# Patient Record
Sex: MALE | Race: WHITE | URBAN - METROPOLITAN AREA
[De-identification: names, ages, dates, MRNs, and addresses within clinical notes are randomized per-mention and may not be internally consistent; named-entity substitution may affect disease eponyms.]

---

## 2017-01-30 ENCOUNTER — OFFICE VISIT (OUTPATIENT)
Dept: INTERNAL MEDICINE | Facility: CLINIC | Age: 27
End: 2017-01-30

## 2017-01-30 VITALS
OXYGEN SATURATION: 96 % | DIASTOLIC BLOOD PRESSURE: 76 MMHG | WEIGHT: 170.8 LBS | HEIGHT: 74 IN | HEART RATE: 71 BPM | TEMPERATURE: 97.9 F | SYSTOLIC BLOOD PRESSURE: 116 MMHG | BODY MASS INDEX: 21.92 KG/M2

## 2017-01-30 DIAGNOSIS — Z23 NEED FOR PROPHYLACTIC VACCINATION WITH TETANUS-DIPHTHERIA (TD): ICD-10-CM

## 2017-01-30 DIAGNOSIS — Z00.00 ROUTINE GENERAL MEDICAL EXAMINATION AT A HEALTH CARE FACILITY: Primary | ICD-10-CM

## 2017-01-30 DIAGNOSIS — M41.34 THORACOGENIC SCOLIOSIS OF THORACIC REGION: ICD-10-CM

## 2017-01-30 ASSESSMENT — PAIN SCALES - GENERAL: PAINLEVEL: EXTREME PAIN (8)

## 2017-01-30 NOTE — NURSING NOTE
Chief Complaint   Patient presents with     Establish Care     Patient here to establish care, referrals.     Cat Marquez LPN at 3:25 PM on 1/30/2017.

## 2017-01-30 NOTE — PATIENT INSTRUCTIONS
Primary Care Center Medication Refill Request Information:  * Please contact your pharmacy regarding ANY request for medication refills.  ** [facility] Prescription Fax = [phone]  * Please allow 3 business days for routine medication refills.  * Please allow 5 business days for controlled substance medication refills.     Primary Care Center Test Result notification information:  *You will be notified with in 7-10 days of your appointment day regarding the results of your test.  If you are on MyChart you will be notified as soon as the provider has reviewed the results and signed off on them.

## 2017-01-30 NOTE — PROGRESS NOTES
"S: Jonathon is here for a physical. He has a history of scoliosis and is wanting to have a referral to see a surgeon. He has pain 15 min after he wakes up until he goes to bed. He also has been having times of numbness in fingertips, legs, toes. He also sometimes feels he will have protrusion of his L rib cage. IT is also affecting his every day work sitting at a desk. Has shortness of breath. He has a friend working in Dr. Barrientos's lab for neurosurgery and recommended he see her.     Past Medical History   Diagnosis Date     Scoliosis      Past Surgical History   Procedure Laterality Date     Clavicle surgery Right Age 12     Family History   Problem Relation Age of Onset     MENTAL ILLNESS Maternal Grandmother      CANCER No family hx of      DIABETES No family hx of      HEART DISEASE No family hx of        Soc Hx:   Works in sales  Smokes 1 pack every 3-4 days  6-7 beers/week  Marijuana helps with his chronic pain     ROS: 10 point ROS neg other than the symptoms noted above in the HPI.    PE: /76 mmHg  Pulse 71  Temp(Src) 97.9  F (36.6  C) (Oral)  Ht 1.88 m (6' 2.02\")  Wt 77.474 kg (170 lb 12.8 oz)  BMI 21.92 kg/m2  SpO2 96%  General:Pleasant male, in NAD  ENT: TMs normal, oropharynx clear  Neck: no LAD, no thyromegaly  Resp: lungs CAb  CV: Heart RRR, no MRG  Abd: soft, NT, ND, nl bowel sounds  MSK: scoliatic curvature of the upper thoracic spine, convex to left, L scapula protrudes further out and is higher than R scapula  Skin: warm, dry, no rash  Neuro: AOX3, no focal deficits    A/P:   Jonathon was seen today for establish care.    Diagnoses and all orders for this visit:    Routine general medical examination at a health care facility  Need for prophylactic vaccination with tetanus-diphtheria (TD)  -     TDAP ( BOOSTRIX AGES 10-64)     Health Maintenance   Topic Date Due     TETANUS IMMUNIZATION (SYSTEM ASSIGNED)  12/08/2007     INFLUENZA VACCINE (SYSTEM ASSIGNED)  09/01/2017   STD screening offered, " recently completed and normal.     Thoracogenic scoliosis of thoracic region  Comments:  Wt Dr. Barrientos  Orders:  -     XR Thoracic Spine 2 Views; Future  -     NEUROSURGERY REFERRAL  -     General PFT Lab (Please always keep checked); Future    Kyara Vallejo MD  01/30/2017

## 2017-01-30 NOTE — MR AVS SNAPSHOT
After Visit Summary   1/30/2017    Jonathon Conde    MRN: [id number]           Patient Information     Date Of Birth          1990        Visit Information        Provider Department      1/30/2017 3:30 PM Kyara Quiñones MD [facility]        Today's Diagnoses     Routine general medical examination at a health care facility    -  1     Need for prophylactic vaccination with tetanus-diphtheria (TD)         Thoracogenic scoliosis of thoracic region           Care Instructions    Primary Care Center Medication Refill Request Information:  * Please contact your pharmacy regarding ANY request for medication refills.  ** [facility] Prescription Fax = [phone]  * Please allow 3 business days for routine medication refills.  * Please allow 5 business days for controlled substance medication refills.     Primary Care Center Test Result notification information:  *You will be notified with in 7-10 days of your appointment day regarding the results of your test.  If you are on MyChart you will be notified as soon as the provider has reviewed the results and signed off on them.          Follow-ups after your visit        Additional Services     NEUROSURGERY REFERRAL       Your provider has referred you to: [facility]: Neurosurgery Clinic [facility] [phone]   [email or web address]    Please be aware that coverage of these services is subject to the terms and limitations of your health insurance plan.  Call member services at your health plan with any benefit or coverage questions.      Please bring the following with you to your appointment:    (1) Any X-Rays, CTs or MRIs which have been performed.  Contact the facility where they were done to arrange for  prior to your scheduled appointment.   (2) List of current medications  (3) This referral request   (4) Any documents/labs given to you for this referral                  Future tests  "that were ordered for you today     Open Future Orders        Priority Expected Expires Ordered    XR Thoracic Spine 2 Views Routine 2017    General PFT Lab (Please always keep checked) Routine  2018            Who to contact     Please call your clinic at [phone] to:    Ask questions about your health    Make or cancel appointments    Discuss your medicines    Learn about your test results    Speak to your doctor   If you have compliments or concerns about an experience at your clinic, or if you wish to file a complaint, please contact [facility] Patient Relations at [phone] or email us at [email or web address]         Additional Information About Your Visit        [employer] Information     [employer] is an electronic gateway that provides easy, online access to your medical records. With [employer], you can request a clinic appointment, read your test results, renew a prescription or communicate with your care team.     To sign up for [employer] visit the website at [email or web address]   You will be asked to enter the access code listed below, as well as some personal information. Please follow the directions to create your username and password.     Your access code is: [email or web address]  Expires: 2017  6:30 AM     Your access code will  in 90 days. If you need help or a new code, please contact your [facility] or call [phone] for assistance.        Care EveryWhere ID     This is your Care EveryWhere ID. This could be used by other organizations to access your [facility] medical records  [id number]        Your Vitals Were     Pulse Temperature Height BMI (Body Mass Index) Pulse Oximetry       71 97.9  F (36.6  C) (Oral) 1.88 m (6' 2.02\") 21.92 kg/m2 96%        Blood Pressure from Last 3 Encounters:   17 116/76    Weight from Last 3 Encounters:   17 77.474 kg (170 lb 12.8 oz)    "           We Performed the Following     NEUROSURGERY REFERRAL     TDAP ( BOOSTRIX AGES 10-64)        Primary Care Provider Office Phone # Fax #    Kyara Vallejo [phone]       [address]  [facility] [address]        Thank you!     Thank you for choosing [facility]  for your care. Our goal is always to provide you with excellent care. Hearing back from our patients is one way we can continue to improve our services. Please take a few minutes to complete the written survey that you may receive in the mail after your visit with us. Thank you!             Your Updated Medication List - Protect others around you: Learn how to safely use, store and throw away your medicines at www.disposemymeds.org.      Notice  As of 1/30/2017  4:35 PM    You have not been prescribed any medications.

## 2017-02-14 ENCOUNTER — OFFICE VISIT (OUTPATIENT)
Dept: INTERNAL MEDICINE | Facility: CLINIC | Age: 27
End: 2017-02-14

## 2017-02-14 VITALS
DIASTOLIC BLOOD PRESSURE: 62 MMHG | SYSTOLIC BLOOD PRESSURE: 135 MMHG | WEIGHT: 168.8 LBS | HEART RATE: 75 BPM | BODY MASS INDEX: 21.66 KG/M2

## 2017-02-14 DIAGNOSIS — M41.34 THORACOGENIC SCOLIOSIS OF THORACIC REGION: Primary | ICD-10-CM

## 2017-02-14 ASSESSMENT — PAIN SCALES - GENERAL: PAINLEVEL: MODERATE PAIN (4)

## 2017-02-14 NOTE — MR AVS SNAPSHOT
After Visit Summary   2/14/2017    Jonathon Conde    MRN: [id number]           Patient Information     Date Of Birth          1990        Visit Information        Provider Department      2/14/2017 6:00 PM Kyara Quiñones MD [facility]        Today's Diagnoses     Thoracogenic scoliosis of thoracic region    -  1       Follow-ups after your visit        Additional Services     NEUROSURGERY REFERRAL       Your provider has referred you to: Dr. Barrientos    Please be aware that coverage of these services is subject to the terms and limitations of your health insurance plan.  Call member services at your health plan with any benefit or coverage questions.      Please bring the following with you to your appointment:    (1) Any X-Rays, CTs or MRIs which have been performed.  Contact the facility where they were done to arrange for  prior to your scheduled appointment.   (2) List of current medications  (3) This referral request   (4) Any documents/labs given to you for this referral                  Your next 10 appointments already scheduled     Feb 15, 2017  7:45 PM CST   (Arrive by 7:30 PM)   MR THORACIC SPINE W/O CONTRAST with [address] [facility] ([facility])    [address] [place] [id number] [phone]           Take your medicines as usual, unless your doctor tells you not to. Bring a list of your current medicines to your exam (including vitamins, minerals and over-the-counter drugs). Also bring the results of similar scans you may have had.  Please remove any body piercings and hair extensions before you arrive.  Follow your doctor s orders. If you do not, we may have to postpone your exam.  You will not have contrast for this exam. You do not need to do anything special to prepare.  The MRI machine uses a strong magnet. Please wear clothes without metal (snaps, zippers). A sweatsuit works  well, or we may give you a [facility] gown.   **IMPORTANT** THE INSTRUCTIONS BELOW ARE ONLY FOR THOSE PATIENTS WHO HAVE BEEN TOLD THEY WILL RECEIVE SEDATION OR GENERAL ANESTHESIA DURING THEIR MRI PROCEDURE:  IF YOU WILL RECEIVE SEDATION (take medicine to help you relax during your exam):   You must get the medicine from your doctor before you arrive. Bring the medicine to the exam. Do not take it at home.   Arrive one hour early. Bring someone who can take you home after the test. Your medicine will make you sleepy. After the exam, you may not drive, take a bus or take a taxi by yourself.   No eating 8 hours before your exam. You may have clear liquids up until 4 hours before your exam. (Clear liquids include water, clear tea, black coffee and fruit juice without pulp.)  IF YOU WILL RECEIVE ANESTHESIA (be asleep for your exam):   Arrive 1 1/2 hours early. Bring someone who can take you home after the test. You may not drive, take a bus or take a taxi by yourself.   No eating 8 hours before your exam. You may have clear liquids up until 4 hours before your exam. (Clear liquids include water, clear tea, black coffee and fruit juice without pulp.)   You will spend four to five hours in the recovery room.  Please call the Imaging Department at your exam site with any questions.              Future tests that were ordered for you today     Open Future Orders        Priority Expected Expires Ordered    MRI Thoracic Spine w/o contrast Routine  2/14/2018 2/14/2017            Who to contact     Please call your clinic at [phone] to:    Ask questions about your health    Make or cancel appointments    Discuss your medicines    Learn about your test results    Speak to your doctor   If you have compliments or concerns about an experience at your clinic, or if you wish to file a complaint, please contact [facility] Patient Relations at [phone] or email us at [email or web address]          Additional Information About Your Visit        [employer] Information     [employer] is an electronic gateway that provides easy, online access to your medical records. With [employer], you can request a clinic appointment, read your test results, renew a prescription or communicate with your care team.     To sign up for [employer] visit the website at [email or web address]   You will be asked to enter the access code listed below, as well as some personal information. Please follow the directions to create your username and password.     Your access code is: [email or web address]  Expires: 2017  6:30 AM     Your access code will  in 90 days. If you need help or a new code, please contact your [facility] or call [phone] for assistance.        Care EveryWhere ID     This is your Care EveryWhere ID. This could be used by other organizations to access your [facility] medical records  [id number]        Your Vitals Were     Pulse BMI (Body Mass Index)                75 21.66 kg/m2           Blood Pressure from Last 3 Encounters:   17 135/62   17 116/76    Weight from Last 3 Encounters:   17 76.6 kg (168 lb 12.8 oz)   17 77.5 kg (170 lb 12.8 oz)              We Performed the Following     NEUROSURGERY REFERRAL        Primary Care Provider Office Phone # Fax #    Kyara Denise Vallejo [phone]       [address] [address]        Thank you!     Thank you for choosing [facility]IMARY [facility]ARE CLINIC  for your care. Our goal is always to provide you with excellent care. Hearing back from our patients is one way we can continue to improve our services. Please take a few minutes to complete the written survey that you may receive in the mail after your visit with us. Thank you!             Your Updated Medication List - Protect others around you: Learn how to safely use, store and throw away your  medicines at www.disposemymeds.org.      Notice  As of 2/14/2017  6:18 PM    You have not been prescribed any medications.

## 2017-02-14 NOTE — NURSING NOTE
Chief Complaint   Patient presents with     Referral     Patient here for referrals and MRI order.      Mass     Patient here for swollen glands in right arm pit x1 week.      Guillermo Douglas CMA at 5:53 PM on 2/14/2017.

## 2017-02-15 NOTE — PROGRESS NOTES
S: Jonathon is here for f/u on scoliosis. He needs an MRI prior to a neurosurgery appointment. Also, he specifically wants the referral to state Dr. Barrientos's name as this is who he'd like to see. Finally, he has a lump underneath his R armpit for the past week that has been tender.It is improving however and not quite as large.     O:   /62 (BP Location: Right arm, Patient Position: Chair, Cuff Size: Adult Regular)  Pulse 75  Wt 76.6 kg (168 lb 12.8 oz)  BMI 21.66 kg/m2  General: Pleasant male, in NAD  Lymph: R axilla with a 1cm mobile lymph node, non-tender.     A/P:   Jonathon was seen today for referral and mass.    Diagnoses and all orders for this visit:    Thoracogenic scoliosis of thoracic region  -     NEUROSURGERY REFERRAL  -     MRI Thoracic Spine w/o contrast; Future      Axillary lymph node. Continue to monitor, given that already improving, likely local reaction to infection. If not improving in the next 3-4 weeks, RTC and can get US at that time.     Kyara Vallejo MD  02/14/17

## 2017-03-03 ENCOUNTER — OFFICE VISIT (OUTPATIENT)
Dept: NEUROSURGERY | Facility: CLINIC | Age: 27
End: 2017-03-03

## 2017-03-03 VITALS
SYSTOLIC BLOOD PRESSURE: 123 MMHG | TEMPERATURE: 97.6 F | OXYGEN SATURATION: 98 % | HEIGHT: 75 IN | HEART RATE: 81 BPM | BODY MASS INDEX: 20.39 KG/M2 | DIASTOLIC BLOOD PRESSURE: 83 MMHG | RESPIRATION RATE: 20 BRPM | WEIGHT: 164 LBS

## 2017-03-03 DIAGNOSIS — M41.34 THORACOGENIC SCOLIOSIS OF THORACIC REGION: Primary | ICD-10-CM

## 2017-03-03 ASSESSMENT — ENCOUNTER SYMPTOMS
STIFFNESS: 0
NECK PAIN: 1
JOINT SWELLING: 0
MUSCLE CRAMPS: 0
ARTHRALGIAS: 0
MYALGIAS: 1
BACK PAIN: 1
MUSCLE WEAKNESS: 0

## 2017-03-03 ASSESSMENT — PAIN SCALES - GENERAL: PAINLEVEL: SEVERE PAIN (6)

## 2017-03-03 NOTE — LETTER
3/3/2017       RE: Jonathon Conde  [address]   [place] [address]     Dear Colleague,    Thank you for referring your patient, Jonathon Conde, to the [facility] NEUROSURGERY at [facility]. Please see a copy of my visit note below.    DRAFT   dictated    Neurosurgery Clinic Consult  Date of Visit: 3/3/2017    Referring Provider:  Referred for:     Dear      We were happy to see Jonathon Conde , a pleasant 26 year old year old male for    HPI: His/Her history begins .       Symptom description:  Reviewed in Epic;  Patient Supplied Answers To the [facility] Pain Questionnaire  [facility] Pain -  Patient Entered Questionnaire/Answers 3/3/2017   What number best describes your pain right now:  0 = No pain  to  10 = Worst pain imaginable 7         Current Outpatient Prescriptions:      Probiotic Product (PROBIOTIC & ACIDOPHILUS EX ST PO), Take 1 teaspoonful by mouth daily, Disp: , Rfl:     Allergies   Allergen Reactions     Sulfa Drugs      PN: LW Reaction: rash       Past Medical History   Diagnosis Date     Scoliosis        Past Surgical History   Procedure Laterality Date     Clavicle surgery Right Age 12       Family History   Problem Relation Age of Onset     MENTAL ILLNESS Maternal Grandmother      CANCER No family hx of      DIABETES No family hx of      HEART DISEASE No family hx of        Social History     Social History     Marital status: Single     Spouse name: N/A     Number of children: N/A     Years of education: N/A     Occupational History     Not on file.     Social History Main Topics     Smoking status: Current Every Day Smoker     Packs/day: 0.25     Types: Cigarettes     Smokeless tobacco: Current User     Alcohol use 0.6 - 1.2 oz/week     1 - 2 Cans of beer per week      Comment: DAILY     Drug use: Yes     Special: Marijuana     Sexual activity: Yes     Partners: Female     Other Topics Concern     Not on file     Social History Narrative     Problem list and  "13 point review of systems: is reviewed in Epic and is negative with the exception of those symptoms associated with HPI and PMH.      OBJECTIVE:   /83 (BP Location: Right arm, Patient Position: Chair, Cuff Size: Adult Large)  Pulse 81  Temp 97.6  F (36.4  C) (Oral)  Resp 20  Ht 1.905 m (6' 3\")  Wt 74.4 kg (164 lb)  SpO2 98%  BMI 20.5 kg/m2    Imaging:  These are the pertinent radiologist's findings from:  MRI CT Xray's W/WO @ [facility] .   See the full report in EPIC/Media tab.  I personally reviewed the images with the patient.      Exam:  *   Well developed, well nourished male found seated comfortably in exam room chair.  SHe is able to sit and rise independently.  SHe is accompanied by female .    *  Mental Status  A&O X3.  Bright, alert, affable, interactive. Language fluid, fund of knowledge intact. Good historian.  Mood and affect congruent and WNL.   [place]atte flat, not arched. Did not evaluate joint hypermobility  *  Cranial Nerves  II: Able to read printed forms, VF full to gross confrontation.  III: IV, VI:  PERRLA, EOMI, No nystagmus, no ptosis.  V: Sensation intact in bilateral V1, V2, and V3. Jaw clench symmetric.    VII:  Intact to voice bilaterally.  IX:  Pushes tongue against bilateral cheeks.  X:  Palate elevates, uvula midline, phonation intact.  XI: Elevates shoulders, head turn intact.  XII: Tongue midline. No fasciculations.  *  Cerebellar:  Finger nose accurate. Heel shin intact.   Rapid alternating movements smooth.  *  Kal-cranial:    No drift.  *  Cervical Spine:  DTR's at Biceps, Triceps, and Brachioradialis 2/4 and symmetric.  Sensation intact.    No Mcgowan's. No Phalen's.  No Tinel's. No fasciculations.   Muscle bulk and tone WNL.  Upper Extremity Strength.              RIGHT                LEFT     Deltoid              5/5                   5/5       Biceps              5/5                   5/5        Triceps              5/5                   5/5       Wrist Extensor        "       5/5                   5/5                     5/5                    5/5       Interossei              5/5                   5/5       EPL              5/5                    5/5       Pinch              5/5                  5/5            *  Lumbar Spine:    Able to heel and toe stand/walk.   DTR's Patellar and Achilles 1/4 and symmetric.   No fasciculations.  Muscle bulk and tone WNL.  No Clonus.  Sensation intact.    Lower Extremity Strength                   RIGHT                   LEFT     Iliopsoas                    5/5                      5/5       Quad                    5/5                        5/5       Hamstring                      5/5                        5/5         Gastrocs                    5/5                        5/5       Tib. Anterior                     5/5                        5/5       EHL                      5/5                        5/5       Babinski                 Down                                      Down                     *  Structural Exam  Inspection of the spine reveals + high left T rotatory curve, right compensatory curve scoliosis, exaggerated kyphosis or lordosis.Head is balanced over the pelvis in the coronal and sagittal plane.    Cervical spine ROM full. No spasming. No tenderness to palpation.  No Spurling's or L'Hermitte's.   Lumbar ROM full.  . No spasming, no tenderness, no step offs. No SLR.  No SI tenderness. No trochanteric bursal tenderness. Negative Jean's. Negative 's.  Feet are warm, intact dorsalis pedis pulses, flat-arches  Gait narrow, smooth, no scissoring. No antalgia. T      ASSESSMENT/PLAN:  1. Thoracogenic scoliosis      Best Regards,  Jaylin Thomson PA-C  [facility]  Department of Neurosurgery  Phone: [phone]  Fax: [phone]    Total time: 60 minutes with more than 30 minutes spent in direct face to face contact reviewing films, providing education, counseling, the importance of good health habits  including cessation of nicotine, non-operative therapies,and indications for surgery as well as further follow up.

## 2017-03-03 NOTE — MR AVS SNAPSHOT
After Visit Summary   3/3/2017    Jonathon Conde    MRN: [id number]           Patient Information     Date Of Birth          1990        Visit Information        Provider Department      3/3/2017 1:30 PM Jaylin Thomson PA-C [facility] Neurosurgery        Today's Diagnoses     Thoracogenic scoliosis    -  1       Follow-ups after your visit        Your next 10 appointments already scheduled     Mar 08, 2017  4:30 PM CST   (Arrive by 4:15 PM)   XR LUMBAR SPINE 2/3 VIEWS with [id number]   [facility] ([facility])    [address] [id number] [phone]           Please bring a list of your current medicines to your exam. (Include vitamins, minerals and over-thecounter medicines.) Leave your valuables at home.  Tell your doctor if there is a chance you may be pregnant.  You do not need to do anything special for this exam.            Mar 08, 2017  4:50 PM CST   XR CERVICAL SPINE G/E 4 VIEWS with [email or web address]   [facility] ([facility])    [address] [id number] [phone]           Please bring a list of your current medicines to your exam. (Include vitamins, minerals and over-thecounter medicines.) Leave your valuables at home.  Tell your doctor if there is a chance you may be pregnant.  You do not need to do anything special for this exam.            Mar 08, 2017  5:10 PM CST   (Arrive by 4:55 PM)   XR THORACIC/LUMBAR STANDING 1 VIEW (SCOLI) with [email or web address]   [facility] ([facility])    [address] [id number] [phone]           Please bring a list of your current medicines to your exam. (Include vitamins, minerals and over-thecounter medicines.) Leave your valuables at home.  Tell your doctor if there is a chance you may be pregnant.  You do not need to do anything special for  this exam.            Mar 08, 2017  5:30 PM CST   (Arrive by 5:15 PM)   MR CERVICAL SPINE W/O CONTRAST with [id number]   [facility] ([facility] [place])    [address] [facility]  [address] [facility] [address] [phone]           Take your medicines as usual, unless your doctor tells you not to. Bring a list of your current medicines to your exam (including vitamins, minerals and over-the-counter drugs). Also bring the results of similar scans you may have had.  Please remove any body piercings and hair extensions before you arrive.  Follow your doctor s orders. If you do not, we may have to postpone your exam.  You will not have contrast for this exam. You do not need to do anything special to prepare.  The MRI machine uses a strong magnet. Please wear clothes without metal (snaps, zippers). A sweatsuit works well, or we may give you a hospital gown.   **IMPORTANT** THE INSTRUCTIONS BELOW ARE ONLY FOR THOSE PATIENTS WHO HAVE BEEN TOLD THEY WILL RECEIVE SEDATION OR GENERAL ANESTHESIA DURING THEIR MRI PROCEDURE:  IF YOU WILL RECEIVE SEDATION (take medicine to help you relax during your exam):   You must get the medicine from your doctor before you arrive. Bring the medicine to the exam. Do not take it at home.   Arrive one hour early. Bring someone who can take you home after the test. Your medicine will make you sleepy. After the exam, you may not drive, take a bus or take a taxi by yourself.   No eating 8 hours before your exam. You may have clear liquids up until 4 hours before your exam. (Clear liquids include water, clear tea, black coffee and fruit juice without pulp.)  IF YOU WILL RECEIVE ANESTHESIA (be asleep for your exam):   Arrive 1 1/2 hours early. Bring someone who can take you home after the test. You may not drive, take a bus or take a taxi by yourself.   No eating 8 hours before your exam. You may have clear liquids up until 4 hours before your exam. (Clear  liquids include water, clear tea, black coffee and fruit juice without pulp.)   You will spend four to five hours in the recovery room.  Please call the Imaging Department at your exam site with any questions.            Mar 08, 2017  6:15 PM CST   (Arrive by 6:00 PM)   MR LUMBAR SPINE W/O CONTRAST with [id number]   [facility] ([facility] [place])    [address] [address] [phone]           Take your medicines as usual, unless your doctor tells you not to. Bring a list of your current medicines to your exam (including vitamins, minerals and over-the-counter drugs). Also bring the results of similar scans you may have had.  Please remove any body piercings and hair extensions before you arrive.  Follow your doctor s orders. If you do not, we may have to postpone your exam.  You will not have contrast for this exam. You do not need to do anything special to prepare.  The MRI machine uses a strong magnet. Please wear clothes without metal (snaps, zippers). A sweatsuit works well, or we may give you a hospital gown.   **IMPORTANT** THE INSTRUCTIONS BELOW ARE ONLY FOR THOSE PATIENTS WHO HAVE BEEN TOLD THEY WILL RECEIVE SEDATION OR GENERAL ANESTHESIA DURING THEIR MRI PROCEDURE:  IF YOU WILL RECEIVE SEDATION (take medicine to help you relax during your exam):   You must get the medicine from your doctor before you arrive. Bring the medicine to the exam. Do not take it at home.   Arrive one hour early. Bring someone who can take you home after the test. Your medicine will make you sleepy. After the exam, you may not drive, take a bus or take a taxi by yourself.   No eating 8 hours before your exam. You may have clear liquids up until 4 hours before your exam. (Clear liquids include water, clear tea, black coffee and fruit juice without pulp.)  IF YOU WILL RECEIVE ANESTHESIA (be asleep for your exam):   Arrive 1 1/2 hours early. Bring someone who can  take you home after the test. You may not drive, take a bus or take a taxi by yourself.   No eating 8 hours before your exam. You may have clear liquids up until 4 hours before your exam. (Clear liquids include water, clear tea, black coffee and fruit juice without pulp.)   You will spend four to five hours in the recovery room.  Please call the Imaging Department at your exam site with any questions.            Mar 16, 2017  4:00 PM CDT   (Arrive by 3:45 PM)   Return Visit with Jaylin Thomson PA-C   [facility] Neurosurgery ([facility] [place])    [address] [facility]  [address] [facility] [address] [phone]              Future tests that were ordered for you today     Open Future Orders        Priority Expected Expires Ordered    MRI Cervical spine w/o contrast Routine  3/3/2018 3/3/2017    MRI Lumbar spine w/o contrast Routine  3/3/2018 3/3/2017    XR Lumbar Spine 2-3 Views* Routine 3/3/2017 3/3/2018 3/3/2017    X-ray Cervical spine 4 vws Routine 3/3/2017 3/3/2018 3/3/2017    XR Thor/Lumb Standing 1 View (Scoli) Routine 3/3/2017 3/3/2018 3/3/2017            Who to contact     Please call your clinic at [phone] to:    Ask questions about your health    Make or cancel appointments    Discuss your medicines    Learn about your test results    Speak to your doctor   If you have compliments or concerns about an experience at your clinic, or if you wish to file a complaint, please contact [facility] Patient Relations at [phone] or email us at [email or web address]         Additional Information About Your Visit        MyChart Information     [employer] gives you secure access to your electronic health record. If you see a primary care provider, you can also send messages to your care team and make appointments. If you have questions, please call your primary care clinic.  If you do not have a primary care provider, please call [phone] and they  "will assist you.      K[employer] is an electronic gateway that provides easy, online access to your medical records. With [employer], you can request a clinic appointment, read your test results, renew a prescription or communicate with your care team.     To access your existing account, please contact your [facility] or call [phone] for assistance.        Care EveryWhere ID     This is your Care EveryWhere ID. This could be used by other organizations to access your [facility] medical records  [id number]        Your Vitals Were     Pulse Temperature Respirations Height Pulse Oximetry BMI (Body Mass Index)    81 97.6  F (36.4  C) (Oral) 20 1.905 m (6' 3\") 98% 20.5 kg/m2       Blood Pressure from Last 3 Encounters:   03/03/17 123/83   02/14/17 135/62   01/30/17 116/76    Weight from Last 3 Encounters:   03/03/17 74.4 kg (164 lb)   02/14/17 76.6 kg (168 lb 12.8 oz)   01/30/17 77.5 kg (170 lb 12.8 oz)               Primary Care Provider Office Phone # Fax #    Kyara Denise Vallejo [phone]       [address] [place] [address]        Thank you!     Thank you for choosing [facility]  for your care. Our goal is always to provide you with excellent care. Hearing back from our patients is one way we can continue to improve our services. Please take a few minutes to complete the written survey that you may receive in the mail after your visit with us. Thank you!             Your Updated Medication List - Protect others around you: Learn how to safely use, store and throw away your medicines at www.disposemymeds.org.          This list is accurate as of: 3/3/17  3:03 PM.  Always use your most recent med list.                   Brand Name Dispense Instructions for use    PROBIOTIC & ACIDOPHILUS EX ST PO      Take 1 teaspoonful by mouth daily         "

## 2017-03-03 NOTE — PROGRESS NOTES
Neurosurgery Clinic Consult  Date of Visit: 3/3/2017    Referring Provider  Kyara Vallejo MD   [address]   [place] [address]     CHIEF COMPLAINT:  Thoracic spine pain.      Dear Dr. Manish Vallejo:        We were happy to see Jonathon Conde in our Neurosurgical Clinic today for thoracic scoliosis and spinal pain.      As you may recall, this very pleasant 26-year-old male was first found to have a thoracic deformity, to the best of his recollection, about 5 years ago when he went to an urgent care at [facility] for some tingling between the shoulder blades and pain in the anterior left ribs.      There a plain x-ray was done revealing a high thoracic deformity, but because he was uninsured at the time, had no free cash to be able to get treatment, he never saw the specialist to which he was referred and has had no further evaluation of any kind.      He relates, by way of history, that at the age of 12, he had a hockey accident in which he was hit from the right, landed hard on his right shoulder and broke the right collarbone and had a right AC joint separation for which he underwent open reduction and pinning of that bone.  He wore a sling for a number of weeks.  He was never told at that time that he had any spinal problems, and the only reason he brings it up because he speculates that this particular accident caused his high thoracic deformity. He also reports that in scoliosis screenings in his school he was never told he had a curve.     Over the past 5 years his discomforts have become a bit more intense, and while he would like to have gotten treatment, he has not had insurance until relatively recently.  Since he now has insurance, he would like to undergo a full evaluation and treatment. To that end, he saw you who ordered some plain films and MRI of the thoracic spine.      Symptoms he describes as discomfort between the shoulder blades, sometimes  painful numbness between the shoulder blades, a painful numbness in the anterior left rib cage, just below the breast tissue, does not radiate distally.  He has no arm pain, but he does note that he has tingling in the fingertips, as well as both knees when he spends more than a few seconds looking down or if he stretches his hands out in front of him to reach for something or pick something up.  This has generally present if both arms or reaching..  There is no shock-like sensation.  There are no changes in unilateral sensation if he turns his head from one side to the other or looks up or down, only bilateral symptoms if he looks down or reaches forward.  No loss of bowel or bladder control.  No clumsiness in the hands or feet.  No weakness. See remainder of UC pain answers as below:   Reviewed with patient;  Patient Supplied Answers To the UC Pain Questionnaire  UC Pain -  Patient Entered Questionnaire/Answers 3/3/2017   What number best describes your pain right now:  0 = No pain  to  10 = Worst pain imaginable 7   He presents for evaluation and treatment recommendations.    Current Outpatient Prescriptions:      Probiotic Product (PROBIOTIC & ACIDOPHILUS EX ST PO), Take 1 teaspoonful by mouth daily, Disp: , Rfl:     Allergies   Allergen Reactions     Sulfa Drugs      PN: LW Reaction: rash       Past Medical History   Diagnosis Date     Scoliosis        Past Surgical History   Procedure Laterality Date     Clavicle surgery Right Age 12       Family History   Problem Relation Age of Onset     MENTAL ILLNESS Maternal Grandmother      CANCER No family hx of      DIABETES No family hx of      HEART DISEASE No family hx of        Social History     Social History     Marital status: Single     Spouse name: N/A     Number of children: N/A     Years of education: N/A     Occupational History     Not on file.     Social History Main Topics     Smoking status: Current Every Day Smoker     Packs/day: 0.25     Types:  "Cigarettes     Smokeless tobacco: Current User     Alcohol use 0.6 - 1.2 oz/week     1 - 2 Cans of beer per week      Comment: DAILY     Drug use: Yes     Special: Marijuana     Sexual activity: Yes     Partners: Female     Other Topics Concern     Not on file     Social History Narrative     Problem list and 13 point review of systems: is reviewed in Epic and is negative with the exception of those symptoms associated with HPI and PMH.      OBJECTIVE:   /83 (BP Location: Right arm, Patient Position: Chair, Cuff Size: Adult Large)  Pulse 81  Temp 97.6  F (36.4  C) (Oral)  Resp 20  Ht 1.905 m (6' 3\")  Wt 74.4 kg (164 lb)  SpO2 98%  BMI 20.5 kg/m2    Imaging:  These are the pertinent  findings from:   Plain films of the thoracic spine performed on 01/30/2017 reveal a high left upper thoracic curve, apex about T4.  Avila angle measurements presented is about 55 degrees with what appears to be a compensatory curve right mid thoracic spine, apex about T9.  Vertebral bodies appeared to be appropriately segmented.  Disks are present at every level.      MRI of the thoracic spine performed 02/15/2017 is remarkable for the previously noted scoliotic curvature, no significant canal or foraminal stenosis.  The cord does abut the left lateral aspect of the bony canal in the mid to lower thoracic region and the right lateral canal in the upper thoracic region.  No cord signal change, syrinx or other structural abnormality noted within the cord itself.       See the full report in EPIC/Media tab.  I personally reviewed the images with the patient.      Exam:  Pertinent positives include multiple cafe-au-lait spots, 1 large on the posterior right torso, smaller on the limbs, a small hairy patch but without dimpling in the low mid lumbar spine.  Patient has no clubbing of the feet.  He is tall and slender but palate is normal arch.  Measured height today is 6 feet 2-1/2 inches tall in stocking feet.  Wing span is not performed " today as we do not have that measuring device set up in the clinic.   He has a completely normal neurologic exam.  He has a high left thoracic rotatory curvature with rib hump and a mid right thoracic mildly rotated curvature.  Normal cervical and lumbar range of motions    *   Well developed, well nourished male found seated comfortably in exam room chair.  He is able to sit and rise independently.  He is accompanied by female .    *  Mental Status  A&O X3.  Bright, alert, affable, interactive. Language fluid, fund of knowledge intact. Good historian.  Mood and affect congruent and WNL.   Palate flat, not arched. Did not evaluate joint hypermobility  *  Cranial Nerves  II: Able to read printed forms, VF full to gross confrontation.  III: IV, VI:  PERRLA, EOMI, No nystagmus, no ptosis.  V: Sensation intact in bilateral V1, V2, and V3. Jaw clench symmetric.    VII:  Intact to voice bilaterally.  IX:  Pushes tongue against bilateral cheeks.  X:  Palate elevates, uvula midline, phonation intact.  XI: Elevates shoulders, head turn intact.  XII: Tongue midline. No fasciculations.  *  Cerebellar:  Finger nose accurate.  *  Kal-cranial:    No drift.  *  Cervical Spine:  DTR's at Biceps, Triceps, and Brachioradialis 2/4 and symmetric.  Sensation intact.    No Mcgowan's. No Phalen's.  No Tinel's. No fasciculations.   Muscle bulk and tone WNL.  Upper Extremity Strength.              RIGHT                LEFT     Deltoid              5/5                   5/5       Biceps              5/5                   5/5        Triceps              5/5                   5/5       Wrist Extensor              5/5                   5/5                     5/5                    5/5       Interossei              5/5                   5/5       EPL              5/5                    5/5       Pinch              5/5                  5/5            *  Lumbar Spine:    Able to heel and toe stand.   DTR's Patellar and Achilles 1/4  and symmetric.   No fasciculations.  Muscle bulk and tone WNL.  No Clonus.  Sensation intact.    Lower Extremity Strength                   RIGHT                   LEFT     Iliopsoas                    5/5                      5/5       Quad                    5/5                        5/5       Hamstring                      5/5                        5/5         Gastrocs                    5/5                        5/5       Tib. Anterior                     5/5                        5/5       EHL                      5/5                        5/5       Babinski                 Down                                      Down                     *  Structural Exam  Inspection of the spine reveals a high left T rotatory curve, right mid T spine curve scoliosis.    No exaggerated kyphosis or lordosis.  Head is balanced over the pelvis in the coronal and sagittal plane.    Cervical spine ROM full. No spasming. No tenderness to palpation.  No Spurling's or L'Hermitte's.   Lumbar ROM full.  . No spasming, no tenderness, no step offs. No SLR.  No SI tenderness. No trochanteric bursal tenderness. Negative Jean's. Negative 's.    Feet are warm, intact dorsalis pedis pulses, flat-arches.  Gait narrow, smooth, no scissoring. No antalgia.       ASSESSMENT/PLAN:  1. Thoracogenic scoliosis      This nice gentleman has a high thoracic curve.  We are going to get standing views to assess overall spinal balance, AP lateral bending views to assess  which are structural and which are compensatory, MRI of the cervical spine because of the unusual nature of his tingling.  I doubt this is cervical, but since his fingers are tingling as well as his knee caps, we will have to take a look and see if the cord is being impinged in any way.  MRI of the lumbar spine to evaluate for tethering, again since he has an unusual presentation with such a high curve.  We also need to get whatever old imaging he has from [facility], and I  would like to get the office notes from, if possible, 5 years ago.  I hope they did a measured height at that time, so we can tell if there is any decrease in the height as his curvature it increases. He has cafe-au-lait spots but not other evidence of NF, a hair patch in the lumbar spine but no  evidence of tethered cord, Chiari, his feet have flat arches.  He has a Marfanoid appearance but his palate is normal.  We were unable to do wingspans. Overall an unusual mix of findings.     I have discussed all this and reviewed the images as well as the findings with Dr. Barrientos.  We are in general agreement.  We will see this nice gentleman back with all of this stuff.  We can see him in my clinic on a day that Dr. Barrientos is here.  Almost certainly, we will be needing to enlist the assistance of a spinal deformity surgeon, and so once we have had a chance to see this gentleman back we will talk about where we go from there and which the surgeons we will be consulting with.  I did speak with him and his girlfriend to let him know that his case is complex, and the workup and evaluation phase will probably be several visits before we come to a final decision about what, if anything, should be done and in what kind of timeframe.      I have supplied him with business cards and telephone numbers.  He knows he can call should he have any questions, comments or concerns between now and the time of the next visit.  It has been a great pleasure assisting in the care of this very nice patient.  We appreciate your confidence in the [facility] Department of Neurosurgery.    Best Regards,    Jaylin Thomson PA-C  [facility]  Department of Neurosurgery  Phone: [phone]  Fax: [phone]        Total time: 60 minutes with more than 30 minutes spent in direct face to face contact reviewing films, providing education, counseling, the importance of good health habits including cessation of nicotine, non-operative  therapies,and indications for surgery as well as further follow up.

## 2017-03-30 ENCOUNTER — OFFICE VISIT (OUTPATIENT)
Dept: NEUROSURGERY | Facility: CLINIC | Age: 27
End: 2017-03-30

## 2017-03-30 VITALS
WEIGHT: 165.6 LBS | HEIGHT: 75 IN | DIASTOLIC BLOOD PRESSURE: 73 MMHG | HEART RATE: 67 BPM | SYSTOLIC BLOOD PRESSURE: 112 MMHG | BODY MASS INDEX: 20.59 KG/M2

## 2017-03-30 DIAGNOSIS — M41.34 THORACOGENIC SCOLIOSIS OF THORACIC REGION: Primary | ICD-10-CM

## 2017-03-30 ASSESSMENT — PAIN SCALES - GENERAL: PAINLEVEL: SEVERE PAIN (6)

## 2017-03-30 NOTE — PROGRESS NOTES
Neurosurgery Follow up  Date of Visit: 3/30/2017      CHIEF COMPLAINT:  Thoracic spine pain.  HPI   This very pleasant 26-year-old male was first found to have a thoracic deformity, to the best of his recollection, about 5 years ago when he went to an urgent care at [facility] for some tingling between the shoulder blades and pain in the anterior left ribs. There a plain x-ray was done revealing a high thoracic deformity, but because he was uninsured at the time, had no free cash to be able to get treatment, he never saw the specialist to which he was referred and has had no further evaluation of any kind.      He relates, by way of history, that at the age of 12, he had a hockey accident in which he was hit from the right, landed hard on his right shoulder and broke the right collarbone and had a right AC joint separation for which he underwent open reduction and pinning of that bone.  He wore a sling for a number of weeks.  He was never told at that time that he had any spinal problems, and the only reason he brings it up because he speculates that this particular accident caused his high thoracic deformity. He also reports that in scoliosis screenings in his school he was never told he had a curve.     Over the past 5 years his discomforts have become a bit more intense, and while he would like to have gotten treatment, he has not had insurance until relatively recently.  Since he now has insurance, he would like to undergo a full evaluation and treatment. To that end, he saw his PCP who ordered some plain films and MRI of the thoracic spine.      Symptoms he describes as discomfort between the shoulder blades, sometimes painful numbness between the shoulder blades, a painful numbness in the anterior left rib cage, just below the breast tissue, does not radiate distally.  He has no arm pain, but he does note that he has tingling in the fingertips, as well as both knees when he spends more than a few seconds  "looking down or if he stretches his hands out in front of him to reach for something or pick something up.  This has generally present if both arms or reaching..  There is no shock-like sensation.  There are no changes in unilateral sensation if he turns his head from one side to the other or looks up or down, only bilateral symptoms if he looks down or reaches forward.  No loss of bowel or bladder control.  No clumsiness in the hands or feet.  No weakness.   He had a partial work up when he presented here. We completed the workup and findings are noted in Assessment and Plan.      Current Outpatient Prescriptions:      Probiotic Product (PROBIOTIC & ACIDOPHILUS EX ST PO), Take 1 teaspoonful by mouth daily, Disp: , Rfl:     Allergies   Allergen Reactions     Sulfa Drugs      PN: LW Reaction: rash     PMH, FAM HX, SOC HX,Problem list : is reviewed in Epic.  + smoker     OBJECTIVE:   /73 (BP Location: Left arm, Patient Position: Chair, Cuff Size: Adult Regular)  Pulse 67  Ht 1.905 m (6' 3\")  Wt 75.1 kg (165 lb 9.6 oz)  BMI 20.7 kg/m2    Imaging:  These are the pertinent  findings from:   Plain films of the thoracic spine performed on 01/30/2017 reveal a high left upper thoracic curve, apex about T4.  Avila angle measurements presented is about 55 degrees with what appears to be a compensatory curve right mid thoracic spine, apex about T9.  Vertebral bodies appeared to be appropriately segmented.  Disks are present at every level.      MRI of the thoracic spine performed 02/15/2017 is remarkable for the previously noted scoliotic curvature, no significant canal or foraminal stenosis.  The cord does abut the left lateral aspect of the bony canal in the mid to lower thoracic region and the right lateral canal in the upper thoracic region.  No cord signal change, syrinx or other structural abnormality noted within the cord itself.       See the full report in EPIC/Media tab.  I personally reviewed the images with " the patient.      Exam:  Pertinent positives include multiple cafe-au-lait spots, 1 large on the posterior right torso, smaller on the limbs, a small hairy patch but without dimpling in the low mid lumbar spine.  Patient has no clubbing of the feet.  He is tall and slender but palate is normal arch.  Measured height today is 6 feet 2-1/2 inches tall in stocking feet.  Wing span is not performed today as we do not have that measuring device set up in the clinic.  Thumbs can touch the wrists bilaterally but the wrists are not hyperflexible.  He has a completely normal neurologic exam.  He has a high left thoracic rotatory curvature with rib hump and a mid right thoracic mildly rotated curvature.  Normal cervical and lumbar range of motions    *   Well developed, well nourished male found seated comfortably in exam room chair.  He is able to sit and rise independently.  He is accompanied by female .    *  Mental Status  A&O X3.  Bright, alert, affable, interactive. Language fluid, fund of knowledge intact. Good historian.  Mood and affect congruent and WNL.   Palate flat, not arched.   *  Cranial Nerves  II: Able to read printed forms, VF full to gross confrontation.  III: IV, VI:  PERRLA, EOMI, No nystagmus, no ptosis.  V: Sensation intact in bilateral V1, V2, and V3. Jaw clench symmetric.    VII:  Intact to voice bilaterally.  IX:  Pushes tongue against bilateral cheeks.  X:  Palate elevates, uvula midline, phonation intact.  XI: Elevates shoulders, head turn intact.  XII: Tongue midline. No fasciculations.  *  Cerebellar:  Finger nose accurate.  *  Kal-cranial:    No drift.  *  Cervical Spine:  DTR's at Biceps, Triceps, and Brachioradialis 2/4 and symmetric.  Sensation intact.    No Mcgowan's. No Phalen's.  No Tinel's. No fasciculations.   Muscle bulk and tone WNL.  Upper Extremity Strength.              RIGHT                LEFT     Deltoid              5/5                   5/5       Biceps               5/5                   5/5        Triceps              5/5                   5/5       Wrist Extensor              5/5                   5/5                     5/5                    5/5       Interossei              5/5                   5/5       EPL              5/5                    5/5       Pinch              5/5                  5/5          *  Lumbar Spine:    Able to heel and toe stand.   DTR's Patellar and Achilles 1/4 and symmetric.   No fasciculations.  Muscle bulk and tone WNL.  No Clonus.  Sensation intact.    Lower Extremity Strength                   RIGHT                   LEFT     Iliopsoas                    5/5                      5/5       Quad                    5/5                        5/5       Hamstring                      5/5                        5/5         Gastrocs                    5/5                        5/5       Tib. Anterior                     5/5                        5/5       EHL                      5/5                        5/5       Babinski                 Down                                      Down                   *  Structural Exam  Inspection of the spine reveals a high left T rotatory curve, right mid T spine curve scoliosis.    No exaggerated kyphosis or lordosis.  Head is forward of the pelvis in the sagittal plane.    Cervical spine ROM full. No spasming. No tenderness to palpation.  No Spurling's or L'Hermitte's.   Lumbar ROM full.  Small hair patch at low lumbar spine. No spasming, no tenderness, no step offs. No SLR.  No SI tenderness. No trochanteric bursal tenderness. Negative Jean's. Negative 's.    Feet are warm, intact dorsalis pedis pulses, flat-arches.  Gait narrow, smooth, no scissoring. No antalgia.     ASSESSMENT/PLAN:  1. Thoracogenic scoliosis of thoracic region    This nice gentleman has a high thoracic curve.  We are going to get standing views to assess overall spinal balance  Those were done 3/8/17  1. Double thoracic  major curve scoliosis with slight increased  curvature of the main thoracic spine.  2. Borderline positive global coronal imbalance.   If surgery is planned, the Avila's angle and other measurements will be  deferred to orthopedician  There is also a completely horizontal sacrum.     AP lateral bending views to assess  which are structural and which are compensatory,    Done 3/8/17  Proximal thoracic curvature persists with its lateral left  sided bending view. Main thoracic curvature decreases with ipsilateral  right bending view but persists with greater than 25 degrees of curve    MRI of the cervical spine because of the unusual nature of his tingling.  I doubt this is cervical, but since his fingers are tingling as well as his knee caps, we will have to take a look and see if the cord is being impinged in any way.  Done 3/8/17  Cervicothoracic scoliosis. No significant spinal canal or neural  foraminal stenosis. No cord signal abnormality     MRI of the lumbar spine to evaluate for tethering, again since he has an unusual presentation with such a high curve.   Done 3/8/17  1. Normal positioning of the conus medullaris. No evidence of cord  tethering.  2. Mild lumbar spondylosis with disc degeneration at L3-L4 and L5-S1.  Mild bilateral neural foraminal stenosis at L5-S1. No significant  spinal canal stenosis.  3. Normal variant transitional lumbosacral anatomy     We also need to get whatever old imaging he has from [facility], and I would like to get the office notes from, if possible, 5 years ago.     He has cafe-au-lait spots but no other evidence of NF.   A hair patch in the lumbar spine but no  evidence of tethered cord, no Chiari, and his feet have flat arches.  He has a Marfanoid appearance but his palate is normal.  He can touch his thumb to his forearm but the wrist is not hyperflexible.  We were unable to do wingspans. Overall an unusual mix of findings.     I have discussed all this and reviewed the  images as well as the findings with Dr. Barrientos, and she met with him today.  Surgical decision will be based in part on if there has been progression of the curve.  We will need to enlist a spinal deformity surgeon, and so we have made the referral to Dr Xavier.    In the meantime we'll try again to get the old images from [facility].  He knows he can call should he have any questions, comments or concerns between now and the time of the next visit.  It has been a great pleasure assisting in the care of this very nice patient.  We appreciate your confidence in the [facility] Department of Neurosurgery.    Best Regards,    Jaylin Thomson PA-C  [facility]  Department of Neurosurgery  Phone: [phone]  Fax: [phone]

## 2017-03-30 NOTE — NURSING NOTE
Chief Complaint   Patient presents with     RECHECK     [facility] RETURN - Thoracic Spine Pain     Sarah Benson MA

## 2017-03-30 NOTE — MR AVS SNAPSHOT
After Visit Summary   3/30/2017    Jonathon Conde    MRN: [id number]           Patient Information     Date Of Birth          1990        Visit Information        Provider Department      3/30/2017 1:00 PM Jaylin Thomson PA-C [facility] Neurosurgery        Today's Diagnoses     Thoracogenic scoliosis of thoracic region    -  1       Follow-ups after your visit        Additional Services     ORTHOPEDICS ADULT REFERRAL       Your provider has referred you to: [facility]: Orthopaedic Clinic [facility] [phone]   [email or web address]  Dr Xavier    Please be aware that coverage of these services is subject to the terms and limitations of your health insurance plan.  Call member services at your health plan with any benefit or coverage questions.      Please bring the following to your appointment:    >>   Any x-rays, CTs or MRIs which have been performed.  Contact the facility where they were done to arrange for  prior to your scheduled appointment.    >>   List of current medications   >>   This referral request   >>   Any documents/labs given to you for this referral                  Who to contact     Please call your clinic at [phone] to:    Ask questions about your health    Make or cancel appointments    Discuss your medicines    Learn about your test results    Speak to your doctor   If you have compliments or concerns about an experience at your clinic, or if you wish to file a complaint, please contact [facility] Patient Relations at [phone] or email us at [email or web address]         Additional Information About Your Visit        MyChart Information     Wave - Private Location Appt gives you secure access to your electronic health record. If you see a primary care provider, you can also send messages to your care team and make appointments. If you have questions, please call your primary care clinic.  If you do not have a  "primary care provider, please call [phone] and they will assist you.      [employer] is an electronic gateway that provides easy, online access to your medical records. With [employer], you can request a clinic appointment, read your test results, renew a prescription or communicate with your care team.     To access your existing account, please contact your [facility] or call [phone] for assistance.        Care EveryWhere ID     This is your Care EveryWhere ID. This could be used by other organizations to access your [facility] medical records  [id number]        Your Vitals Were     Pulse Height BMI (Body Mass Index)             67 1.905 m (6' 3\") 20.7 kg/m2          Blood Pressure from Last 3 Encounters:   03/30/17 112/73   03/03/17 123/83   02/14/17 135/62    Weight from Last 3 Encounters:   03/30/17 75.1 kg (165 lb 9.6 oz)   03/03/17 74.4 kg (164 lb)   02/14/17 76.6 kg (168 lb 12.8 oz)              We Performed the Following     ORTHOPEDICS ADULT REFERRAL        Primary Care Provider Office Phone # Fax #    Kyara Vallejo [phone]       [address]  [facility] [address]        Thank you!     Thank you for choosing [facility]  for your care. Our goal is always to provide you with excellent care. Hearing back from our patients is one way we can continue to improve our services. Please take a few minutes to complete the written survey that you may receive in the mail after your visit with us. Thank you!             Your Updated Medication List - Protect others around you: Learn how to safely use, store and throw away your medicines at www.disposemymeds.org.          This list is accurate as of: 3/30/17  6:11 PM.  Always use your most recent med list.                   Brand Name Dispense Instructions for use    PROBIOTIC & ACIDOPHILUS EX ST PO      Take 1 teaspoonful by mouth daily         "

## 2017-03-30 NOTE — LETTER
3/30/2017       RE: Jonathon Conde  [address]   [place] [address]     Dear Colleague,    Thank you for referring your patient, Jonathon Conde, to the [facility] NEUROSURGERY at [facility]. Please see a copy of my visit note below.    Neurosurgery Follow up  Date of Visit: 3/30/2017      CHIEF COMPLAINT:  Thoracic spine pain.  HPI   This very pleasant 26-year-old male was first found to have a thoracic deformity, to the best of his recollection, about 5 years ago when he went to an urgent care at [facility] for some tingling between the shoulder blades and pain in the anterior left ribs. There a plain x-ray was done revealing a high thoracic deformity, but because he was uninsured at the time, had no free cash to be able to get treatment, he never saw the specialist to which he was referred and has had no further evaluation of any kind.      He relates, by way of history, that at the age of 12, he had a hockey accident in which he was hit from the right, landed hard on his right shoulder and broke the right collarbone and had a right AC joint separation for which he underwent open reduction and pinning of that bone.  He wore a sling for a number of weeks.  He was never told at that time that he had any spinal problems, and the only reason he brings it up because he speculates that this particular accident caused his high thoracic deformity. He also reports that in scoliosis screenings in his school he was never told he had a curve.     Over the past 5 years his discomforts have become a bit more intense, and while he would like to have gotten treatment, he has not had insurance until relatively recently.  Since he now has insurance, he would like to undergo a full evaluation and treatment. To that end, he saw his PCP who ordered some plain films and MRI of the thoracic spine.      Symptoms he describes as discomfort between the shoulder blades, sometimes  "painful numbness between the shoulder blades, a painful numbness in the anterior left rib cage, just below the breast tissue, does not radiate distally.  He has no arm pain, but he does note that he has tingling in the fingertips, as well as both knees when he spends more than a few seconds looking down or if he stretches his hands out in front of him to reach for something or pick something up.  This has generally present if both arms or reaching..  There is no shock-like sensation.  There are no changes in unilateral sensation if he turns his head from one side to the other or looks up or down, only bilateral symptoms if he looks down or reaches forward.  No loss of bowel or bladder control.  No clumsiness in the hands or feet.  No weakness.   He had a partial work up when he presented here. We completed the workup and findings are noted in Assessment and Plan.      Current Outpatient Prescriptions:      Probiotic Product (PROBIOTIC & ACIDOPHILUS EX ST PO), Take 1 teaspoonful by mouth daily, Disp: , Rfl:     Allergies   Allergen Reactions     Sulfa Drugs      PN: LW Reaction: rash     PMH, FAM HX, SOC HX,Problem list : is reviewed in Epic.  + smoker     OBJECTIVE:   /73 (BP Location: Left arm, Patient Position: Chair, Cuff Size: Adult Regular)  Pulse 67  Ht 1.905 m (6' 3\")  Wt 75.1 kg (165 lb 9.6 oz)  BMI 20.7 kg/m2    Imaging:  These are the pertinent  findings from:   Plain films of the thoracic spine performed on 01/30/2017 reveal a high left upper thoracic curve, apex about T4.  Avila angle measurements presented is about 55 degrees with what appears to be a compensatory curve right mid thoracic spine, apex about T9.  Vertebral bodies appeared to be appropriately segmented.  Disks are present at every level.      MRI of the thoracic spine performed 02/15/2017 is remarkable for the previously noted scoliotic curvature, no significant canal or foraminal stenosis.  The cord does abut the left lateral " aspect of the bony canal in the mid to lower thoracic region and the right lateral canal in the upper thoracic region.  No cord signal change, syrinx or other structural abnormality noted within the cord itself.       See the full report in EPIC/Media tab.  I personally reviewed the images with the patient.      Exam:  Pertinent positives include multiple cafe-au-lait spots, 1 large on the posterior right torso, smaller on the limbs, a small hairy patch but without dimpling in the low mid lumbar spine.  Patient has no clubbing of the feet.  He is tall and slender but palate is normal arch.  Measured height today is 6 feet 2-1/2 inches tall in stocking feet.  Wing span is not performed today as we do not have that measuring device set up in the clinic.  Thumbs can touch the wrists bilaterally but the wrists are not hyperflexible.  He has a completely normal neurologic exam.  He has a high left thoracic rotatory curvature with rib hump and a mid right thoracic mildly rotated curvature.  Normal cervical and lumbar range of motions    *   Well developed, well nourished male found seated comfortably in exam room chair.  He is able to sit and rise independently.  He is accompanied by female .    *  Mental Status  A&O X3.  Bright, alert, affable, interactive. Language fluid, fund of knowledge intact. Good historian.  Mood and affect congruent and WNL.   Palate flat, not arched.   *  Cranial Nerves  II: Able to read printed forms, VF full to gross confrontation.  III: IV, VI:  PERRLA, EOMI, No nystagmus, no ptosis.  V: Sensation intact in bilateral V1, V2, and V3. Jaw clench symmetric.    VII:  Intact to voice bilaterally.  IX:  Pushes tongue against bilateral cheeks.  X:  Palate elevates, uvula midline, phonation intact.  XI: Elevates shoulders, head turn intact.  XII: Tongue midline. No fasciculations.  *  Cerebellar:  Finger nose accurate.  *  Kal-cranial:    No drift.  *  Cervical Spine:  DTR's at Biceps,  Triceps, and Brachioradialis 2/4 and symmetric.  Sensation intact.    No Mcgowan's. No Phalen's.  No Tinel's. No fasciculations.   Muscle bulk and tone WNL.  Upper Extremity Strength.              RIGHT                LEFT     Deltoid              5/5                   5/5       Biceps              5/5                   5/5        Triceps              5/5                   5/5       Wrist Extensor              5/5                   5/5                     5/5                    5/5       Interossei              5/5                   5/5       EPL              5/5                    5/5       Pinch              5/5                  5/5          *  Lumbar Spine:    Able to heel and toe stand.   DTR's Patellar and Achilles 1/4 and symmetric.   No fasciculations.  Muscle bulk and tone WNL.  No Clonus.  Sensation intact.    Lower Extremity Strength                   RIGHT                   LEFT     Iliopsoas                    5/5                      5/5       Quad                    5/5                        5/5       Hamstring                      5/5                        5/5         Gastrocs                    5/5                        5/5       Tib. Anterior                     5/5                        5/5       EHL                      5/5                        5/5       Babinski                 Down                                      Down                   *  Structural Exam  Inspection of the spine reveals a high left T rotatory curve, right mid T spine curve scoliosis.    No exaggerated kyphosis or lordosis.  Head is forward of the pelvis in the sagittal plane.    Cervical spine ROM full. No spasming. No tenderness to palpation.  No Spurling's or L'Hermitte's.   Lumbar ROM full.  Small hair patch at low lumbar spine. No spasming, no tenderness, no step offs. No SLR.  No SI tenderness. No trochanteric bursal tenderness. Negative Jean's. Negative 's.    Feet are warm, intact dorsalis pedis  pulses, flat-arches.  Gait narrow, smooth, no scissoring. No antalgia.     ASSESSMENT/PLAN:  1. Thoracogenic scoliosis of thoracic region    This nice gentleman has a high thoracic curve.  We are going to get standing views to assess overall spinal balance  Those were done 3/8/17  1. Double thoracic major curve scoliosis with slight increased  curvature of the main thoracic spine.  2. Borderline positive global coronal imbalance.   If surgery is planned, the Avila's angle and other measurements will be  deferred to orthopedician  There is also a completely horizontal sacrum.     AP lateral bending views to assess  which are structural and which are compensatory,    Done 3/8/17  Proximal thoracic curvature persists with its lateral left  sided bending view. Main thoracic curvature decreases with ipsilateral  right bending view but persists with greater than 25 degrees of curve    MRI of the cervical spine because of the unusual nature of his tingling.  I doubt this is cervical, but since his fingers are tingling as well as his knee caps, we will have to take a look and see if the cord is being impinged in any way.  Done 3/8/17  Cervicothoracic scoliosis. No significant spinal canal or neural  foraminal stenosis. No cord signal abnormality     MRI of the lumbar spine to evaluate for tethering, again since he has an unusual presentation with such a high curve.   Done 3/8/17  1. Normal positioning of the conus medullaris. No evidence of cord  tethering.  2. Mild lumbar spondylosis with disc degeneration at L3-L4 and L5-S1.  Mild bilateral neural foraminal stenosis at L5-S1. No significant  spinal canal stenosis.  3. Normal variant transitional lumbosacral anatomy     We also need to get whatever old imaging he has from [facility], and I would like to get the office notes from, if possible, 5 years ago.     He has cafe-au-lait spots but no other evidence of NF.   A hair patch in the lumbar spine but no  evidence of  tethered cord, no Chiari, and his feet have flat arches.  He has a Marfanoid appearance but his palate is normal.  He can touch his thumb to his forearm but the wrist is not hyperflexible.  We were unable to do wingspans. Overall an unusual mix of findings.     I have discussed all this and reviewed the images as well as the findings with Dr. Barrientos, and she met with him today.  Surgical decision will be based in part on if there has been progression of the curve.  We will need to enlist a spinal deformity surgeon, and so we have made the referral to Dr Xavier.    In the meantime we'll try again to get the old images from [facility].  He knows he can call should he have any questions, comments or concerns between now and the time of the next visit.  It has been a great pleasure assisting in the care of this very nice patient.  We appreciate your confidence in the [facility] Department of Neurosurgery.    Best Regards,    Jaylin Thomson PA-C  [facility]  Department of Neurosurgery  Phone: [phone]  Fax: [phone]

## 2017-06-23 ENCOUNTER — PRE VISIT (OUTPATIENT)
Dept: ORTHOPEDICS | Facility: CLINIC | Age: 27
End: 2017-06-23

## 2017-06-23 NOTE — TELEPHONE ENCOUNTER
1.  Date/reason for appt: 6/29/17 - Thoracogenic Scoliosis of Thoracic Region  2.  Referring provider: Marcelina Thomson PA-C  3.  Call to patient (Yes / No - short description): no, pt is referred  4.  Previous care at / records requested from:    - [facility] -- Records in [facility] with Marcelina Thomson PA-C   - [facility] -- records and imaging in [facility]/pacs   - [facility] -- Records scanned in [facility]

## 2017-06-29 ENCOUNTER — OFFICE VISIT (OUTPATIENT)
Dept: ORTHOPEDICS | Facility: CLINIC | Age: 27
End: 2017-06-29

## 2017-06-29 VITALS — BODY MASS INDEX: 20.65 KG/M2 | HEIGHT: 75 IN | WEIGHT: 166.1 LBS

## 2017-06-29 DIAGNOSIS — M41.9 SCOLIOSIS OF THORACIC SPINE, UNSPECIFIED SCOLIOSIS TYPE: Primary | ICD-10-CM

## 2017-06-29 NOTE — NURSING NOTE
"Reason For Visit:   Chief Complaint   Patient presents with     Eval/Assessment     scoliosis       Primary MD: Kyara Quiñones  Ref. MD: Dr. Barrientos      Occupation ..  Currently working? Yes.  Work status?  Full time.  Date of injury: none    Date of surgery: none    Smoker: Yes, 1 pack every 3-4 days      Ht 1.895 m (6' 2.61\")  Wt 75.3 kg (166 lb 1.6 oz)  BMI 20.98 kg/m2    Pain Assessment  Patient Currently in Pain: Yes  0-10 Pain Scale:  (6 - 9)  Primary Pain Location: Back  Pain Orientation: Upper, Lower  Other Pain Locations: left rib area  Pain Descriptors: Aching, Constant  Alleviating Factors: Other (comment) (repositioning / adjust posture)  Aggravating Factors: Sitting, Movement, Other (comment) (lifting)              Numeric Rating Scale:  VAS Scores     VAS Survey 6/29/2017   What is your level of back pain during the last week: 8.0   What is your level of RIGHT leg pain during the last week: 0   What is your level of LEFT leg pain during the last week: 0   What is your level of neck pain during the last week: 7.0   What is your level of RIGHT arm pain during the last week: 0   What is your level of LEFT arm pain during the last week: 0                Promis 10 Assessment    PROMIS 10 6/29/2017   In general, would you say your health is: Very Good = 4   In general, would you say your quality of life is: Very good = 4   In general, how would you rate your physical health? Very good = 4   In general, how would you rate your mental health, including your mood and your ability to think? Very good = 4   In general, how would you rate your satisfaction with your social activities and relationships? Very good = 4   In general, please rate how well you carry out your usual social activities and roles Good = 3   To what extent are you able to carry out your everyday physical activities such as walking, climbing stairs, carrying groceries, or moving a chair? Mostly = 4   How often have you " been bothered by emotional problems such as feeling anxious, depressed or irritable? Rarely = 2   How would you rate your fatigue on average? Moderate = 3   How would you rate your pain on average?   0 = No Pain  to  10 = Worst Imaginable Pain 7   Global Physical Health Score : Raw Score 13 (SUM : G03 - G06 - G07 - G08)   Global Mentall Health Score : Raw Score 16 (SUM : G02 - G04 - G05 - G10)   Total (Physical + Mental Health Score) 29

## 2017-06-29 NOTE — LETTER
2017      RE: Jonathon Conde  [address] [place] [id number]       REASON FOR VISIT: Scoliosis    REFERRING PROVIDER: Jaylin Thomson     PRIMARY CARE PHYSICIAN: Kyara Quiñones    HISTORY OF PRESENT ILLNESS: Jonathon Conde is a 26 year old male who presents to clinic today for an evaluation of scoliosis.  He was referred for orthopedic consultation by our colleagues in neurosurgery.  Jonathon reports that he was first diagnosed with scoliosis at the age of 18 when he was seen in a local urgent care for left-sided rib pain.  At that time he was unable to seek treatment due to a lack of insurance.  Over the past few years, his symptoms have worsened.  He notices the deformity more, including uneven shoulders and left sided rib protrusion.  His main compliant is pain.  He describes a constant dull pain in the thorax.  He states that it is always about 4-5/10.  The pain is worse looking downward.  When he is laying on his back, he sometimes has pins and needles in the left side of the chest, just below the nipple line.  He also reports pins and needles in both hands and the tops of the knees.  He does not currently take any prescription or over-the-counter medications to alleviate the pain.  He does occasionally use cannabidiol, which does not contain THC.  He has tried ibuprofen and Tylenol in the past, without any relief.  He denies incontinence of bowel and bladder.    He states that he has no known family history of scoliosis or other spine problems.  He was the product of a full-term,  delivery.  His girlfriend notes that the umbilical cord was wrapped around his neck at birth.       SRS-30: 63%  [id number]: 29   Pain scale: 6-9       ROS, past medical, past surgical, social, family history, medications, and allergies reviewed on the orthopaedic surgery intake form which is scanned into the electronic record with pertinent positives commented on.    Allergies   Allergen Reactions      Sulfa Drugs      PN: LW Reaction: rash       Past Medical History:   Diagnosis Date     Scoliosis        Past Surgical History:   Procedure Laterality Date     CLAVICLE SURGERY Right Age 12       Family History   Problem Relation Age of Onset     MENTAL ILLNESS Maternal Grandmother      CANCER No family hx of      DIABETES No family hx of      HEART DISEASE No family hx of        Social History   Substance Use Topics     Smoking status: Current Every Day Smoker     Packs/day: 0.25     Types: Cigarettes     Smokeless tobacco: Current User     Alcohol use 0.6 - 1.2 oz/week     1 - 2 Cans of beer per week      Comment: DAILY       No current outpatient prescriptions on file.     No current facility-administered medications for this visit.      PHYSICAL EXAM:   Constitutional - Patient is healthy, well-nourished and appears stated age.    BMI = 20.98    Respiratory - Patient is breathing normally and in no respiratory distress.   Skin - No suspicious rashes or lesions.   Psychiatric - Normal mood and affect.   Eyes - Visual acuity is normal to the written word.   ENT - Hearing intact to the spoken word.   GI - No abdominal distention.   Musculoskeletal - Non-antalgic gait without use of assistive devices.        Cervical spine:    Appearance - Forward head posture in the sagittal plane    Palpation - Non-tender to palpation    ROM - Full    Motor -     UPPER EXTREMITY Left Right   Finger ab/adduction 5/5 5/5   Wrist flexion 5/5 5/5   Wrist extension 5/5 5/5   Elbow flexion 5/5 5/5   Elbow extension 5/5 5/5   Shoulder flexion 5/5 5/5           Thoracic Spine:    Appearance - High left curve and right mid-thoracic curve    Palpation - Non-tender to palpation    Strength/ROM - deferred            Lumbar Spine:    Appearance - Normal    Palpation - Non-tender to palpation    ROM - Full     Motor -        LOWER EXTREMITY Left Right   Hip flexion 5/5 5/5   Knee flexion 5/5 5/5   Knee extension 5/5 5/5   Ankle dorsiflexion 5/5  5/5   Ankle plantarflexion 5/5 5/5   Great toe extension 5/5 5/5   Great toe flexion 5/5 5/5        Neurologic - Sensation intact to light touch bilaterally.      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  It appears as though the cord pushes up against the left side. This was discussed with the neuroradiologist.  See full radiologic report in chart.    CLINICAL ASSESSMENT:   Double major curve thoracic scoliosis    DISCUSSION/PLAN:   Jonathon is a 26 year old male who presented to clinic today for evaluation of scoliosis.  He was referred by our colleagues in neurosurgery.  We reviewed previous imaging in detail together and discussed the different types of scoliosis.  In reviewing his imaging, we discussed that the potential abnormality in his cord may have occurred around six weeks of gestation.  This is also when the heart and GI tracts are developing.  These systems may need to be assessed if we proceed with surgery.  We briefly discussed spinal fusion, but ultimately we should assess his spine in more detail.  Therefore, we should obtain a CT myelogram for better bony detail.  He should return to clinic to discuss the results.  At that time, we can have a more thorough surgical discussion.  In the meantime, he should be working hard on smoking cessation.      All questions and concerns were answered to the patient's apparent satisfaction before leaving the clinic.     Thank you for allowing Dr. Xavier and I to participate in the care of Jonathon Conde.    Respectfully,  Rosanne Cain PA-C    CC  Copy to patient  KENIA CONDE   [address]   [place] [address]

## 2017-06-29 NOTE — MR AVS SNAPSHOT
After Visit Summary   6/29/2017    Jonathon Conde    MRN: [id number]           Patient Information     Date Of Birth          1990        Visit Information        Provider Department      6/29/2017 10:00 AM Patrick Xavier MD [facility]        Today's Diagnoses     Scoliosis of thoracic spine, unspecified scoliosis type    -  1       Follow-ups after your visit        Your next 10 appointments already scheduled     Jul 13, 2017  8:00 AM CDT   XR MYELOGRAM THORACIC SPINE with UCXR3,  IMAGING NURSE,  NEURO RAD   [facility] ([facility])    [address] [address] [phone]           For nerve root injection, please send or bring copies of any MRIs or other scans you have had.  Bring a list of your current medicines to your exam. (Include vitamins, minerals and over-the-counter medicines.) Leave your valuables at home.  Plan to have someone drive you home afterward.  Stop taking the following medicines (but talk to your doctor first):   If you take blood thinners, you may need to stop taking them a few days before treatment. Talk to your doctor before stopping these medicines.Stop taking Coumadin (warfarin) 3 days before treatment. Restart the day after treatment.   If you take Plavix, Ticlid, Pletal or Persantine, please ask your doctor if you should stop these medicines. You may need extra tests on the morning of your scan.   Stop taking medicine for depression or other mental health concerns 24 hours before your exam, if your doctor says it is safe to do so. You may take your other medicines as normal.  Stop all food and drink (including water) 3 hours before your test or treatment. Drink at least four to six glasses of water the night before your exam.  Please tell the doctor:   If you are allergic to X-ray dye (contrast fluid).   If you may be pregnant.  Injections take about 30 to 45  minutes. Most people spend up to 2 hours in the clinic or hospital.  Please call the Imaging Department at your exam site with any questions            Jul 13, 2017  9:00 AM CDT   (Arrive by 8:45 AM)   CT THORACIC SPINE W/O CONTRAST with UCCT2   [facility] CT ([facility])    [address] [facility] [facility] [address] [facility] [address]  [address] [facility] [id number] [phone]           Please bring any scans or X-rays taken at other hospitals, if similar tests were done. Also bring a list of your medicines, including vitamins, minerals and over-the-counter drugs. It is safest to leave personal items at home.  Be sure to tell your doctor:   If you have any allergies.   If there s any chance you are pregnant.   If you are breastfeeding.   If you have any special needs.  You do not need to do anything special to prepare.  Please wear loose clothing, such as a sweat suit or jogging clothes. Avoid snaps, zippers and other metal. We may ask you to undress and put on a hospital gown.            Aug 31, 2017 11:45 AM CDT   (Arrive by 11:15 AM)   RETURN SCOLIOSIS with Patrick Xavier MD   [facility] ([facility])    [address] [facility]  [address] [facility] [address] [phone]              Future tests that were ordered for you today     Open Future Orders        Priority Expected Expires Ordered    CT Thoracic Spine Post Myelogram Routine  6/29/2018 6/29/2017    Myelogram thoracic Routine 6/29/2017 6/29/2018 6/29/2017            Who to contact     Please call your clinic at [phone] to:    Ask questions about your health    Make or cancel appointments    Discuss your medicines    Learn about your test results    Speak to your doctor   If you have compliments or concerns about an experience at your clinic, or if you wish to file a complaint, please contact [facility] Patient Relations at [phone] or email us at  [email or web address]         Additional Information About Your Visit        Taxizuhart Information     WeGathert gives you secure access to your electronic health record. If you see a primary care provider, you can also send messages to your care team and make appointments. If you have questions, please call your primary care clinic.  If you do not have a primary care provider, please call [phone] and they will assist you.      IroFit is an electronic gateway that provides easy, online access to your medical records. With IroFit, you can request a clinic appointment, read your test results, renew a prescription or communicate with your care team.     To access your existing account, please contact your [facility] or call [phone] for assistance.        Care EveryWhere ID     This is your Care EveryWhere ID. This could be used by other organizations to access your [place] medical records  [id number]        Your Vitals Were     Height BMI (Body Mass Index)                1.895 m (6' 2.61\") 20.98 kg/m2           Blood Pressure from Last 3 Encounters:   03/30/17 112/73   03/03/17 123/83   02/14/17 135/62    Weight from Last 3 Encounters:   06/29/17 75.3 kg (166 lb 1.6 oz)   03/30/17 75.1 kg (165 lb 9.6 oz)   03/03/17 74.4 kg (164 lb)                 Today's Medication Changes          These changes are accurate as of: 6/29/17 11:37 AM.  If you have any questions, ask your nurse or doctor.               Stop taking these medicines if you haven't already. Please contact your care team if you have questions.     PROBIOTIC & ACIDOPHILUS EX ST PO                    Primary Care Provider Office Phone # Fax #    Kyara aVllejo [phone]       [address]  [facility] [address]        Equal Access to Services     ANALI MILLIGAN AH: Parrish Dumont, maury gonzalez, wyatt lora " stefaniacindybettye coelhomarisela dorcasean laivanbettye ah. So [facility] [phone].    ATENCIÓN: Si habla emeraldañol, tiene a perrin disposición servicios gratuitos de asistencia lingüística. Nafisa al [phone].    We comply with applicable federal civil rights laws and [place] laws. We do not discriminate on the basis of race, color, national origin, age, disability sex, sexual orientation or gender identity.            Thank you!     Thank you for choosing [facility]  for your care. Our goal is always to provide you with excellent care. Hearing back from our patients is one way we can continue to improve our services. Please take a few minutes to complete the written survey that you may receive in the mail after your visit with us. Thank you!             Your Updated Medication List - Protect others around you: Learn how to safely use, store and throw away your medicines at www.disposemymeds.org.      Notice  As of 6/29/2017 11:37 AM    You have not been prescribed any medications.

## 2017-06-29 NOTE — PROGRESS NOTES
REASON FOR VISIT: Scoliosis    REFERRING PROVIDER: Jaylin Thomson     PRIMARY CARE PHYSICIAN: Kyara Quiñones    HISTORY OF PRESENT ILLNESS: Jonathon Conde is a 26 year old male who presents to clinic today for an evaluation of scoliosis.  He was referred for orthopedic consultation by our colleagues in neurosurgery.  Jonathon reports that he was first diagnosed with scoliosis at the age of 18 when he was seen in a local urgent care for left-sided rib pain.  At that time he was unable to seek treatment due to a lack of insurance.  Over the past few years, his symptoms have worsened.  He notices the deformity more, including uneven shoulders and left sided rib protrusion.  His main compliant is pain.  He describes a constant dull pain in the thorax.  He states that it is always about 4-5/10.  The pain is worse looking downward.  When he is laying on his back, he sometimes has pins and needles in the left side of the chest, just below the nipple line.  He also reports pins and needles in both hands and the tops of the knees.  He does not currently take any prescription or over-the-counter medications to alleviate the pain.  He does occasionally use cannabidiol, which does not contain THC.  He has tried ibuprofen and Tylenol in the past, without any relief.  He denies incontinence of bowel and bladder.    He states that he has no known family history of scoliosis or other spine problems.  He was the product of a full-term,  delivery.  His girlfriend notes that the umbilical cord was wrapped around his neck at birth.       SRS-30: 63%  Kiunbc30: 29   Pain scale: 6-9       ROS, past medical, past surgical, social, family history, medications, and allergies reviewed on the orthopaedic surgery intake form which is scanned into the electronic record with pertinent positives commented on.    Allergies   Allergen Reactions     Sulfa Drugs      PN: LW Reaction: rash       Past Medical History:   Diagnosis Date      Scoliosis        Past Surgical History:   Procedure Laterality Date     CLAVICLE SURGERY Right Age 12       Family History   Problem Relation Age of Onset     MENTAL ILLNESS Maternal Grandmother      CANCER No family hx of      DIABETES No family hx of      HEART DISEASE No family hx of        Social History   Substance Use Topics     Smoking status: Current Every Day Smoker     Packs/day: 0.25     Types: Cigarettes     Smokeless tobacco: Current User     Alcohol use 0.6 - 1.2 oz/week     1 - 2 Cans of beer per week      Comment: DAILY       No current outpatient prescriptions on file.     No current facility-administered medications for this visit.      PHYSICAL EXAM:   Constitutional - Patient is healthy, well-nourished and appears stated age.    BMI = 20.98    Respiratory - Patient is breathing normally and in no respiratory distress.   Skin - No suspicious rashes or lesions.   Psychiatric - Normal mood and affect.   Eyes - Visual acuity is normal to the written word.   ENT - Hearing intact to the spoken word.   GI - No abdominal distention.   Musculoskeletal - Non-antalgic gait without use of assistive devices.        Cervical spine:    Appearance - Forward head posture in the sagittal plane    Palpation - Non-tender to palpation    ROM - Full    Motor -     UPPER EXTREMITY Left Right   Finger ab/adduction 5/5 5/5   Wrist flexion 5/5 5/5   Wrist extension 5/5 5/5   Elbow flexion 5/5 5/5   Elbow extension 5/5 5/5   Shoulder flexion 5/5 5/5           Thoracic Spine:    Appearance - High left curve and right mid-thoracic curve    Palpation - Non-tender to palpation    Strength/ROM - deferred            Lumbar Spine:    Appearance - Normal    Palpation - Non-tender to palpation    ROM - Full     Motor -        LOWER EXTREMITY Left Right   Hip flexion 5/5 5/5   Knee flexion 5/5 5/5   Knee extension 5/5 5/5   Ankle dorsiflexion 5/5 5/5   Ankle plantarflexion 5/5 5/5   Great toe extension 5/5 5/5   Great toe  flexion 5/5 5/5        Neurologic - Sensation intact to light touch bilaterally.      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  It appears as though the cord pushes up against the left side. This was discussed with the neuroradiologist.  See full radiologic report in chart.    CLINICAL ASSESSMENT:   Double major curve thoracic scoliosis    DISCUSSION/PLAN:   Jonathon is a 26 year old male who presented to clinic today for evaluation of scoliosis.  He was referred by our colleagues in neurosurgery.  We reviewed previous imaging in detail together and discussed the different types of scoliosis.  In reviewing his imaging, we discussed that the potential abnormality in his cord may have occurred around six weeks of gestation.  This is also when the heart and GI tracts are developing.  These systems may need to be assessed if we proceed with surgery.  We briefly discussed spinal fusion, but ultimately we should assess his spine in more detail.  Therefore, we should obtain a CT myelogram for better bony detail.  He should return to clinic to discuss the results.  At that time, we can have a more thorough surgical discussion.  In the meantime, he should be working hard on smoking cessation.      All questions and concerns were answered to the patient's apparent satisfaction before leaving the clinic.     Thank you for allowing Dr. Xavier and I to participate in the care of Jonathon Conde.    Respectfully,  Rosanne Cain PA-C  I saw and evaluated the patient on the day of the visit and I formulated the plan.  Patrick Xavier MD    CC  Copy to patient  VIKTORIYA,KENIA   [address]   [place] [address]

## 2017-06-29 NOTE — Clinical Note
6/29/2017       RE: Jonathon Conde  [address]   [place] [address]     Dear Colleague,    Thank you for referring your patient, Jonathon Conde, to the [facility] at [facility]. Please see a copy of my visit note below.    No notes on file    Again, thank you for allowing me to participate in the care of your patient.      Sincerely,    Patrick Xavier MD

## 2017-07-03 ENCOUNTER — TELEPHONE (OUTPATIENT)
Dept: GENERAL RADIOLOGY | Facility: CLINIC | Age: 27
End: 2017-07-03

## 2017-07-06 ENCOUNTER — TELEPHONE (OUTPATIENT)
Dept: GENERAL RADIOLOGY | Facility: CLINIC | Age: 27
End: 2017-07-06

## 2017-07-17 ENCOUNTER — HOSPITAL ENCOUNTER (EMERGENCY)
Facility: CLINIC | Age: 27
Discharge: HOME | End: 2017-07-17
Attending: EMERGENCY MEDICINE | Admitting: EMERGENCY MEDICINE
Payer: COMMERCIAL

## 2017-07-17 ENCOUNTER — TELEPHONE (OUTPATIENT)
Dept: ORTHOPEDICS | Facility: CLINIC | Age: 27
End: 2017-07-17

## 2017-07-17 VITALS
OXYGEN SATURATION: 100 % | BODY MASS INDEX: 20.89 KG/M2 | WEIGHT: 168 LBS | DIASTOLIC BLOOD PRESSURE: 82 MMHG | TEMPERATURE: 97.6 F | HEART RATE: 64 BPM | HEIGHT: 75 IN | RESPIRATION RATE: 14 BRPM | SYSTOLIC BLOOD PRESSURE: 118 MMHG

## 2017-07-17 DIAGNOSIS — R51.9 ACUTE NONINTRACTABLE HEADACHE, UNSPECIFIED HEADACHE TYPE: ICD-10-CM

## 2017-07-17 LAB
ANION GAP SERPL CALCULATED.3IONS-SCNC: 4 MMOL/L (ref 3–14)
BASOPHILS # BLD AUTO: 0 10E9/L (ref 0–0.2)
BASOPHILS NFR BLD AUTO: 0.3 %
BUN SERPL-MCNC: 10 MG/DL (ref 7–30)
CALCIUM SERPL-MCNC: 8.8 MG/DL (ref 8.5–10.1)
CHLORIDE SERPL-SCNC: 105 MMOL/L (ref 94–109)
CO2 SERPL-SCNC: 29 MMOL/L (ref 20–32)
CREAT SERPL-MCNC: 0.94 MG/DL (ref 0.66–1.25)
DIFFERENTIAL METHOD BLD: ABNORMAL
EOSINOPHIL # BLD AUTO: 0.1 10E9/L (ref 0–0.7)
EOSINOPHIL NFR BLD AUTO: 0.8 %
ERYTHROCYTE [DISTWIDTH] IN BLOOD BY AUTOMATED COUNT: 12.4 % (ref 10–15)
GFR SERPL CREATININE-BSD FRML MDRD: NORMAL ML/MIN/1.7M2
GLUCOSE SERPL-MCNC: 89 MG/DL (ref 70–99)
HCT VFR BLD AUTO: 44.1 % (ref 40–53)
HGB BLD-MCNC: 14.9 G/DL (ref 13.3–17.7)
IMM GRANULOCYTES # BLD: 0 10E9/L (ref 0–0.4)
IMM GRANULOCYTES NFR BLD: 0.3 %
INR PPP: 1 (ref 0.86–1.14)
LYMPHOCYTES # BLD AUTO: 1.8 10E9/L (ref 0.8–5.3)
LYMPHOCYTES NFR BLD AUTO: 15.5 %
MCH RBC QN AUTO: 31.2 PG (ref 26.5–33)
MCHC RBC AUTO-ENTMCNC: 33.8 G/DL (ref 31.5–36.5)
MCV RBC AUTO: 93 FL (ref 78–100)
MONOCYTES # BLD AUTO: 0.9 10E9/L (ref 0–1.3)
MONOCYTES NFR BLD AUTO: 7.2 %
NEUTROPHILS # BLD AUTO: 8.9 10E9/L (ref 1.6–8.3)
NEUTROPHILS NFR BLD AUTO: 75.9 %
NRBC # BLD AUTO: 0 10*3/UL
NRBC BLD AUTO-RTO: 0 /100
PLATELET # BLD AUTO: 263 10E9/L (ref 150–450)
POTASSIUM SERPL-SCNC: 3.9 MMOL/L (ref 3.4–5.3)
RBC # BLD AUTO: 4.77 10E12/L (ref 4.4–5.9)
SODIUM SERPL-SCNC: 138 MMOL/L (ref 133–144)
WBC # BLD AUTO: 11.7 10E9/L (ref 4–11)

## 2017-07-17 PROCEDURE — 85610 PROTHROMBIN TIME: CPT | Performed by: EMERGENCY MEDICINE

## 2017-07-17 PROCEDURE — 99284 EMERGENCY DEPT VISIT MOD MDM: CPT | Mod: Z6 | Performed by: EMERGENCY MEDICINE

## 2017-07-17 PROCEDURE — 25000128 H RX IP 250 OP 636: Performed by: EMERGENCY MEDICINE

## 2017-07-17 PROCEDURE — 99283 EMERGENCY DEPT VISIT LOW MDM: CPT | Mod: 25 | Performed by: EMERGENCY MEDICINE

## 2017-07-17 PROCEDURE — 96360 HYDRATION IV INFUSION INIT: CPT | Performed by: EMERGENCY MEDICINE

## 2017-07-17 PROCEDURE — 85025 COMPLETE CBC W/AUTO DIFF WBC: CPT | Performed by: EMERGENCY MEDICINE

## 2017-07-17 PROCEDURE — 80048 BASIC METABOLIC PNL TOTAL CA: CPT | Performed by: EMERGENCY MEDICINE

## 2017-07-17 RX ADMIN — SODIUM CHLORIDE 1000 ML: 9 INJECTION, SOLUTION INTRAVENOUS at 13:33

## 2017-07-17 ASSESSMENT — ENCOUNTER SYMPTOMS
NUMBNESS: 0
NAUSEA: 0
HEADACHES: 1
FEVER: 0
WEAKNESS: 0
VOMITING: 0
CHILLS: 0

## 2017-07-17 NOTE — ED NOTES
Jonathon comes in with YANG's, cough, and sore neck when upright after having a CT myelogram last Thursday.

## 2017-07-17 NOTE — PROGRESS NOTES
"REGIONAL ANESTHESIA PAIN SERVICE NOTE  SUBJECTIVE:   Interval History: Pt reports positional headache with slow onset of symptoms felt most with standing following CT myelogram.  Pt notes positional headache with symptoms stable from yesterday, improves with caffeine and Advil. Denies any weakness, paresthesias.  Pt ambulating without assistance, achieving ADLs.  Patient is currently without nausea. No fevers or chills.  Currently tolerating a regular diet.       Numerical Rating Scale:    Reports pain 0/10 at rest and while lying down and up to 8/10 with movement, though this is \"more like a 6-7 today.\"        OBJECTIVE:  Diagnostics:  Lab Results   Component Value Date    WBC 11.7 07/17/2017     Lab Results   Component Value Date    RBC 4.77 07/17/2017     Lab Results   Component Value Date    HGB 14.9 07/17/2017     Lab Results   Component Value Date    HCT 44.1 07/17/2017     Lab Results   Component Value Date     07/17/2017       Lab Results   Component Value Date    INR 1.00 07/17/2017       Vitals:    Temp:  [36.4  C (97.6  F)] 36.4  C (97.6  F)  Pulse:  [69] 69  Resp:  [20] 20  BP: (111-136)/(73-99) 133/83  SpO2:  [98 %-100 %] 100 %    Exam:    Strength 5/5 and symmetric grossly in bilateral LE   Site of prior injection c/d/i       ASSESSMENT/PLAN:    Jonathon Conde is a 26 year old male POD #4 s/p CT myelogram as part of a workup and evaluation for thoracic scoliosis with possible .      Discussed risks and benefits of blood patch procedure and time course of recovery and patient stated he would like to see if he has improvement with caffeine, and alternating Advil and Tylenol.     Given pager number [phone] for contact and direct admit for blood patch should symptoms not resolve.         Fili Gonzales MD  Regional Anesthesia Pain Service  7/17/2017 2:41 PM    24 hour Job Code Pager.  For in-house use only.     Dial * * *[phone] and  [place]:  [phone]  [place]: [phone]  Peds:  [phone]  Then enter " call-back number  May text page using P[employer], but NOT [employer].

## 2017-07-17 NOTE — ED AVS SNAPSHOT
[facility], [place], Emergency Department    [address] [place] [address]    Phone:  [phone]                                       Jonathon Conde   MRN: [id number]    Department:  [facility], Emergency Department   Date of Visit:  7/17/2017           After Visit Summary Signature Page     I have received my discharge instructions, and my questions have been answered. I have discussed any challenges I see with this plan with the nurse or doctor.    ..........................................................................................................................................  Patient/Patient Representative Signature      ..........................................................................................................................................  Patient Representative Print Name and Relationship to Patient    ..................................................               ................................................  Date                                            Time    ..........................................................................................................................................  Reviewed by Signature/Title    ...................................................              ..............................................  Date                                                            Time

## 2017-07-17 NOTE — ED AVS SNAPSHOT
[facility], Emergency Department    [address] [address]    Phone:  [phone]                                       Jonathon Conde   MRN: [id number]    Department:  [facility], Emergency Department   Date of Visit:  7/17/2017           Patient Information     Date Of Birth          1990        Your diagnoses for this visit were:     Acute nonintractable headache, unspecified headache type suspect low pressure headache s/p thoracic CT myelogram procedure 4 days ago       You were seen by Haily Iraheta MD and Dina Chavira MD.        Discharge Instructions       You have been seen in the ER for a low pressure headache.  This can happen when CSF (cerebrospinal fluid) leaks out around the site of a needle puncture (such as for a myelogram procedure that you had last week).  You have declined to have a blood patch procedure today.  We recommend drinking plenty of fluids, alternating tylenol and motrin, and drinking caffeine to help with this problem.  If you find that this does not start to resolve on its own, and you want to have a blood patch procedure, you should page the anesthesia resident at the pager listed below.  After the beep on the pager, put in your complete phone number with area code, and then hit the * button.  The resident will call you back and make arrangements to schedule a time for you.     Pager number: [phone].      If you decide to do this, you would then show up at the surgical admission area, Unit 3C at [facility] at the time you arranged with the anesthesia resident.    If you have severe headache that does not start to improve, if you have fever > 101 or repeated vomiting, come back to the ER for further evaluation.     Tylenol 500 mg every 4 hours as needed for pain    Motrin 600 mg every 6 hours as needed for pain (take with food)    Future Appointments        Provider Department Dept Phone Center    8/31/2017 11:45 AM Patrick Davila  MD [facility] [phone] [facility]      24 Hour Appointment Hotline       To make an appointment at any [facility], call [phone] ([phone]). If you don't have a family doctor or clinic, we will help you find one. [facility] are conveniently located to serve the needs of you and your family.             Review of your medicines      Notice     You have not been prescribed any medications.            Procedures and tests performed during your visit     Basic metabolic panel    CBC with platelets differential    INR    Peripheral IV catheter      Orders Needing Specimen Collection     None      Pending Results     No orders found from 7/15/2017 to 7/18/2017.            Pending Culture Results     No orders found from 7/15/2017 to 7/18/2017.            Pending Results Instructions     If you had any lab results that were not finalized at the time of your Discharge, you can call the ED Lab Result RN at [phone]. You will be contacted by this team for any positive Lab results or changes in treatment. The nurses are available 7 days a week from 10A to 6:30P.  You can leave a message 24 hours per day and they will return your call.        Thank you for choosing [facility]       Thank you for choosing [facility] for your care. Our goal is always to provide you with excellent care. Hearing back from our patients is one way we can continue to improve our services. Please take a few minutes to complete the written survey that you may receive in the mail after you visit with us. Thank you!        APImetricshart Information     ANPI gives you secure access to your electronic health record. If you see a primary care provider, you can also send messages to your care team and make appointments. If you have questions, please call your primary care clinic.  If you do not have a primary care provider, please call [phone] and they will assist you.        Care EveryWhere ID     This is your  Care EveryWhere ID. This could be used by other organizations to access your [place] medical records  [id number]        Equal Access to Services     ANALI MILLIGAN : Parrish Dumont, maury gonzalez, alex cantu, wyatt almonte. So [facility] [phone].    ATENCIÓN: Si habla español, tiene a perrin disposición servicios gratuitos de asistencia lingüística. Llame al [phone].    We comply with applicable federal civil rights laws and [place] laws. We do not discriminate on the basis of race, color, national origin, age, disability sex, sexual orientation or gender identity.            After Visit Summary       This is your record. Keep this with you and show to your community pharmacist(s) and doctor(s) at your next visit.

## 2017-07-17 NOTE — TELEPHONE ENCOUNTER
[facility]:  Nursing Note  SITUATION                                                      Jonathon Conde is a 26 year old male Orthopedic seen as a new pt by Dr. Xavier for scoliosis on 6/29/2017.    BACKGROUND                                                        Patient is calling reporting he had a lumbar puncture done as part of the thoracic CT and myelogram he had last Thursday 7/13/17 at 8:00 am. He reports he developed a headache at 4:00 pm that same day and has had a headache since. He did not seek treatment for this. He states if he stays horizontal he's pain free, but if moves to vertical the headache begins and his rate of pain get up to 8/10. He did vomit when he first started having the headache but has not in the recent 2 days. He is in [place] waiting to board a plan. Will review with the spine team. Paged spine provider.  Per Rosanne Cain PA-C, based on the severity of the headache pt should seek evaluation in a local ED. At the very least, he should stay horizontal. Flying is not recommended. This advice is given to pt. He expressed understanding and agreement.    Date of last clinic appointment: 6/29/2017.    Does patient have a future appointment scheduled?  Yes -  next appointment is on: August / 31, 2017        ASSESSMENT      As above.    PLAN                                                      Nursing Interventions: as above  RECOMMENDED DISPOSITION: as above  Will comply with recommendation: Yes    If further questions/concerns or if symptoms do not improve, worsen or new symptoms develop, patient/family are advised to call [phone], option #3 to speak with a triage nurse, as soon as possible.    Carolin Echavarria

## 2017-07-17 NOTE — ED PROVIDER NOTES
History     Chief Complaint   Patient presents with     Headache     HPI  Jonathon Conde is a 26 year old male with a history of scoliosis who presents for evaluation of a headache. Patient reports last Thursday he was in clinic for a thoracic CT myelogram (4 days ago). He states he was warned he could develop a headache following this procedure, and presents to the ED today with complaints of a severe, progressively worsening headache with associated swelling in his temporal regions bilaterally that worsens with prolonged sitting up that has been ongoing since the day following his procedure. Patient states his headache is significantly worsened with position changes/sitting up or standing up. He notes his headache will improve after lying flat for several minutes. Patient reports his headache started while he was in [place] over the weekend, but he was not able to present for evaluation while there due to insurance issues. He did have one episode of vomiting secondary to pain just after landing in [place] the day following his injection, but otherwise denies nausea or vomiting. He denies numbness, weakness, or paresthesias of the extremities or face. He denies vision changes, aside from intermittent pressure sensation behind his eyes. No recent trauma, fall, or recent chiropractic manipulation. No fever or chills, though has intermittent had hot flashes. No history of headaches. No rash or skin changes. He has taken four tablets of Advil today prior to arrival with no relief.       CT Thoracic Spine w/contrast (07/13/17) Impression:   1. CT myelography does not demonstrate any spinal canal or neural  foraminal stenosis. There is 55 degrees of levoconvex scoliosis  centered at T4.  2. No immediate complication of the lumbar puncture performed for this  CT myelogram.     I have reviewed the Medications, Allergies, Past Medical and Surgical History, and Social History in the [facility] system.  Past Medical History:  "  Diagnosis Date     Scoliosis        Past Surgical History:   Procedure Laterality Date     CLAVICLE SURGERY Right Age 12       Family History   Problem Relation Age of Onset     MENTAL ILLNESS Maternal Grandmother      CANCER No family hx of      DIABETES No family hx of      HEART DISEASE No family hx of        Social History   Substance Use Topics     Smoking status: Current Every Day Smoker     Packs/day: 0.25     Types: Cigarettes     Smokeless tobacco: Current User     Alcohol use 0.6 - 1.2 oz/week     1 - 2 Cans of beer per week      Comment: DAILY       No current facility-administered medications for this encounter.      No current outpatient prescriptions on file.     Facility-Administered Medications Ordered in Other Encounters   Medication     iopamidol (ISOVUE-M 200) solution 10 mL        Allergies   Allergen Reactions     Sulfa Drugs      PN: LW Reaction: rash       Review of Systems   Constitutional: Negative for chills and fever.   HENT: Negative for sore throat.    Eyes: Negative for visual disturbance.   Respiratory: Negative for cough and shortness of breath.    Cardiovascular: Negative for chest pain.   Gastrointestinal: Negative for diarrhea, nausea and vomiting.   Skin: Negative for rash.   Neurological: Positive for headaches. Negative for dizziness, weakness and numbness.   All other systems reviewed and are negative.      Physical Exam   BP: 136/83  Pulse: 69  Temp: 97.6  F (36.4  C)  Resp: 20  Height: 190.5 cm (6' 3\")  Weight: 76.2 kg (168 lb)  SpO2: 99 %  Physical Exam   Constitutional: He is oriented to person, place, and time. No distress.   Thin adult male, lying supine in bed, NAD   HENT:   Head: Atraumatic.   Mouth/Throat: Oropharynx is clear and moist. No oropharyngeal exudate.   Eyes: Pupils are equal, round, and reactive to light. No scleral icterus.   Neck:   Intact ROM, no meningeal signs   Cardiovascular: Normal rate, regular rhythm, normal heart sounds and intact distal " pulses.    No murmur heard.  Pulmonary/Chest: Effort normal and breath sounds normal. No respiratory distress.   Abdominal: Soft. Bowel sounds are normal. He exhibits no distension. There is no tenderness. There is no rebound and no guarding.   Musculoskeletal: He exhibits no edema or tenderness.   Small puncture wound noted in midline at approx L1, no surrounding erythema.       Neurological: He is alert and oriented to person, place, and time. He has normal strength. No cranial nerve deficit or sensory deficit. Coordination normal. GCS eye subscore is 4. GCS verbal subscore is 5. GCS motor subscore is 6.   Skin: Skin is warm. No rash noted. He is not diaphoretic.   Nursing note and vitals reviewed.      ED Course     ED Course     Procedures       12:34 PM  The patient was seen and examined by Dr. Chavira in Room 20.          Critical Care time:  none                        Assessments & Plan (with Medical Decision Making)   This is a very pleasant 26 year old male who presents to the Emergency Department today with a persistent headache since receiving a CT myelogram on Thursday, four days ago. It appears to be very positional where it does get worse if he sits up or stands up, after several minutes of lying supine it does go away. He has a severe 55 degree levoconvex scoliosis at T4 which is baseline for him. The study was being done in preparation for surgical planning for him.    He has no fever, no associated symptoms. I suspect that it was indeed the CT myelogram which was causing a low pressure headache for him. We did establish IV access and we have given the patient IV fluids here in the Emergency Department. He has already several doses of ibuprofen today, so I would wish to avoid IV Toradol. I suspect he is going to require a blood patch. I have spoken to Anesthesiology about this.     Anesthesia did come and see the patient in the Emergency Department. After discussion with the patient, the patient has  elected to avoid a blood patch at this point, largely for financial reasons. He also has some concern that having a blood patch would cause more headache for him.     Anesthesia spoke with the patient about making sure that he drinks plenty of fluids, alternate Tylenol and Motrin, and drinks plenty of caffeine. Anesthesia seems to think that there is a decent chance that this will resolve itself. If the patient chooses and notices that his symptoms are continuing to bother him, he can certainly call the anesthesia resident (pager number given) and try to arrange for outpatient blood patch procedure if his headache does not go away. Patient was advised to come back to the Emergency Department if he is noticing severe headache, repeated vomiting, fever, or other new or concerning symptoms. Patient verbalizes understanding.       I have reviewed the nursing notes.    I have reviewed the findings, diagnosis, plan and need for follow up with the patient.    There are no discharge medications for this patient.      Final diagnoses:   Acute nonintractable headache, unspecified headache type - suspect low pressure headache s/p thoracic CT myelogram procedure 4 days ago   I, Cleopatra Carr, am serving as a trained medical scribe to document services personally performed by Dina Chavira MD, based on the provider's statements to me.   IDina MD, was physically present and have reviewed and verified the accuracy of this note documented by Cleopatra Carr.      7/17/2017   [facility], EMERGENCY DEPARTMENT     Dina Chavira MD  07/18/17 [id number]

## 2017-07-17 NOTE — DISCHARGE INSTRUCTIONS
You have been seen in the ER for a low pressure headache.  This can happen when CSF (cerebrospinal fluid) leaks out around the site of a needle puncture (such as for a myelogram procedure that you had last week).  You have declined to have a blood patch procedure today.  We recommend drinking plenty of fluids, alternating tylenol and motrin, and drinking caffeine to help with this problem.  If you find that this does not start to resolve on its own, and you want to have a blood patch procedure, you should page the anesthesia resident at the pager listed below.  After the beep on the pager, put in your complete phone number with area code, and then hit the * button.  The resident will call you back and make arrangements to schedule a time for you.     Pager number: [phone].      If you decide to do this, you would then show up at the surgical admission area, Unit 3C at [facility] at the time you arranged with the anesthesia resident.    If you have severe headache that does not start to improve, if you have fever > 101 or repeated vomiting, come back to the ER for further evaluation.     Tylenol 500 mg every 4 hours as needed for pain    Motrin 600 mg every 6 hours as needed for pain (take with food)

## 2017-07-18 ASSESSMENT — ENCOUNTER SYMPTOMS
SHORTNESS OF BREATH: 0
DIZZINESS: 0
SORE THROAT: 0
COUGH: 0
DIARRHEA: 0

## 2017-08-31 ENCOUNTER — OFFICE VISIT (OUTPATIENT)
Dept: ORTHOPEDICS | Facility: CLINIC | Age: 27
End: 2017-08-31

## 2017-08-31 VITALS — HEIGHT: 75 IN | BODY MASS INDEX: 21.87 KG/M2 | WEIGHT: 175.9 LBS

## 2017-08-31 DIAGNOSIS — M41.9 SCOLIOSIS OF THORACIC SPINE, UNSPECIFIED SCOLIOSIS TYPE: Primary | ICD-10-CM

## 2017-08-31 NOTE — NURSING NOTE
"Reason For Visit:   Chief Complaint   Patient presents with     RECHECK     follow up CT thoracic 7/13/17       Primary MD: Kyara Quiñones  Ref. MD: Dr. Barrientos      Occupation ..  Currently working? Yes.  Work status?  Full time.  Date of injury: none    Date of surgery: none    Smoker: Yes, but less (10 cigarettes over the last 2 months)      Ht 1.9 m (6' 2.8\")  Wt 79.8 kg (175 lb 14.4 oz)  BMI 22.1 kg/m2    Pain Assessment  Patient Currently in Pain: Yes  0-10 Pain Scale: 7  Primary Pain Location: Back  Pain Orientation: Upper  Pain Descriptors: Sharp, Dull, Aching  Alleviating Factors: Other (comment) (repositioning / adjusting posture)  Aggravating Factors: Other (comment) (lifting, twisting, looking downward)              Numeric Rating Scale:  VAS Scores     VAS Survey 8/31/2017   What is your level of back pain during the last week: 6.5   What is your level of RIGHT leg pain during the last week: 0   What is your level of LEFT leg pain during the last week: 0   What is your level of neck pain during the last week: 6.5   What is your level of RIGHT arm pain during the last week: 0   What is your level of LEFT arm pain during the last week: 0                Promis 10 Assessment    PROMIS 10 8/31/2017   In general, would you say your health is: Good   In general, would you say your quality of life is: Good   In general, how would you rate your physical health? Very good   In general, how would you rate your mental health, including your mood and your ability to think? Fair   In general, how would you rate your satisfaction with your social activities and relationships? Good   In general, please rate how well you carry out your usual social activities and roles Fair   To what extent are you able to carry out your everyday physical activities such as walking, climbing stairs, carrying groceries, or moving a chair? Mostly   How often have you been bothered by emotional problems such as " feeling anxious, depressed or irritable? Sometimes   How would you rate your fatigue on average? Moderate   How would you rate your pain on average?   0 = No Pain  to  10 = Worst Imaginable Pain 7   Global Physical Health Score : Raw Score 13 (SUM : G03 - G06 - G07 - G08)   Global Mentall Health Score : Raw Score 11 (SUM : G02 - G04 - G05 - G10)   Total (Physical + Mental Health Score) 24   In general, would you say your health is: 3   In general, would you say your quality of life is: 3   In general, how would you rate your physical health? 4   In general, how would you rate your mental health, including your mood and your ability to think? 2   In general, how would you rate your satisfaction with your social activities and relationships? 3   In general, please rate how well you carry out your usual social activities and roles. (This includes activities at home, at work and in your community, and responsibilities as a parent, child, spouse, employee, friend, etc.) 2   To what extent are you able to carry out your everyday physical activities such as walking, climbing stairs, carrying groceries, or moving a chair? 4   In the past 7 days, how often have you been bothered by emotional problems such as feeling anxious, depressed, or irritable? 3   In the past 7 days, how would you rate your fatigue on average? 3   In the past 7 days, how would you rate your pain on average, where 0 means no pain, and 10 means worst imaginable pain? 7   Global Mental Health Score 11   Global Physical Health Score 13   PROMIS TOTAL - SUBSCORES 24   Pain question re-calculation - no clinical value 2   Some recent data might be hidden                Isha Palma LPN

## 2017-08-31 NOTE — MR AVS SNAPSHOT
After Visit Summary   8/31/2017    Jonathon Conde    MRN: [id number]           Patient Information     Date Of Birth          1990        Visit Information        Provider Department      8/31/2017 11:45 AM Patrick Xavier MD [facility]        Today's Diagnoses     Scoliosis of thoracic spine, unspecified scoliosis type    -  1       Follow-ups after your visit        Additional Services     PAC Visit Referral (For [facility] Only)       Does this visit require an Anesthesia consult?  Yes - Evaluate for medical necessity related to one of the following conditions:  Pharmacy Pain med consult preop.  Family HX. Addiction of both parents & patient wants to discuss strategies & alternatives to avoid getting addicted himself.      H&P done by:  PAC      Please be aware that coverage of these services is subject to the terms and limitations of your health insurance plan.  Call member services at your health plan with any benefit or coverage questions.      Please bring the following to your appointment:  >>   Any x-rays, CTs or MRIs which have been performed.  Contact the facility where they were done to arrange for  prior to your scheduled appointment.  Any new CT, MRI or other procedures ordered by your specialist must be performed at a [facility] facility or coordinated by your clinic's referral office.    >>   List of current medications  >>   This referral request   >>   Any documents/labs given to you for this referral                  Who to contact     Please call your clinic at [phone] to:    Ask questions about your health    Make or cancel appointments    Discuss your medicines    Learn about your test results    Speak to your doctor   If you have compliments or concerns about an experience at your clinic, or if you wish to file a complaint, please contact [facility] Patient Relations at [phone] or email us at  [email or web address]         Additional Information About Your Visit        [employer] Information     [employer] gives you secure access to your electronic health record. If you see a primary care provider, you can also send messages to your care team and make appointments. If you have questions, please call your primary care clinic.  If you do not have a primary care provider, please call [phone] and they will assist you.      [employer] is an electronic gateway that provides easy, online access to your medical records. With [employer], you can request a clinic appointment, read your test results, renew a prescription or communicate with your care team.     To access your existing account, please contact your [facility] or call [phone] for assistance.        Care EveryWhere ID     This is your Care EveryWhere ID. This could be used by other organizations to access your [facility] medical records  [id number]        Your Vitals Were     Height BMI (Body Mass Index)                1.9 m (6' 2.8\") 22.1 kg/m2           Blood Pressure from Last 3 Encounters:   No data found for BP    Weight from Last 3 Encounters:   No data found for Wt              Today, you had the following     No orders found for display       Primary Care Provider Office Phone # Fax #    Kyara Denise Vallejo [phone]       [address] [place] [address] [address] [place] [id number]        Equal Access to Services     ANALI MILLIGAN : Hadii lucero gomeso Soalba, waaxda luqadaha, qaybta kaalmada pepe, wyatt almonte. So [facility] [place] [phone].    ATENCIÓN: Si habla español, tiene a perrin disposición servicios gratuitos de asistencia lingüística. Nafisa al [phone].    We comply with applicable federal civil rights laws and [place] laws. We do not discriminate on the basis of race, color, national origin, age, disability sex, sexual orientation or gender " identity.            Thank you!     Thank you for choosing [facility]  for your care. Our goal is always to provide you with excellent care. Hearing back from our patients is one way we can continue to improve our services. Please take a few minutes to complete the written survey that you may receive in the mail after your visit with us. Thank you!             Your Updated Medication List - Protect others around you: Learn how to safely use, store and throw away your medicines at www.disposemymeds.org.      Notice  As of 8/31/2017 11:59 PM    You have not been prescribed any medications.

## 2017-08-31 NOTE — PROGRESS NOTES
REASON FOR VISIT: Discuss CT results and surgical options    REFERRING PROVIDER: Jaylin Thomson     PRIMARY CARE PHYSICIAN: Kyara Quiñones    HISTORY OF PRESENT ILLNESS: Jonathon Conde is a 26 year old male who returns to clinic today for a review of his recent CT myelogram.  He was previously seen on 6/29/17 for scoliosis, and he was sent for the CT myelogram because of concern about a potential diastematomyelia.  Since that visit, he has not noticed a dramatic change in his symptoms, but he has noted more pain in the upper thoracic region of his back on the left side because of a recent move.  He has been having to carry objects, which has aggravated his pain.  The pain is relieved with hot showers nightly.  However, he doesn't sleep well through the night because he is unable to find comfortable positions.      He notes that he has dramatically reduced his smoking, but he has still had about 10 cigarettes in the past two months.      SRS: 52% (previously 63%)  [id number]: 24 (previously 29)  Pain scale: 7 (previously 6-9)      IMAGING: A CT myelogram of the thoracic spine shows a levoconvex scoliosis centered at T4.  There is no spinal canal or neural foraminal stenosis.  There is no evidence of a diastematomyelia. See full radiologic report in chart.    CLINICAL ASSESSMENT:   Double major curve thoracic scoliosis    DISCUSSION/PLAN:   Jonathon is a 26 year old male who returned to clinic today to discuss the results of his recent CT myelogram.  We reviewed the images in detail today, and there was no evidence of a diastematomyelia.  We discussed surgical options regarding his thoracic scoliosis today.  This would involve a posterior spinal fusion from T1 or T2 to T12 with Camargo Vallejo osteotomies.  We reviewed the risks and benefits of the surgery in detail. The risks include those associated with anesthesia, including death, pulmonary embolism, DVT, stroke, myocardial infarction, pneumonia, blindness,  and urinary tract infection. Additional risks include the risk of blood loss, infection, nerve damage, failure to heal, hardware problems and failure of the intervention to improve her symptoms.  With regard to blood loss, we use a medication called tranexamic acid.  We are also able to return some blood via [employer].  To mitigate the risk of infection, we use antibiotics, both IV and in the wound.  With regard to nerve damage, we use intra-operative neuro-monitoring to help identify potential problems.  To assist in healing the fusion, we use bone graft.  To avoid hardware problems, we use an intra-operative CT, which is like GPS for the spine.  He understands the risks of the surgery and wishes to proceed.     Before the surgery, he will need to be nicotine-free for at least six weeks.  The rationale behind this timeframe was discussed.  His nicotine level will need to be confirmed with either urine or serum testing.  He and his girlfriend asked good questions, and they would like to speak with a financial counselor in order to avoid any surprise charges.  Finally, he will need supine AP and traction radiographs before leaving clinic today.    All questions and concerns were answered to the patient's apparent satisfaction before leaving the clinic.     Thank you for allowing Dr. Xavier and I to participate in the care of Jonathon Conde.    Respectfully,  Rosanne Cain PA-C    I saw and evaluated the patient on the day of the visit and I formulated the plan.  Patirck Xavier MD      CC  Copy to patient    [address] [place] [id number]

## 2017-08-31 NOTE — LETTER
8/31/2017       RE: Jonathon Conde  [address] [facility] [address]     Dear Colleague,    Thank you for referring your patient, Jonathon Conde, to the [facility] ORTHOP[facility]IC at [facility]. Please see a copy of my visit note below.    REASON FOR VISIT: Discuss CT results and surgical options    REFERRING PROVIDER: Jaylin Thomson     PRIMARY CARE PHYSICIAN: Kyara Quiñones    HISTORY OF PRESENT ILLNESS: Jonathon Conde is a 26 year old male who returns to clinic today for a review of his recent CT myelogram.  He was previously seen on 6/29/17 for scoliosis, and he was sent for the CT myelogram because of concern about a potential diastematomyelia.  Since that visit, he has not noticed a dramatic change in his symptoms, but he has noted more pain in the upper thoracic region of his back on the left side because of a recent move.  He has been having to carry objects, which has aggravated his pain.  The pain is relieved with hot showers nightly.  However, he doesn't sleep well through the night because he is unable to find comfortable positions.      He notes that he has dramatically reduced his smoking, but he has still had about 10 cigarettes in the past two months.      SRS: 52% (previously 63%)  Gpygez52: 24 (previously 29)  Pain scale: 7 (previously 6-9)      IMAGING: A CT myelogram of the thoracic spine shows a levoconvex scoliosis centered at T4.  There is no spinal canal or neural foraminal stenosis.  There is no evidence of a diastematomyelia. See full radiologic report in chart.    CLINICAL ASSESSMENT:   Double major curve thoracic scoliosis    DISCUSSION/PLAN:   Jonathon is a 26 year old male who returned to clinic today to discuss the results of his recent CT myelogram.  We reviewed the images in detail today, and there was no evidence of a diastematomyelia.  We discussed surgical options regarding his thoracic scoliosis today.  This would involve a  posterior spinal fusion from T1 or T2 to T12 with Camargo Vallejo osteotomies.  We reviewed the risks and benefits of the surgery in detail. The risks include those associated with anesthesia, including death, pulmonary embolism, DVT, stroke, myocardial infarction, pneumonia, blindness, and urinary tract infection. Additional risks include the risk of blood loss, infection, nerve damage, failure to heal, hardware problems and failure of the intervention to improve her symptoms.  With regard to blood loss, we use a medication called tranexamic acid.  We are also able to return some blood via Cell Saver.  To mitigate the risk of infection, we use antibiotics, both IV and in the wound.  With regard to nerve damage, we use intra-operative neuro-monitoring to help identify potential problems.  To assist in healing the fusion, we use bone graft.  To avoid hardware problems, we use an intra-operative CT, which is like GPS for the spine.  He understands the risks of the surgery and wishes to proceed.     Before the surgery, he will need to be nicotine-free for at least six weeks.  The rationale behind this timeframe was discussed.  His nicotine level will need to be confirmed with either urine or serum testing.  He and his girlfriend asked good questions, and they would like to speak with a financial counselor in order to avoid any surprise charges.  Finally, he will need supine AP and traction radiographs before leaving clinic today.    All questions and concerns were answered to the patient's apparent satisfaction before leaving the clinic.     Thank you for allowing Dr. Xavier and I to participate in the care of Jonathon Conde.    Respectfully,  Rosanne Cain PA-C    CC  Copy to patient    [address] [place] [address]      Again, thank you for allowing me to participate in the care of your patient.      Sincerely,    Patrick Xavier MD

## 2017-09-01 NOTE — NURSING NOTE
Teaching Flowsheet   Relevant Diagnosis: scoliosis  Teaching Topic: preop     Person(s) involved in teaching:   Patient and SO Keisha     Motivation Level:  Asks Questions: Yes  Eager to Learn: Yes  Cooperative: Yes  Receptive (willing/able to accept information): Yes  Any cultural factors/Congregation beliefs that may influence understanding or compliance? No       Patient and Caregiver demonstrates understanding of the following:  Reason for the appointment, diagnosis and treatment plan: Yes  Knowledge of proper use of medications and conditions for which they are ordered (with special attention to potential side effects or drug interactions): Yes  Which situations necessitate calling provider and whom to contact: Yes       Teaching Concerns Addressed:        Proper use and care of meds. & quit smoking (medical equip, care aids, etc.): Yes  Nutritional needs and diet plan: Yes  Pain management techniques: Yes  Wound Care: Yes  How and/when to access community resources: Yes     Instructional Materials Used/Given: preop pkt     Time spent with patient: 15 minutes.

## 2018-01-30 ENCOUNTER — TELEPHONE (OUTPATIENT)
Dept: ORTHOPEDICS | Facility: CLINIC | Age: 28
End: 2018-01-30

## 2018-01-30 NOTE — TELEPHONE ENCOUNTER
Jonathon called today requesting a blood test. He states that he has quit smoking and is ready for the blood test and would like to schedule surgery.   Message sent to Dr. Xavier's nurse.

## 2018-02-02 ENCOUNTER — TELEPHONE (OUTPATIENT)
Dept: INTERNAL MEDICINE | Facility: CLINIC | Age: 28
End: 2018-02-02

## 2018-02-02 DIAGNOSIS — Z71.6 ENCOUNTER FOR TOBACCO USE CESSATION COUNSELING: Primary | ICD-10-CM

## 2018-02-02 NOTE — TELEPHONE ENCOUNTER
----- Message from Precious Gannon sent at 2/1/2018  9:01 AM CST -----  Regarding: Lab  Contact: [phone]  Patient looking for an order for blood work proving no nicotine in his system before surgery.  They won't schedule until blood work is dompleted.    Please call him

## 2018-02-13 DIAGNOSIS — Z71.6 ENCOUNTER FOR TOBACCO USE CESSATION COUNSELING: ICD-10-CM

## 2018-02-15 LAB — COTININE UR QL: NEGATIVE NG/ML

## 2018-02-23 ENCOUNTER — TELEPHONE (OUTPATIENT)
Dept: ORTHOPEDICS | Facility: CLINIC | Age: 28
End: 2018-02-23

## 2018-02-23 NOTE — TELEPHONE ENCOUNTER
Call patient to let him know we received approval from his insurance to schedule surgery. Jonathon has been scheduled for surgery with Dr. Xavier on 6/12/18 at [place]. He will be placed on Dr. Xavier's wait list per his request. He knows we will contact him as we get closer to the surgery date to set up his H&P with the PAC.

## 2018-02-26 ENCOUNTER — HOSPITAL ENCOUNTER (INPATIENT)
Facility: CLINIC | Age: 28
Setting detail: SURGERY ADMIT
End: 2018-02-26
Attending: ORTHOPAEDIC SURGERY | Admitting: ORTHOPAEDIC SURGERY

## 2018-03-27 ENCOUNTER — TELEPHONE (OUTPATIENT)
Dept: ORTHOPEDICS | Facility: CLINIC | Age: 28
End: 2018-03-27

## 2018-03-27 NOTE — TELEPHONE ENCOUNTER
Upon scheduling his surgery with Dr. Xavier, Jonathon requested to be placed on Dr. Xavier's wait list. Due to a cancellation, I called Jonathon today to offer him a surgery date of 4/16/18. He stated he would like to keep his currently scheduled date of 6/12/18 so he has a chance to save money before he has surgery.

## 2018-05-21 ENCOUNTER — ANESTHESIA EVENT (OUTPATIENT)
Dept: SURGERY | Facility: CLINIC | Age: 28
End: 2018-05-21

## 2018-05-21 ENCOUNTER — OFFICE VISIT (OUTPATIENT)
Dept: SURGERY | Facility: CLINIC | Age: 28
End: 2018-05-21
Payer: COMMERCIAL

## 2018-05-21 ENCOUNTER — ALLIED HEALTH/NURSE VISIT (OUTPATIENT)
Dept: SURGERY | Facility: CLINIC | Age: 28
End: 2018-05-21
Payer: COMMERCIAL

## 2018-05-21 ENCOUNTER — APPOINTMENT (OUTPATIENT)
Dept: SURGERY | Facility: CLINIC | Age: 28
End: 2018-05-21
Payer: COMMERCIAL

## 2018-05-21 VITALS
OXYGEN SATURATION: 98 % | DIASTOLIC BLOOD PRESSURE: 81 MMHG | RESPIRATION RATE: 16 BRPM | WEIGHT: 186.1 LBS | HEART RATE: 66 BPM | TEMPERATURE: 97.9 F | BODY MASS INDEX: 23.14 KG/M2 | HEIGHT: 75 IN | SYSTOLIC BLOOD PRESSURE: 116 MMHG

## 2018-05-21 DIAGNOSIS — M41.24 OTHER IDIOPATHIC SCOLIOSIS, THORACIC REGION: ICD-10-CM

## 2018-05-21 DIAGNOSIS — Z01.818 PREOP EXAMINATION: Primary | ICD-10-CM

## 2018-05-21 LAB
ALBUMIN UR-MCNC: NEGATIVE MG/DL
ANION GAP SERPL CALCULATED.3IONS-SCNC: 6 MMOL/L (ref 3–14)
APPEARANCE UR: CLEAR
BASOPHILS # BLD AUTO: 0.1 10E9/L (ref 0–0.2)
BASOPHILS NFR BLD AUTO: 0.6 %
BILIRUB UR QL STRIP: NEGATIVE
BUN SERPL-MCNC: 10 MG/DL (ref 7–30)
CALCIUM SERPL-MCNC: 9.4 MG/DL (ref 8.5–10.1)
CHLORIDE SERPL-SCNC: 105 MMOL/L (ref 94–109)
CO2 SERPL-SCNC: 27 MMOL/L (ref 20–32)
COLOR UR AUTO: YELLOW
CREAT SERPL-MCNC: 1.02 MG/DL (ref 0.66–1.25)
DIFFERENTIAL METHOD BLD: NORMAL
EOSINOPHIL # BLD AUTO: 0.2 10E9/L (ref 0–0.7)
EOSINOPHIL NFR BLD AUTO: 2.2 %
ERYTHROCYTE [DISTWIDTH] IN BLOOD BY AUTOMATED COUNT: 11.9 % (ref 10–15)
GFR SERPL CREATININE-BSD FRML MDRD: 87 ML/MIN/1.7M2
GLUCOSE SERPL-MCNC: 87 MG/DL (ref 70–99)
GLUCOSE UR STRIP-MCNC: NEGATIVE MG/DL
HCT VFR BLD AUTO: 46.4 % (ref 40–53)
HGB BLD-MCNC: 15.7 G/DL (ref 13.3–17.7)
HGB UR QL STRIP: NEGATIVE
IMM GRANULOCYTES # BLD: 0 10E9/L (ref 0–0.4)
IMM GRANULOCYTES NFR BLD: 0.3 %
INR PPP: 1.01 (ref 0.86–1.14)
KETONES UR STRIP-MCNC: NEGATIVE MG/DL
LEUKOCYTE ESTERASE UR QL STRIP: NEGATIVE
LYMPHOCYTES # BLD AUTO: 2.1 10E9/L (ref 0.8–5.3)
LYMPHOCYTES NFR BLD AUTO: 23 %
MCH RBC QN AUTO: 31.2 PG (ref 26.5–33)
MCHC RBC AUTO-ENTMCNC: 33.8 G/DL (ref 31.5–36.5)
MCV RBC AUTO: 92 FL (ref 78–100)
MONOCYTES # BLD AUTO: 0.6 10E9/L (ref 0–1.3)
MONOCYTES NFR BLD AUTO: 6.6 %
MRSA DNA SPEC QL NAA+PROBE: NEGATIVE
NEUTROPHILS # BLD AUTO: 6 10E9/L (ref 1.6–8.3)
NEUTROPHILS NFR BLD AUTO: 67.3 %
NITRATE UR QL: NEGATIVE
NRBC # BLD AUTO: 0 10*3/UL
NRBC BLD AUTO-RTO: 0 /100
PH UR STRIP: 6 PH (ref 5–7)
PLATELET # BLD AUTO: 254 10E9/L (ref 150–450)
POTASSIUM SERPL-SCNC: 4.2 MMOL/L (ref 3.4–5.3)
RBC # BLD AUTO: 5.03 10E12/L (ref 4.4–5.9)
SODIUM SERPL-SCNC: 138 MMOL/L (ref 133–144)
SOURCE: NORMAL
SP GR UR STRIP: 1.02 (ref 1–1.03)
SPECIMEN SOURCE: NORMAL
UROBILINOGEN UR STRIP-MCNC: 0 MG/DL (ref 0–2)
WBC # BLD AUTO: 8.9 10E9/L (ref 4–11)

## 2018-05-21 ASSESSMENT — LIFESTYLE VARIABLES: TOBACCO_USE: 1

## 2018-05-21 NOTE — MR AVS SNAPSHOT
After Visit Summary   2018    Jonathon Conde    MRN: [id number]           Patient Information     Date Of Birth          1990        Visit Information        Provider Department      2018 9:30 AM Rn, [facility]        Care Instructions    Preparing for Your Surgery      Name:  oJnathon Conde   MRN:  [id number]   :  1990   Today's Date:  2018     Arriving for surgery:  Spine Surgery   Surgery date:  2018  Arrival time:  6:00 am  Please come to:     [facility] [address]  [address] [place], [place] [address]. [place]  [address]    - parking is available in front of [facility] from 5:15AM to 8:00PM. If you prefer, park your car in the Green Lot.    -Proceed to the 3rd floor, check in at the Adult Surgery Waiting Lounge. [phone]    If an escort is needed stop at the Information Desk in the lobby. Inform the information person that you are here for surgery. An escort to the Adult Surgery Waiting Lounge will be provided.        What can I eat or drink?  -  You may have solid food or milk products until 8 hours prior to your surgery. Nothing after Midnight   -  You may have water, apple juice or 7up/Sprite until 2 hours prior to your surgery. Until 6:00 am arrival time     Which medicines can I take?        Please hold aspirin, vitamins and supplements for 1 week before surgery      Hold ibuprofen / Naproxen for 24 hours before surgery         -  Please take these medications the day of surgery:    NONE      How do I prepare myself?  -  Take two showers: one the night before surgery; and one the morning of surgery.         Use Scrubcare or Hibiclens to wash from neck down.  You may use your own     shampoo and conditioner. No other hair products.   -  Do NOT use lotion, powder, deodorant, or antiperspirant the day of your surgery.  -  Do NOT wear any makeup, fingernail polish or jewelry.  -  Begin using  Incentive Spirometer 1 week prior to surgery.  Use 4 times per day, up    to 5 breaths each time.  Bring Incentive Spirometer to hospital.  - Do not bring your own medications to the hospital, except for inhalers and eye   drops.  -  Bring your ID and insurance card.    Questions or Concerns:  If you have questions or concerns regarding the day of surgery, please call:  [phone]     After surgery please call your surgeons office.            AFTER YOUR SURGERY  Breathing exercises   Breathing exercises help you recover faster. Take deep breaths and let the air out slowly. This will:     Help you wake up after surgery.    Help prevent complications like pneumonia.  Preventing complications will help you go home sooner.   We may give you a breathing device (incentive spirometer) to encourage you to breathe deeply.   Nausea and vomiting   You may feel sick to your stomach after surgery; if so, let your nurse know.    Pain control:  After surgery, you may have pain. Our goal is to help you manage your pain. Pain medicine will help you feel comfortable enough to do activities that will help you heal.  These activities may include breathing exercises, walking and physical therapy.   To help your health care team treat your pain we will ask: 1) If you have pain  2) where it is located 3) describe your pain in your words  Methods of pain control include medications given by mouth, vein or by nerve block for some surgeries.  We may give you a pain control pump that will:  1) Deliver the medicine through a tube placed in your vein  2) Control the amount of medicine you receive  3) Allow you to push a button to deliver a dose of pain medicine  Sequential Compression Device (SCD) or Pneumo Boots:  You may need to wear SCD S on your legs or feet. These are wraps connected to a machine that pumps in air and releases it. The repeated pumping helps prevent blood clots from forming.     Using an Incentive Spirometer    An  incentive spirometer is a device that helps you do deep breathing exercises. These exercises expand your lungs, aid in circulation, and help prevent pneumonia. Deep breathing exercises also help you breathe better and improve the function of your lungs by:    Keeping your lungs clear    Strengthening your breathing muscles    Helping prevent respiratory complications or problems  The incentive spirometer gives you a way to take an active part in your care. A nurse or therapist will teach you breathing exercises. To do these exercises, you will breathe in through your mouth and not your nose. The incentive spirometer only works correctly if you breathe in through your mouth.    Steps to clear lungs  Step 1. Exhale normally. Then, inhale normally.    Relax and breathe out.  Step 2. Place your lips tightly around the mouthpiece.    Make sure the device is upright and not tilted.  Step 3. Inhale as much air as you can through the mouthpiece (don't breath through your nose).    Inhale slowly and deeply.    Hold your breath long enough to keep the balls or disk raised for at least 3 to 5 seconds, or as instructed by your healthcare provider.  Step 4. Repeat the exercise regularly.  Begin using the Incentive Spirometer one week prior to your surgery, 4 times per day-5 times each.          Follow-ups after your visit        Your next 10 appointments already scheduled     May 21, 2018  9:30 AM CDT   (Arrive by 9:15 AM)   PAC RN ASSESSMENT with Carmencita Pac Rn   [facility] Preoperative [facility] [place] ([facility])    [address] [place] [address]   [phone]            May 21, 2018 10:00 AM CDT   (Arrive by 9:45 AM)   PAC Anesthesia Consult with Carmencita Pac Anesthesiologist   [facility] Preoperative [facility] [place] ([facility])    [address] [place] [address]   [phone]            Jun 12, 2018   Procedure with Patrick Davila  MD Morenita   [facility] [place], Same Day Surgery (--)    [address]  [place] [address] [phone]            Jul 25, 2018  8:45 AM CDT   (Arrive by 8:15 AM)   RETURN SCOLIOSIS with Patrick Xavier MD   [facility] ([facility])    [address] [facility]  4th Floor  [facility] [address] [phone]            Aug 29, 2018  8:45 AM CDT   (Arrive by 8:15 AM)   RETURN SCOLIOSIS with Patrick Xavier MD   [facility] ([facility])    [address] [facility] [address]  4th Floor  [facility] [address] [phone]              Future tests that were ordered for you today     Open Future Orders        Priority Expected Expires Ordered    CBC with platelets differential Routine 5/21/2018 6/20/2018 5/21/2018    Basic metabolic panel Routine 5/21/2018 6/20/2018 5/21/2018    INR Routine 5/21/2018 6/20/2018 5/21/2018    UA reflex to Microscopic and Culture Routine 5/21/2018 6/20/2018 5/21/2018    ABO/Rh type and screen Routine 5/21/2018 6/20/2018 5/21/2018            Who to contact     Please call your clinic at [phone] to:    Ask questions about your health    Make or cancel appointments    Discuss your medicines    Learn about your test results    Speak to your doctor            Additional Information About Your Visit        iPourithart Information     Anvato gives you secure access to your electronic health record. If you see a primary care provider, you can also send messages to your care team and make appointments. If you have questions, please call your primary care clinic.  If you do not have a primary care provider, please call [phone] and they will assist you.      Anvato is an electronic gateway that provides easy, online access to your medical records. With Anvato, you can request a clinic appointment, read your test results, renew a prescription or communicate with your care team.     To access your existing  account, please contact your [facility] or call [phone] for assistance.        Care EveryWhere ID     This is your Care EveryWhere ID. This could be used by other organizations to access your [facility] medical records  [id number]         Blood Pressure from Last 3 Encounters:   05/21/18 116/81   07/17/17 118/82   07/13/17 126/73    Weight from Last 3 Encounters:   05/21/18 84.4 kg (186 lb 1.6 oz)   08/31/17 79.8 kg (175 lb 14.4 oz)   07/17/17 76.2 kg (168 lb)              Today, you had the following     No orders found for display       Primary Care Provider Office Phone # Fax #    Kyara Denise Vallejo [phone]       [address] [place] [address] [address] [place] [address]        Equal Access to Services     [facility] : Hadii aad ku hadasho Soalba, waaxda luqadaha, qaybta kaalmada ademariselayada, wyatt arita . So [facility] [phone].    ATENCIÓN: Si habla español, tiene a perrin disposición servicios gratuitos de asistencia lingüística. Nafisa al [phone].    We comply with applicable federal civil rights laws and [place] laws. We do not discriminate on the basis of race, color, national origin, age, disability, sex, sexual orientation, or gender identity.            Thank you!     Thank you for choosing [facility]  for your care. Our goal is always to provide you with excellent care. Hearing back from our patients is one way we can continue to improve our services. Please take a few minutes to complete the written survey that you may receive in the mail after your visit with us. Thank you!             Your Updated Medication List - Protect others around you: Learn how to safely use, store and throw away your medicines at www.disposemymeds.org.      Notice  As of 5/21/2018  9:14 AM    You have not been prescribed any medications.

## 2018-05-21 NOTE — PATIENT INSTRUCTIONS
Preparing for Your Surgery      Name:  Jonathon Conde   MRN:  [id number]   :  1990   Today's Date:  2018     Arriving for surgery:  Spine Surgery   Surgery date:  2018  Arrival time:  6:00 am  Please come to:     [facility] [address]  [address] [place]  [address]    - parking is available in front of [facility] from 5:15AM to 8:00PM. If you prefer, park your car in the Green Lot.    -Proceed to the 3rd floor, check in at the Adult Surgery Waiting Lounge. [phone]    If an escort is needed stop at the Information Desk in the lobby. Inform the information person that you are here for surgery. An escort to the Adult Surgery Waiting Lounge will be provided.        What can I eat or drink?  -  You may have solid food or milk products until 8 hours prior to your surgery. Nothing after Midnight   -  You may have water, apple juice or 7up/Sprite until 2 hours prior to your surgery. Until 6:00 am arrival time     Which medicines can I take?        Please hold aspirin, vitamins and supplements for 1 week before surgery      Hold ibuprofen / Naproxen for 24 hours before surgery         -  Please take these medications the day of surgery:    NONE      How do I prepare myself?  -  Take two showers: one the night before surgery; and one the morning of surgery.         Use Scrubcare or Hibiclens to wash from neck down.  You may use your own     shampoo and conditioner. No other hair products.   -  Do NOT use lotion, powder, deodorant, or antiperspirant the day of your surgery.  -  Do NOT wear any makeup, fingernail polish or jewelry.  -  Begin using Incentive Spirometer 1 week prior to surgery.  Use 4 times per day, up    to 5 breaths each time.  Bring Incentive Spirometer to hospital.  - Do not bring your own medications to the hospital, except for inhalers and eye   drops.  -  Bring your ID and insurance card.    Questions or Concerns:  If you  have questions or concerns regarding the day of surgery, please call:  [phone]     After surgery please call your surgeons office.            AFTER YOUR SURGERY  Breathing exercises   Breathing exercises help you recover faster. Take deep breaths and let the air out slowly. This will:     Help you wake up after surgery.    Help prevent complications like pneumonia.  Preventing complications will help you go home sooner.   We may give you a breathing device (incentive spirometer) to encourage you to breathe deeply.   Nausea and vomiting   You may feel sick to your stomach after surgery; if so, let your nurse know.    Pain control:  After surgery, you may have pain. Our goal is to help you manage your pain. Pain medicine will help you feel comfortable enough to do activities that will help you heal.  These activities may include breathing exercises, walking and physical therapy.   To help your health care team treat your pain we will ask: 1) If you have pain  2) where it is located 3) describe your pain in your words  Methods of pain control include medications given by mouth, vein or by nerve block for some surgeries.  We may give you a pain control pump that will:  1) Deliver the medicine through a tube placed in your vein  2) Control the amount of medicine you receive  3) Allow you to push a button to deliver a dose of pain medicine  Sequential Compression Device (SCD) or Pneumo Boots:  You may need to wear SCD S on your legs or feet. These are wraps connected to a machine that pumps in air and releases it. The repeated pumping helps prevent blood clots from forming.     Using an Incentive Spirometer    An incentive spirometer is a device that helps you do deep breathing exercises. These exercises expand your lungs, aid in circulation, and help prevent pneumonia. Deep breathing exercises also help you breathe better and improve the function of your lungs by:    Keeping your lungs clear    Strengthening your  breathing muscles    Helping prevent respiratory complications or problems  The incentive spirometer gives you a way to take an active part in your care. A nurse or therapist will teach you breathing exercises. To do these exercises, you will breathe in through your mouth and not your nose. The incentive spirometer only works correctly if you breathe in through your mouth.    Steps to clear lungs  Step 1. Exhale normally. Then, inhale normally.    Relax and breathe out.  Step 2. Place your lips tightly around the mouthpiece.    Make sure the device is upright and not tilted.  Step 3. Inhale as much air as you can through the mouthpiece (don't breath through your nose).    Inhale slowly and deeply.    Hold your breath long enough to keep the balls or disk raised for at least 3 to 5 seconds, or as instructed by your healthcare provider.  Step 4. Repeat the exercise regularly.  Begin using the Incentive Spirometer one week prior to your surgery, 4 times per day-5 times each.

## 2018-05-21 NOTE — ANESTHESIA PREPROCEDURE EVALUATION
Anesthesia Evaluation     . Pt has had prior anesthetic. Type: General    No history of anesthetic complications          ROS/MED HX    ENT/Pulmonary:     (+)tobacco use, Past use , . .    Neurologic:  - neg neurologic ROS     Cardiovascular:  - neg cardiovascular ROS       METS/Exercise Tolerance:  >4 METS   Hematologic:  - neg hematologic  ROS       Musculoskeletal:   (+) , , other musculoskeletal- Chronic back pain due to scoliosis      GI/Hepatic:  - neg GI/hepatic ROS       Renal/Genitourinary:  - ROS Renal section negative       Endo:  - neg endo ROS       Psychiatric:         Infectious Disease:  - neg infectious disease ROS       Malignancy:      - no malignancy   Other:    (+) H/O Chronic Pain,                   Physical Exam  Normal systems: cardiovascular and pulmonary    Airway     Dental   Comment: Small retainer bar behind 2 front teeth    Cardiovascular   Rhythm and rate: regular and normal      Pulmonary    breath sounds clear to auscultation               PAC Discussion and Assessment    ASA Classification: 1  Case is suitable for: [facility]  Anesthetic techniques and relevant risks discussed: GA  Invasive monitoring and risk discussed:   Types:   Possibility and Risk of blood transfusion discussed:   NPO instructions given:   Additional anesthetic preparation and risks discussed:   Needs early admission to pre-op area:   Other:     PAC Resident/NP Anesthesia Assessment:  Jonathon Conde is a 27 year old male who presents for pre-operative H & P in preparation for a Stealth Assisted Posterior Spinal Fusion Thoracic 1-2/Thoracic 12, Camargo Vallejo Osteotomies Thoracic 2-3-Thoracic 10 on 6/12/18 with Dr. Xavier for scoliosis.   PAC referral for risk assessment and optimization for anesthesia with comorbid conditions of:    Pre-operative considerations:  1.  Cardiac:  Functional status METS>4    Risk of Major Adverse Cardiac event: 0.4%  -No known cardiac disease  2.  Pulm:   MARY risk:  low  -3 pack  year smoking history, quit 2/2018; No known pulmonary disease    3.  GI:  Risk of PONV score = 2 .  If > 2, anti-emetic intervention recommended.    Patient is optimized and is an acceptable candidate for the proposed procedure.  No further diagnostic evaluation is needed.    Ignacia Marie MS, PA-C  05/21/18 10:11 AM        Mid-Level Provider/Resident:   Date:   Time:     Attending Anesthesiologist Anesthesia Assessment:        Anesthesiologist:   Date:   Time:   Pass/Fail:   Disposition:     PAC Pharmacist Assessment:        Pharmacist:   Date:   Time:                           .

## 2018-05-22 ENCOUNTER — TELEPHONE (OUTPATIENT)
Dept: INTERNAL MEDICINE | Facility: CLINIC | Age: 28
End: 2018-05-22

## 2018-05-22 DIAGNOSIS — E55.9 VITAMIN D DEFICIENCY: Primary | ICD-10-CM

## 2018-05-22 LAB
ABO + RH BLD: NORMAL
ABO + RH BLD: NORMAL
BLD GP AB SCN SERPL QL: NORMAL
BLOOD BANK CMNT PATIENT-IMP: NORMAL
BLOOD BANK CMNT PATIENT-IMP: NORMAL
DEPRECATED CALCIDIOL+CALCIFEROL SERPL-MC: 16 UG/L (ref 20–75)
SPECIMEN EXP DATE BLD: NORMAL

## 2018-05-22 NOTE — PROGRESS NOTES
Notified by PAC clinic that preop H&P Vit D level slightly low.  I called pt. & asked him to F/U with [facility] here at [facility]  about supplement to be started immediately before spine surgery 6-12-18.  Pt. agreed & he will call PCC.  Cathleen Talamantes RN with

## 2018-05-22 NOTE — TELEPHONE ENCOUNTER
[facility] Call Center    Phone Message    May a detailed message be left on voicemail: yes    Reason for Call: Other: The pt's surgeon Dr Xavier states per lab tests, the pt is low on Vit D and needs Dr Hammond to order it asap. The pt needs to take it asap to prepare for surgery. The pt goes to [facility]. Please call the pt when this has been done. Thanks!     Action Taken: Message routed to:  [facility] ([facility]): [facility] med

## 2018-06-05 RX ORDER — CEFAZOLIN SODIUM 1 G/3ML
1 INJECTION, POWDER, FOR SOLUTION INTRAMUSCULAR; INTRAVENOUS SEE ADMIN INSTRUCTIONS
Status: CANCELLED | OUTPATIENT
Start: 2018-06-05

## 2018-06-05 RX ORDER — CEFAZOLIN SODIUM 2 G/100ML
2 INJECTION, SOLUTION INTRAVENOUS
Status: CANCELLED | OUTPATIENT
Start: 2018-06-05

## 2018-06-12 ENCOUNTER — ANESTHESIA (OUTPATIENT)
Dept: SURGERY | Facility: CLINIC | Age: 28
End: 2018-06-12

## 2018-08-27 DIAGNOSIS — Z98.1 S/P SPINAL FUSION: Primary | ICD-10-CM

## 2018-09-28 ENCOUNTER — TELEPHONE (OUTPATIENT)
Dept: ORTHOPEDICS | Facility: CLINIC | Age: 28
End: 2018-09-28

## 2018-09-28 DIAGNOSIS — M41.20 OTHER IDIOPATHIC SCOLIOSIS, UNSPECIFIED SPINAL REGION: Primary | ICD-10-CM

## 2018-09-28 NOTE — TELEPHONE ENCOUNTER
Health Call Center    Phone Message    May a detailed message be left on voicemail: no    Reason for Call: Other: Pt called in to speak with someone about his surgery on 10/15 with Dr. Xavier. Pt wants to know if there is any blood work or lab he needs to complete prior to it or any other workup needed so he can complete the surgery. He also wants to know how long after surgery he can travel; he was invited to a [place] outside the country in [place]. Please contact Pt.     Action Taken: Message routed to:  [facility] ([facility]): [facility] ORTHOPEDICS [facility]     I called pt. & reviewed all preop tchg. & answered all questions.  Cathleen Talamantes RN.

## 2018-10-04 ENCOUNTER — ANESTHESIA EVENT (OUTPATIENT)
Dept: SURGERY | Facility: CLINIC | Age: 28
DRG: 458 | End: 2018-10-04
Payer: COMMERCIAL

## 2018-10-04 ENCOUNTER — TELEPHONE (OUTPATIENT)
Dept: PHARMACY | Facility: CLINIC | Age: 28
End: 2018-10-04

## 2018-10-04 NOTE — ANESTHESIA PREPROCEDURE EVALUATION
Anesthesia Evaluation     . Pt has had prior anesthetic. Type: General    No history of anesthetic complications          ROS/MED HX    ENT/Pulmonary:     (+)tobacco use, Past use 0.25 packs/day  , . Other pulmonary disease smokes marijuana and/or marijuana oil vaping cartridge daily for pain management.    Neurologic:  - neg neurologic ROS     Cardiovascular:  - neg cardiovascular ROS   (+) ----. : . . . :. . No previous cardiac testing       METS/Exercise Tolerance:  >4 METS   Hematologic:  - neg hematologic  ROS       Musculoskeletal:   (+) , , other musculoskeletal- thoracic spine scoliosis with chronic back pain      GI/Hepatic:  - neg GI/hepatic ROS       Renal/Genitourinary:  - ROS Renal section negative       Endo:  - neg endo ROS       Psychiatric:  - neg psychiatric ROS       Infectious Disease:  - neg infectious disease ROS       Malignancy:      - no malignancy   Other:    (+) H/O Chronic Pain,                   Physical Exam  Normal systems: cardiovascular and pulmonary    Airway   Mallampati: II  TM distance: >3 FB  Neck ROM: full    Dental   (+) upper retainer    Cardiovascular   Rhythm and rate: regular and normal      Pulmonary    breath sounds clear to auscultation               PAC Discussion and Assessment    ASA Classification: 2  [place] [facility] [place]  Anesthetic techniques and relevant risks discussed: GA  Invasive monitoring and risk discussed:   Types:   Possibility and Risk of blood transfusion discussed:   NPO instructions given:   Additional anesthetic preparation and risks discussed:   Needs early admission to pre-op area:   Other:     PAC Resident/NP Anesthesia Assessment:  Jonathon Conde is a 27 year old male scheduled for a Posterior Spinal Fusion Thoracic 1/2- Thoracic 12, [place] Osteotomies Thoracic 2/3- Thoracic 10 on 10/15/2018 by Dr. Xavier in treatment of scoliosis and chronic back pain.  PAC referral for risk assessment and optimization for anesthesia with  comorbid conditions of: history of smoking and marijuana use.     Pre-operative considerations:  1.  Cardiac:  Functional status- METS >4. He long boards and jogs regularly for exercise.  He has no known cardiac conditions.  High risk surgery with 0.4% risk of major adverse cardiac event.   2.  Pulm:  Airway feasible.  MARY risk: low.  He no long smokes cigarettes.  He quit in Feb 2018.  He smokes or vapes marijuana daily for pain control.    3.  GI:  Risk of PONV score = 2.  If > 2, anti-emetic intervention recommended.  4. Pain management: He is very concerned for risk of abuse/dependence with any narcotic that may be used post-op due to family history.  Please see the PharmD note with pain management recommendations.      VTE risk: 0.5%    Celebrex ordered deferred from ERAS meds due to sulfa allergy.     Patient is optimized and is acceptable candidate for the proposed procedure.  No further diagnostic evaluation is needed.     Patient discussed with Dr. Garcia.       **For further details of assessment, testing, and physical exam please see H and P completed on same date.          Penny Rodriguez DNP, RN, APRN      Reviewed and Signed by PAC Mid-Level Provider/Resident  Mid-Level Provider/Resident: Penny Rodriguez DNP, RN, APRN  Date: 10/5/2018  Time: 1844    Attending Anesthesiologist Anesthesia Assessment:        Anesthesiologist:   Date:   Time:   Pass/Fail:   Disposition:     PAC Pharmacist Assessment:  PREOPERATIVE PAIN CONSULT FOR POSTOPERATIVE PAIN MANAGEMENT  Jonathon Conde was interviewed via phone call on October 4, 2018 prior to PAC Clinic appointment on 10/5/18 (patietn not seen in person due to scheduling conflict) in preparation for the planned procedure with Dr. Xavier on 10/15/18 at the [facility] for Posterior Spinal Fusion Thoracic 1/2- Thoracic 12, [place] Osteotomies Thoracic 2/3- Thoracic 10.    These recommendations are intended for patients admitted to the  hospital after a procedure and are only valid for 30 days from the date of service. If there are significant changes in opioid dosing between today and day of procedure the below recommendations may have to be adjusted. Based on [place] Prescription Monitoring Profile and patient interview:     - OUTPATIENT MEDICATIONS (related to pain management):  -- Long-acting opioid: none  -- Short-acting opioid: none  -- Oral adjuvant(s): none  -- Topicals: none  -- Bowel regimen: none   -- Other relevant medications: uses marijuana PRN for pain control. Has contemplated using     ASSESSMENT:    Jonathon Conde has a history of thoracic back pain and scoliosis who is scheduled for above procedure with Dr. Xavier.  Patient had questions about opioid use after surgery and was concerned about addiction issues related to opioids and wanted to talk through postoperative pain management plan.  He is not currently on opioids and does not have a personal history of opioid misuse, but both his parents have issues with substance misuse.  He currently endorses pain in his midline thoracic spine and wrapping around his chest.  He endorses intermittent sharp pain going up his back to his neck, but does not have radicular pain down his legs.  He does have numbness and tingling at times in his fingers and down his BLE (stops above the knee) when he bends forward to pick things up.  He is not currently taking any medicaitons for his pain but does smoke marijuana 1-2x/day to help with his pain and appetite.  He has stopped smoking cigarettes and only occasionally drinks alcohol and when he drinks will have 1-2 beers.  He has had 2 previous surgeries (collarbone fracture, wisdom teeth) when he received vicodin, and did well with that and was able to stop without any issues.  He is open to a multimodal pain management plan that minimizes opioid use and maximizes adjuvant therapy but is aware that he will likely need opiods to control his  postoperative pain.  He does endorse a good support system in his therapist who he sees every 2 weeks and his significant other (Keisha) who will be with him in the postoperative period.  All of patient's questions regarding pain management after surgery were answered and he was appreciative of the call.      Outpatient opioid oral morphine equivalent (OME): 0 mg OME/day     Other pain therapies tried in the past (not an all inclusive list):   vicodin - helpful for minor surgeries in past  Acetaminophen - tolerates, does not work that well for his back pain so he does not currently use.   Ibuprofen  - tolerates, does not work that well for his back pain so he does not currently use.     Pain therapies never tried (not an all inclusive list):   PCA - patient has not used a PCA previously but is open to using this for postoperative pain management  Ketamine - denies seizures, uncontrolled hypertension, cardiac arrhythmias, PTSD, BPAD, schizophrenia, psychiatric disorders (eg. hallucinations/delirium), history of brain cancer, cardiac failure, previous CVA and increased IOP/glaucoma and is open to using ketamine for pain control.     RECOMMENDATIONS:   The following pain management recommendations are made based on information from today's visit and should not replace medical decision-making based on patient condition at the time of procedure or postoperatively.      - PREOPERATIVE:  --  Before surgery recommend gabapentin 300 mg PO x 1 dose in pre-op area (Written in pre-op by PAC YELENA)   -- Before procedure recommend acetaminophen 975 mg PO in pre-op (Written in pre-op by PAC YELENA)  -- Before procedure consider celecoxib 200 mg PO in pre-op     - INTRAOPERATIVE (Anesthesiologist/CRNA to consider):  -- Ketamine IV intraoperatively  -- Avoid remifentanil - to reduce risk of developing hyperalgesia      - POSTOPERATIVE MANAGEMENT:  Place inpatient pain management consult to assist with acute postoperative pain management.     Opioid analgesic:  + Note: if neuraxial opioid or other long acting opioid is given during procedure contact on-call pain provider for adjustment in opioid plan  + Start HYDROmorphone (Dilaudid) PCA dose range 0.2-0.3 mg Q 10 min lockout interval with NO continuous rate.  Start with 0.2mg PCA dose and increase dose by 0.1 mg every 1 hour as necessary for pain control or improvement in physical function, hold or reduce dose for sedation. Notify provider to assess for uncontrolled pain or sedation and adjust dose as necessary.          -- Hold other PO and IV opioids while on PCA.   Nonopioid analgesics:   + Start acetaminophen 975 mg PO every 6 hr scheduled if no concern about masking fevers  + Start gabapentin 300 mg PO every 8 hours scheduled   + Start lidocaine 4% patches 3 patch daily (12 hr on 12 hr off) applied to intact skin only  + Cold therapy PRN     Muscle Relaxant:   + methocarbamol 500 mg PO Q6 hr PRN muscle spasm     Stool softeners/Laxatives:   + When appropriate start senna-docusate 1-2 tabs PO BID and Miralax 17 g daily to prevent opioid induced constipation.     Other:  + Recommend close monitoring of respiratory status postoperatively with capnography and continuous SpO2 monitoring.      + Ketamine IV infusion.  If needed for acute postop uncontrolled pain, the primary service may decide to start ketamine infusion at 5 mg/hr.  Ketamine for acute pain management will be used as an analgesic medication in addition to opioids when usual analgesics are suboptimal.Only start ketamine IV if patient is stable from a cardiovascular standpoint.    Low dose ketamine may be administered on a regular nursing unit according to [facility] ketamine-low dose policy.  Please see: [email or web address]     Absolute Contraindications (do not start ketamine):   Intracranial hypertension  Uncontrolled hypertension  Known or suspected schizophrenia  (even if currently stable or controlled with medications)  Relative contraindications:   Cardiac failure  Previous cerebrovascular accidents  Severe psychiatric disorders associated with hallucinations or delirium  Monitoring while on IV ketamine:  Vital signs 15 min after first dose and after each dose increase  Reassess minimally every 4 hours while patient is receiving stable (unchanging) dose of ketamine  Common adverse effects of ketamine:  Psychotomimetic effects (hallucinations or dreamlike feelings), dysphoria, anxiousness, restlessness, cognitive impairment and mild sedation are most common adverse effect at these low doses, but even this adverse event doesn't happen all the time.  The psychomimetic effects may be reduced or prevented by co-administration of a benzodiazepine:  e.g. lorazepam or diazepam  Dose related and therefore less common at  low doses: hypertension (or hypotension), excessive sedation  Monitor for respiratory depression when given with other medications that cause sedation.   Discontinuation of ketamine:  Tapering down the dose for pain control may be needed with higher doses prior to discontinuation, however the infusion can be stopped abruptly with no formal weaning if patient is experiencing side effects      The inpatient pain service will follow up on patient after consult is placed and offer further recommendations for pain management.  If immediate assistance is needed please contact the pain service at the number below.     Acosta Burnette [facility]  October 4, 2018  11:26 AM    If questions or concerns, please contact the Inpatient Pain Management Service:  Call [phone] after hours, weekends and holidays.   Page [phone] from 8 AM - 3 PM Mon - Fri.    Reviewed and Signed by PAC Pharmacist  Pharmacist: JIE  Date: 10/4/18  Time:4895      Anesthesia Plan      History & Physical Review      ASA Status:  2 .    NPO Status:  > 8 hours    Plan for General and ETT with Intravenous  induction. Maintenance will be Balanced.    PONV prophylaxis:  Ondansetron (or other 5HT-3)       Postoperative Care  Postoperative pain management:  Multi-modal analgesia.      Consents  Anesthetic plan, risks, benefits and alternatives discussed with:  Patient.  Use of blood products discussed: Yes.   Use of blood products discussed with Patient.  Consented to blood products.  .                          .

## 2018-10-04 NOTE — TELEPHONE ENCOUNTER
PREOPERATIVE PAIN CONSULT FOR POSTOPERATIVE PAIN MANAGEMENT  Jonathon Conde was interviewed via phone call on October 4, 2018 prior to PAC Clinic appointment on 10/5/18 (grantetn not seen in person due to scheduling conflict) in preparation for the planned procedure with Dr. Xavier on 10/15/18 at the [facility] for Posterior Spinal Fusion Thoracic 1/2- Thoracic 12, [place] Osteotomies Thoracic 2/3- Thoracic 10.    These recommendations are intended for patients admitted to the hospital after a procedure and are only valid for 30 days from the date of service. If there are significant changes in opioid dosing between today and day of procedure the below recommendations may have to be adjusted. Based on [place] Prescription Monitoring Profile and patient interview:     - OUTPATIENT MEDICATIONS (related to pain management):  -- Long-acting opioid: none  -- Short-acting opioid: none  -- Oral adjuvant(s): none  -- Topicals: none  -- Bowel regimen: none   -- Other relevant medications: uses marijuana PRN for pain control. Has contemplated using     ASSESSMENT:    Jonathon Conde has a history of thoracic back pain and scoliosis who is scheduled for above procedure with Dr. Xavier.  Patient had questions about opioid use after surgery and was concerned about addiction issues related to opioids and wanted to talk through postoperative pain management plan.  He is not currently on opioids and does not have a personal history of opioid misuse, but both his parents have issues with substance misuse.  He currently endorses pain in his midline thoracic spine and wrapping around his chest.  He endorses intermittent sharp pain going up his back to his neck, but does not have radicular pain down his legs.  He does have numbness and tingling at times in his fingers and down his BLE (stops above the knee) when he bends forward to pick things up.  He is not currently taking any medicaitons for his pain  but does smoke marijuana 1-2x/day to help with his pain and appetite.  He has stopped smoking cigarettes and only occasionally drinks alcohol and when he drinks will have 1-2 beers.  He has had 2 previous surgeries (collarbone fracture, wisdom teeth) when he received vicodin, and did well with that and was able to stop without any issues.  He is open to a multimodal pain management plan that minimizes opioid use and maximizes adjuvant therapy but is aware that he will likely need opiods to control his postoperative pain.  He does endorse a good support system in his therapist who he sees every 2 weeks and his significant other (Keisha) who will be with him in the postoperative period.  All of patient's questions regarding pain management after surgery were answered and he was appreciative of the call.      Outpatient opioid oral morphine equivalent (OME): 0 mg OME/day     Other pain therapies tried in the past (not an all inclusive list):   vicodin - helpful for minor surgeries in past  Acetaminophen - tolerates, does not work that well for his back pain so he does not currently use.   Ibuprofen  - tolerates, does not work that well for his back pain so he does not currently use.     Pain therapies never tried (not an all inclusive list):   PCA - patient has not used a PCA previously but is open to using this for postoperative pain management  Ketamine - denies seizures, uncontrolled hypertension, cardiac arrhythmias, PTSD, BPAD, schizophrenia, psychiatric disorders (eg. hallucinations/delirium), history of brain cancer, cardiac failure, previous CVA and increased IOP/glaucoma and is open to using ketamine for pain control.     RECOMMENDATIONS:   The following pain management recommendations are made based on information from today's visit and should not replace medical decision-making based on patient condition at the time of procedure or postoperatively.      - PREOPERATIVE:  --  Before surgery recommend gabapentin  300 mg PO x 1 dose in pre-op area   -- Before procedure recommend acetaminophen 975 mg PO in pre-op   -- Before procedure consider celecoxib 200 mg PO in pre-op     - INTRAOPERATIVE (Anesthesiologist/CRNA to consider):  -- Ketamine IV intraoperatively  -- Avoid remifentanil - to reduce risk of developing hyperalgesia      - POSTOPERATIVE MANAGEMENT:  Place inpatient pain management consult to assist with acute postoperative pain management.    Opioid analgesic:  + Note: if neuraxial opioid or other long acting opioid is given during procedure contact on-call pain provider for adjustment in opioid plan  + Start HYDROmorphone (Dilaudid) PCA dose range 0.2-0.3 mg Q 10 min lockout interval with NO continuous rate.  Start with 0.2mg PCA dose and increase dose by 0.1 mg every 1 hour as necessary for pain control or improvement in physical function, hold or reduce dose for sedation. Notify provider to assess for uncontrolled pain or sedation and adjust dose as necessary.          -- Hold other PO and IV opioids while on PCA.   Nonopioid analgesics:   + Start acetaminophen 975 mg PO every 6 hr scheduled if no concern about masking fevers  + Start gabapentin 300 mg PO every 8 hours scheduled   + Start lidocaine 4% patches 3 patch daily (12 hr on 12 hr off) applied to intact skin only  + Cold therapy PRN     Muscle Relaxant:   + methocarbamol 500 mg PO Q6 hr PRN muscle spasm     Stool softeners/Laxatives:   + When appropriate start senna-docusate 1-2 tabs PO BID and Miralax 17 g daily to prevent opioid induced constipation.     Other:  + Recommend close monitoring of respiratory status postoperatively with capnography and continuous SpO2 monitoring.      + Ketamine IV infusion.  If needed for acute postop uncontrolled pain, the primary service may decide to start ketamine infusion at 5 mg/hr.  Ketamine for acute pain management will be used as an analgesic medication in addition to opioids when usual analgesics are  suboptimal.Only start ketamine IV if patient is stable from a cardiovascular standpoint.    Low dose ketamine may be administered on a regular nursing unit according to [facility] ketamine-low dose policy.  Please see: [email or web address]     Absolute Contraindications (do not start ketamine):     Intracranial hypertension    Uncontrolled hypertension    Known or suspected schizophrenia (even if currently stable or controlled with medications)  Relative contraindications:     Cardiac failure    Previous cerebrovascular accidents    Severe psychiatric disorders associated with hallucinations or delirium  Monitoring while on IV ketamine:    Vital signs 15 min after first dose and after each dose increase    Reassess minimally every 4 hours while patient is receiving stable (unchanging) dose of ketamine  Common adverse effects of ketamine:  Psychotomimetic effects (hallucinations or dreamlike feelings), dysphoria, anxiousness, restlessness, cognitive impairment and mild sedation are most common adverse effect at these low doses, but even this adverse event doesn t happen all the time.  The psychomimetic effects may be reduced or prevented by co-administration of a benzodiazepine:  e.g. lorazepam or diazepam    Dose related and therefore less common at  low doses: hypertension (or hypotension), excessive sedation    Monitor for respiratory depression when given with other medications that cause sedation.   Discontinuation of ketamine:    Tapering down the dose for pain control may be needed with higher doses prior to discontinuation, however the infusion can be stopped abruptly with no formal weaning if patient is experiencing side effects      The inpatient pain service will follow up on patient after consult is placed and offer further recommendations for pain management.  If immediate assistance is needed please contact the pain service at the  number below.     Acosta Burnette, [facility]  October 4, 2018  11:26 AM    If questions or concerns, please contact the Inpatient Pain Management Service:  Call [phone] after hours, weekends and holidays.   Page [phone] from 8 AM - 3 PM Mon - Fri.

## 2018-10-05 ENCOUNTER — PRE VISIT (OUTPATIENT)
Dept: SURGERY | Facility: CLINIC | Age: 28
End: 2018-10-05

## 2018-10-05 ENCOUNTER — OFFICE VISIT (OUTPATIENT)
Dept: SURGERY | Facility: CLINIC | Age: 28
End: 2018-10-05
Payer: COMMERCIAL

## 2018-10-05 VITALS
OXYGEN SATURATION: 99 % | SYSTOLIC BLOOD PRESSURE: 127 MMHG | BODY MASS INDEX: 23.44 KG/M2 | HEART RATE: 88 BPM | TEMPERATURE: 97.5 F | DIASTOLIC BLOOD PRESSURE: 55 MMHG | HEIGHT: 75 IN | WEIGHT: 188.5 LBS

## 2018-10-05 DIAGNOSIS — Z01.818 PREOP EXAMINATION: ICD-10-CM

## 2018-10-05 DIAGNOSIS — M41.9 SCOLIOSIS, UNSPECIFIED SCOLIOSIS TYPE, UNSPECIFIED SPINAL REGION: ICD-10-CM

## 2018-10-05 DIAGNOSIS — Z01.818 PREOP EXAMINATION: Primary | ICD-10-CM

## 2018-10-05 LAB
ALBUMIN UR-MCNC: NEGATIVE MG/DL
ANION GAP SERPL CALCULATED.3IONS-SCNC: 5 MMOL/L (ref 3–14)
APPEARANCE UR: CLEAR
APTT PPP: 27 SEC (ref 22–37)
BILIRUB UR QL STRIP: NEGATIVE
BUN SERPL-MCNC: 16 MG/DL (ref 7–30)
CALCIUM SERPL-MCNC: 8.3 MG/DL (ref 8.5–10.1)
CHLORIDE SERPL-SCNC: 101 MMOL/L (ref 94–109)
CO2 SERPL-SCNC: 30 MMOL/L (ref 20–32)
COLOR UR AUTO: NORMAL
CREAT SERPL-MCNC: 1.26 MG/DL (ref 0.66–1.25)
ERYTHROCYTE [DISTWIDTH] IN BLOOD BY AUTOMATED COUNT: 11.6 % (ref 10–15)
GFR SERPL CREATININE-BSD FRML MDRD: 68 ML/MIN/1.7M2
GLUCOSE SERPL-MCNC: 96 MG/DL (ref 70–99)
GLUCOSE UR STRIP-MCNC: NEGATIVE MG/DL
HBA1C MFR BLD: 5.6 % (ref 0–5.6)
HCT VFR BLD AUTO: 44.5 % (ref 40–53)
HGB BLD-MCNC: 14.9 G/DL (ref 13.3–17.7)
HGB UR QL STRIP: NEGATIVE
INR PPP: 1.01 (ref 0.86–1.14)
KETONES UR STRIP-MCNC: NEGATIVE MG/DL
LEUKOCYTE ESTERASE UR QL STRIP: NEGATIVE
MCH RBC QN AUTO: 30.9 PG (ref 26.5–33)
MCHC RBC AUTO-ENTMCNC: 33.5 G/DL (ref 31.5–36.5)
MCV RBC AUTO: 92 FL (ref 78–100)
MRSA DNA SPEC QL NAA+PROBE: NEGATIVE
NITRATE UR QL: NEGATIVE
PH UR STRIP: 7 PH (ref 5–7)
PLATELET # BLD AUTO: 257 10E9/L (ref 150–450)
POTASSIUM SERPL-SCNC: 3.6 MMOL/L (ref 3.4–5.3)
RBC # BLD AUTO: 4.82 10E12/L (ref 4.4–5.9)
SODIUM SERPL-SCNC: 136 MMOL/L (ref 133–144)
SOURCE: NORMAL
SP GR UR STRIP: 1.01 (ref 1–1.03)
SPECIMEN SOURCE: NORMAL
UROBILINOGEN UR STRIP-MCNC: 0 MG/DL (ref 0–2)
WBC # BLD AUTO: 8.5 10E9/L (ref 4–11)

## 2018-10-05 RX ORDER — ACETAMINOPHEN 325 MG/1
975 TABLET ORAL ONCE
Status: CANCELLED | OUTPATIENT
Start: 2018-10-05 | End: 2018-10-05

## 2018-10-05 RX ORDER — GABAPENTIN 300 MG/1
300 CAPSULE ORAL ONCE
Status: CANCELLED | OUTPATIENT
Start: 2018-10-05 | End: 2018-10-05

## 2018-10-05 ASSESSMENT — LIFESTYLE VARIABLES: TOBACCO_USE: 1

## 2018-10-05 NOTE — TELEPHONE ENCOUNTER
RECORDS RECEIVED FROM:   DATE RECEIVED:   NOTES STATUS DETAILS   OFFICE NOTE from Cardiologist Internal    OFFICE NOTE from Pulmonoligist N/A    MEDICATION LIST Internal    DIAGNOSTIC PROCEDURES     ECHO N/A    STRESS TEST N/A    PULMONARY FUNCTION TEST N/A    CORONARY ANGIOGRAM     EKG N/A    IMAGING (DISC & REPORT)      CHEST XRAY Internal

## 2018-10-05 NOTE — PROGRESS NOTES
Maribell Holt,    Your test results are attached.  All of your labs so far are good for surgery.          Penny Rodriguez DNP, RN, ANP-C

## 2018-10-05 NOTE — MR AVS SNAPSHOT
After Visit Summary   10/5/2018    Jonathon Conde    MRN: [id number]           Patient Information     Date Of Birth          1990        Visit Information        Provider Department      10/5/2018 6:00 PM Penny Rodriguez APRN CNP [facility] Preoperative Assessment Center        Today's Diagnoses     Preop examination    -  1      Care Instructions    Enhanced Recovery After Surgery     This is a team effort, including you, to get you back on your feet, eating and drinking normally and out of the hospital as quickly as possible.  The goals are: 1) NO INFECTIONS and   2) RETURN TO NORMAL DIET    How can we achieve these goals?  1) STAY ACTIVE: Walk every day before your surgery; try to increase the amount every day.  Walk after surgery as much as you can-the nurses will help you.  Walking speeds healing and gets you home quicker, you heal better at home and have less risk of infection.     2) INCENTIVE SPIROMETER: Practice your incentive spirometer 4 times per day with 5-10 repetitions each time.  Using the incentive spirometer can strengthen your muscles between your ribs and help you have a strong cough after surgery.  A more effective cough can help prevent problems with your lungs.    3) STAY HYDRATED: Drink clear liquids up until 2 hours before your surgery. We would like you to purchase a drink such as Gatorade or Ensure Clear (not the milkshake type).  Drink this before bedtime and on the way into the hospital, drink between 8-12 ounces or until you feel hydrated.  Keeping well hydrated leads to your veins being plump, you wake up faster, and you are less likely to be nauseated. Start drinking water as soon as you can after surgery and advance to clear liquids and food as tolerated.  IV fluids contain salt, drinking fluids will minimize the amount of IV fluids you need and decrease the amount of salt you get.    The most common reason for the patient to be readmitted is dehydration.  Staying hydrated after you go home from the hospital is very important.  Ensure or Ensure Clear are good options to keep you hydrated.     4) PAIN MANAGEMENT: If we minimize the amount of opioids and narcotics, and use regional blocks (which numb the area where your surgery is) along with oral pain medications; you will have less side effects of nausea and constipation. Narcotics can slow down your bowels and cause you to stay in the hospital longer.     Our goal is to keep you comfortable; eating and drinking normally and back home safely. Preparing for Your Surgery      Name:  Jonathon Conde   MRN:  [id number]   :  1990   Today's Date:  10/5/2018     Arriving for surgery:  Surgery date:  10/15/18  Arrival time:  05:30 am  Please come to:     [facility] [address]  [address] [place], [place] [address]. [place]  [address]    -[facility] parking is available in front of [facility] from 5:15AM to 8:00PM. If you prefer, park your car in the Green Lot.    -Proceed to the 3rd floor, check in at the Adult Surgery Waiting Lounge. [phone]    If an escort is needed stop at the Information Desk in the lobby. Inform the information person that you are here for surgery. An escort to the Adult Surgery Waiting Lounge will be provided.        What can I eat or drink?  -  You may have solid food or milk products until 8 hours prior to your surgery.  -  You may have water, apple juice or 7up/Sprite until 2 hours prior to your surgery.    Which medicines can I take?  Stop Aspirin, vitamins and supplements one week prior to surgery.  Hold Ibuprofen and Naproxen for 24 hours prior to surgery.    -  Please take these medications the day of surgery:  Tylenol if needed    How do I prepare myself?  -  Take two showers: one the night before surgery; and one the morning of surgery.         Use Scrubcare or Hibiclens to wash from neck down.  You may use your own     shampoo and conditioner. No other hair  products.   -  Do NOT use lotion, powder, deodorant, or antiperspirant the day of your surgery.  -  Do NOT wear any jewelry.  -  Begin using Incentive Spirometer 1 week prior to surgery.  Use 4 times per day, up    to 5 breaths each time.  Bring Incentive Spirometer to hospital.  - Do not bring your own medications to the hospital, except for inhalers and eye   drops.  -  Bring your ID and insurance card.    Questions or Concerns:  -If you have questions or concerns regarding the day of surgery, please call [phone].     -If you are scheduled at the Ambulatory Surgery Center please call [phone].    -For questions after surgery please call your surgeons office.                     Follow-ups after your visit        Your next 10 appointments already scheduled     Oct 05, 2018  6:00 PM CDT   (Arrive by 5:45 PM)   PAC EVALUATION with BECKI [facility] ([facility])    [address] [place] [id number] [phone]            Oct 15, 2018   Procedure with Patrick Xavier MD   [facility] [place], Same Day Surgery (--)    [address]  [place] [address]   [phone]            Nov 29, 2018  2:45 PM CST   (Arrive by 2:15 PM)   RETURN SCOLIOSIS with Patrick Xavier MD   [facility] ([facility] [place])    [address] [place] [id number] [phone]            Jan 09, 2019  2:30 PM CST   (Arrive by 2:00 PM)   RETURN SCOLIOSIS with Patrick Xavier MD   [facility] ([facility])    [address] [place] [address] [phone]              Future tests that were ordered for you today     Open Future Orders        Priority Expected Expires Ordered    ABO/Rh type and screen Routine 10/5/2018 11/4/2018 10/5/2018    Basic metabolic panel Routine 10/5/2018 11/4/2018 10/5/2018     CBC with platelets Routine 10/5/2018 11/4/2018 10/5/2018    INR Routine 10/5/2018 11/4/2018 10/5/2018    Partial thromboplastin time Routine 10/5/2018 11/4/2018 10/5/2018    UA reflex to Microscopic and Culture Routine 10/5/2018 11/4/2018 10/5/2018    Hemoglobin A1c Routine 10/5/2018 11/4/2018 10/5/2018            Who to contact     Please call your clinic at [phone] to:    Ask questions about your health    Make or cancel appointments    Discuss your medicines    Learn about your test results    Speak to your doctor            Additional Information About Your Visit        Money Toolkit Information     [employer] gives you secure access to your electronic health record. If you see a primary care provider, you can also send messages to your care team and make appointments. If you have questions, please call your primary care clinic.  If you do not have a primary care provider, please call [phone] and they will assist you.      [employer] is an electronic gateway that provides easy, online access to your medical records. With [employer], you can request a clinic appointment, read your test results, renew a prescription or communicate with your care team.     To access your existing account, please contact your [facility] or call [phone] for assistance.        Care EveryWhere ID     This is your Care EveryWhere ID. This could be used by other organizations to access your [place] medical records  [id number]         Blood Pressure from Last 3 Encounters:   05/21/18 116/81   07/17/17 118/82   07/13/17 126/73    Weight from Last 3 Encounters:   05/21/18 84.4 kg (186 lb 1.6 oz)   08/31/17 79.8 kg (175 lb 14.4 oz)   07/17/17 76.2 kg (168 lb)              We Performed the Following     Vitamin D Deficiency        Primary Care Provider Office Phone # Fax #    Kyara Vallejo [phone]       [address] [place] [address]  [facility] [address]        Equal Access to  Services     [facility]: Hadii lucero Dumont, wamattieda luqadaha, qaybta kalauraella cantu, wyatt almonte. So [facility] [phone].    ATENCIÓN: Si habla español, tiene a perrin disposición servicios gratuitos de asistencia lingüística. Llame al [phone].    We comply with applicable federal civil rights laws and [place] laws. We do not discriminate on the basis of race, color, national origin, age, disability, sex, sexual orientation, or gender identity.            Thank you!     Thank you for choosing [facility] PREOPERATIVE ASSESSMENT CENTER  for your care. Our goal is always to provide you with excellent care. Hearing back from our patients is one way we can continue to improve our services. Please take a few minutes to complete the written survey that you may receive in the mail after your visit with us. Thank you!             Your Updated Medication List - Protect others around you: Learn how to safely use, store and throw away your medicines at www.disposemymeds.org.          This list is accurate as of 10/5/18  5:21 PM.  Always use your most recent med list.                   Brand Name Dispense Instructions for use Diagnosis    Cholecalciferol 69409 units Tabs    DIALYVITE VITAMIN D3 MAX    4 tablet    Take 50,000 Units by mouth once a week For one month    Vitamin D deficiency

## 2018-10-05 NOTE — PATIENT INSTRUCTIONS
Enhanced Recovery After Surgery     This is a team effort, including you, to get you back on your feet, eating and drinking normally and out of the hospital as quickly as possible.  The goals are: 1) NO INFECTIONS and   2) RETURN TO NORMAL DIET    How can we achieve these goals?  1) STAY ACTIVE: Walk every day before your surgery; try to increase the amount every day.  Walk after surgery as much as you can-the nurses will help you.  Walking speeds healing and gets you home quicker, you heal better at home and have less risk of infection.     2) INCENTIVE SPIROMETER: Practice your incentive spirometer 4 times per day with 5-10 repetitions each time.  Using the incentive spirometer can strengthen your muscles between your ribs and help you have a strong cough after surgery.  A more effective cough can help prevent problems with your lungs.    3) STAY HYDRATED: Drink clear liquids up until 2 hours before your surgery. We would like you to purchase a drink such as Gatorade or Ensure Clear (not the milkshake type).  Drink this before bedtime and on the way into the hospital, drink between 8-12 ounces or until you feel hydrated.  Keeping well hydrated leads to your veins being plump, you wake up faster, and you are less likely to be nauseated. Start drinking water as soon as you can after surgery and advance to clear liquids and food as tolerated.  IV fluids contain salt, drinking fluids will minimize the amount of IV fluids you need and decrease the amount of salt you get.    The most common reason for the patient to be readmitted is dehydration. Staying hydrated after you go home from the hospital is very important.  Ensure or Ensure Clear are good options to keep you hydrated.     4) PAIN MANAGEMENT: If we minimize the amount of opioids and narcotics, and use regional blocks (which numb the area where your surgery is) along with oral pain medications; you will have less side effects of nausea and constipation.  Narcotics can slow down your bowels and cause you to stay in the hospital longer.     Our goal is to keep you comfortable; eating and drinking normally and back home safely. Preparing for Your Surgery      Name:  Jonathon Conde   MRN:  [id number]   :  1990   Today's Date:  10/5/2018     Arriving for surgery:  Surgery date:  10/15/18  Arrival time:  05:30 am  Please come to:     [facility] [address]  [address] [place], [place] [address]. [place]  [address]    -[employer] parking is available in front of [facility] from 5:15AM to 8:00PM. If you prefer, park your car in the Green Lot.    -Proceed to the 3rd floor, check in at the Adult Surgery Waiting Lounge. [phone]    If an escort is needed stop at the Information Desk in the lobby. Inform the information person that you are here for surgery. An escort to the Adult Surgery Waiting Lounge will be provided.        What can I eat or drink?  -  You may have solid food or milk products until 8 hours prior to your surgery.  -  You may have water, apple juice or 7up/Sprite until 2 hours prior to your surgery.    Which medicines can I take?  Stop Aspirin, vitamins and supplements one week prior to surgery.  Hold Ibuprofen and Naproxen for 24 hours prior to surgery.    -  Please take these medications the day of surgery:  Tylenol if needed    How do I prepare myself?  -  Take two showers: one the night before surgery; and one the morning of surgery.         Use Scrubcare or Hibiclens to wash from neck down.  You may use your own     shampoo and conditioner. No other hair products.   -  Do NOT use lotion, powder, deodorant, or antiperspirant the day of your surgery.  -  Do NOT wear any jewelry.  -  Begin using Incentive Spirometer 1 week prior to surgery.  Use 4 times per day, up    to 5 breaths each time.  Bring Incentive Spirometer to hospital.  - Do not bring your own medications to the hospital, except for inhalers and eye   drops.  -   Bring your ID and insurance card.    Questions or Concerns:  -If you have questions or concerns regarding the day of surgery, please call [phone].     -If you are scheduled at the Ambulatory Surgery Center please call [phone].    -For questions after surgery please call your surgeons office.

## 2018-10-05 NOTE — H&P
Pre-Operative H & P     CC:  Preoperative exam to assess for increased cardiopulmonary risk while undergoing surgery and anesthesia.    Date of Encounter: 10/5/2018  Primary Care Physician:  Kyara Quiñones  Associated diagnosis:  Scoliosis, chronic back pain    HPI  Jonathon Conde is a 27 year old male who presents for pre-operative H & P in preparation for a Posterior Spinal Fusion Thoracic 1/2- Thoracic 12, [place] Osteotomies Thoracic 2/3- Thoracic 10 with Dr. Xavier on 10/15/2018 at [facility].     Jonathon Conde is a 27 year old male with history of smoking and marijuana use that has chronic back pain secondary to scoliosis.  He reports having back pain at least since his early teenage years, but that the pain has gradually worsened over the last few years.  He wasn't aware of having scoliosis until he presented for evaluation at age 18.  He has avoided narcotics due to a family history of substance abuse and tylenol and ibuprofen haven't been particularly helpful.  His current pain management measure is daily marijuana or CBD oil vaping.  He has consulted with Dr. Xavier and the above listed procedure has been recommended for treatment.      History is obtained from the patient and the medical chart.     Past Medical History  Past Medical History:   Diagnosis Date     Chronic back pain      Scoliosis        Past Surgical History  Past Surgical History:   Procedure Laterality Date     CLAVICLE SURGERY Right Age 12     wisdom teeth      15 years old       Hx of Blood transfusions/reactions: none    Hx of abnormal bleeding or anti-platelet use: none        Steroid use in the last year: none    Personal or FH with difficulty with Anesthesia:  none    Prior to Admission Medications  Current Outpatient Prescriptions   Medication Sig Dispense Refill     Cholecalciferol (DIALYVITE VITAMIN D3 MAX) 91694 units TABS Take 50,000 Units by mouth once a week For one  month 4 tablet 0       Allergies  Allergies   Allergen Reactions     Milk Protein Extract GI Disturbance     Sulfa Drugs      PN: LW Reaction: rash       Social History  Social History     Social History     Marital status: Single     Spouse name: N/A     Number of children: 0     Years of education: N/A     Occupational History     [employer]      Social History Main Topics     Smoking status: Former Smoker     Packs/day: 0.25     Years: 13.00     Types: Cigarettes     Quit date: 2/21/2018     Smokeless tobacco: Never Used     Alcohol use 3.6 oz/week     6 Cans of beer per week     Drug use: 7.00 per week     Special: Marijuana      Comment: evenings     Sexual activity: Yes     Partners: Female     Other Topics Concern     Not on file     Social History Narrative       Family History  Family History   Problem Relation Age of Onset     Mental Illness Maternal Grandmother      Substance Abuse Mother      Mental Illness Mother      No Known Problems Father      No Known Problems Brother      Unknown/Adopted Maternal Grandfather      No Known Problems Paternal Grandmother      No Known Problems Paternal Grandfather      Cancer No family hx of      Diabetes No family hx of      HEART DISEASE No family hx of              ROS/MED HX  The complete review of systems is negative other than noted in the HPI or here.   ENT/Pulmonary:     (+)tobacco use, Past use 0.25 packs/day  , . Other pulmonary disease smokes marijuana and/or marijuana oil vaping cartridge daily for pain management.    Neurologic:  - neg neurologic ROS     Cardiovascular:  - neg cardiovascular ROS   (+) ----. : . . . :. . No previous cardiac testing       METS/Exercise Tolerance:  >4 METS   Hematologic:  - neg hematologic  ROS       Musculoskeletal:   (+) , , other musculoskeletal- thoracic spine scoliosis with chronic back pain      GI/Hepatic:  - neg GI/hepatic ROS       Renal/Genitourinary:  - ROS Renal section negative       Endo:  - neg endo ROS      "  Psychiatric:  - neg psychiatric ROS       Infectious Disease:  - neg infectious disease ROS       Malignancy:      - no malignancy   Other:    (+) H/O Chronic Pain,                 Temp: 97.5  F (36.4  C) Temp src: Oral BP: 127/55 Pulse: 88     SpO2: 99 %         188 lbs 8 oz  6' 3\"   Body mass index is 23.56 kg/(m^2).       Physical Exam  Constitutional: Awake, alert, cooperative, no apparent distress, and appears stated age.  Eyes: Pupils equal, round and reactive to light, extra ocular muscles intact, sclera clear, conjunctiva normal.  HENT: Normocephalic, oral pharynx with moist mucus membranes, good dentition. No goiter appreciated.   Respiratory: Clear to auscultation bilaterally, no crackles or wheezing.  Cardiovascular: Regular rate and rhythm, normal S1 and S2, and no murmur noted.  Carotids +2, no bruits. No edema. Palpable pulses to radial  DP and PT arteries.   GI: Normal bowel sounds, soft, non-distended, non-tender, no masses palpated, no hepatosplenomegaly.    Lymph/Hematologic: No cervical lymphadenopathy and no supraclavicular lymphadenopathy.  Genitourinary:  deferred  Skin: Warm and dry.  No rashes at anticipated surgical site.   Musculoskeletal: Full ROM of neck. There is no redness, warmth, or swelling of the exposed joints. Gross motor strength is normal.    Neurologic: Awake, alert, oriented to name, place and time. Cranial nerves II-XII are grossly intact. Gait is normal.   Neuropsychiatric: Calm, cooperative. Normal affect.     Labs: (personally reviewed)   Component      Latest Ref Rng & Units 10/5/2018   Color Urine       Straw   Appearance Urine       Clear   Glucose Urine      NEG:Negative mg/dL Negative   Bilirubin Urine      NEG:Negative Negative   Ketones Urine      NEG:Negative mg/dL Negative   Specific Gravity Urine      1.003 - 1.035 1.011   Blood Urine      NEG:Negative Negative   pH Urine      5.0 - 7.0 pH 7.0   Protein Albumin Urine      NEG:Negative mg/dL Negative "   Urobilinogen mg/dL      0.0 - 2.0 mg/dL 0.0   Nitrite Urine      NEG:Negative Negative   Leukocyte Esterase Urine      NEG:Negative Negative   Source       Midstream Urine   Sodium      133 - 144 mmol/L 136   Potassium      3.4 - 5.3 mmol/L 3.6   Chloride      94 - 109 mmol/L 101   Carbon Dioxide      20 - 32 mmol/L 30   Anion Gap      3 - 14 mmol/L 5   Glucose      70 - 99 mg/dL 96   Urea Nitrogen      7 - 30 mg/dL 16   Creatinine      0.66 - 1.25 mg/dL 1.26 (H)   GFR Estimate      >60 mL/min/1.7m2 68   GFR Estimate If Black      >60 mL/min/1.7m2 82   Calcium      8.5 - 10.1 mg/dL 8.3 (L)   WBC      4.0 - 11.0 10e9/L 8.5   RBC Count      4.4 - 5.9 10e12/L 4.82   Hemoglobin      13.3 - 17.7 g/dL 14.9   Hematocrit      40.0 - 53.0 % 44.5   MCV      78 - 100 fl 92   MCH      26.5 - 33.0 pg 30.9   MCHC      31.5 - 36.5 g/dL 33.5   RDW      10.0 - 15.0 % 11.6   Platelet Count      150 - 450 10e9/L 257   INR      0.86 - 1.14 1.01   PTT      22 - 37 sec 27   Hemoglobin A1C      0 - 5.6 % 5.6             ASSESSMENT and PLAN  Jonathon Conde is a 27 year old male scheduled for a Posterior Spinal Fusion Thoracic 1/2- Thoracic 12, [place] Osteotomies Thoracic 2/3- Thoracic 10 on 10/15/2018 by Dr. Xavier in treatment of scoliosis and chronic back pain.  PAC referral for risk assessment and optimization for anesthesia with comorbid conditions of: history of smoking and marijuana use.     Pre-operative considerations:  1.  Cardiac:  Functional status- METS >4. He long boards and jogs regularly for exercise.  He has no known cardiac conditions.  High risk surgery with 0.4% risk of major adverse cardiac event.   2.  Pulm:  Airway feasible.  MARY risk: low.  He no long smokes cigarettes.  He quit in Feb 2018.  He smokes or vapes marijuana daily for pain control.    3.  GI:  Risk of PONV score = 2.  If > 2, anti-emetic intervention recommended.  4. Pain management: He is very concerned for risk of abuse/dependence with any  narcotic that may be used post-op due to family history.  Please see the PharmD note with pain management recommendations.      VTE risk: 0.5%    Celebrex ordered deferred from ERAS meds due to sulfa allergy.      Patient is optimized and is acceptable candidate for the proposed procedure.  No further diagnostic evaluation is needed.     Patient discussed with Dr. Garcia.       **For further details of assessment, testing, and physical exam please see H and P completed on same date.          Penny Rodriguez DNP, RN, APRN  Preoperative Assessment Center  [facility]  Clinic and Surgery Center  Phone: [phone]  Fax: [phone]

## 2018-10-07 LAB — DEPRECATED CALCIDIOL+CALCIFEROL SERPL-MC: 27 UG/L (ref 20–75)

## 2018-10-08 ENCOUNTER — MYC MEDICAL ADVICE (OUTPATIENT)
Dept: ORTHOPEDICS | Facility: CLINIC | Age: 28
End: 2018-10-08

## 2018-10-08 ENCOUNTER — TELEPHONE (OUTPATIENT)
Dept: ORTHOPEDICS | Facility: CLINIC | Age: 28
End: 2018-10-08

## 2018-10-08 NOTE — TELEPHONE ENCOUNTER
Patient was called and asked if able to successfully fax in paperwork. Patient was unable to due to having the wrong number, I provided the fax number [phone]. Patient stated that he was going to try sending that in right away.    Guillermo Francisco, ATC

## 2018-10-08 NOTE — TELEPHONE ENCOUNTER
[facility] Call Center    Phone Message    May a detailed message be left on voicemail: yes    Reason for Call: PT is sending in FMLA paperwork that's needed before surgery on 10/15. PT said they were trying to fax it but if that doesn't work they were going to send it via [employer].    Action Taken: Message routed to:  [facility] ([facility]): ORTHO

## 2018-10-08 NOTE — TELEPHONE ENCOUNTER
Jonathon is calling to confirm that his [facility] paperwork is accessible below? If not he needs a number to fax it.  Fax number he has isn't going thru.  I am able to open the document.  It would appear link is functional.      He requests call back if he needs to do something differently.

## 2018-10-09 NOTE — PROGRESS NOTES
Maribell Holt,    Your test results are attached.  Your vit D level is back to normal.  Keep taking your vit D supplement.  Your MRSA nasal swab is negative.        Penny Rodriguez DNP, RN, ANP-C

## 2018-10-15 ENCOUNTER — HOSPITAL ENCOUNTER (INPATIENT)
Facility: CLINIC | Age: 28
LOS: 4 days | Discharge: HOME | DRG: 458 | End: 2018-10-19
Attending: ORTHOPAEDIC SURGERY | Admitting: ORTHOPAEDIC SURGERY
Payer: COMMERCIAL

## 2018-10-15 ENCOUNTER — ANESTHESIA (OUTPATIENT)
Dept: SURGERY | Facility: CLINIC | Age: 28
DRG: 458 | End: 2018-10-15
Payer: COMMERCIAL

## 2018-10-15 ENCOUNTER — APPOINTMENT (OUTPATIENT)
Dept: GENERAL RADIOLOGY | Facility: CLINIC | Age: 28
DRG: 458 | End: 2018-10-15
Attending: ORTHOPAEDIC SURGERY
Payer: COMMERCIAL

## 2018-10-15 DIAGNOSIS — Z98.1 HISTORY OF THORACIC SPINAL FUSION: ICD-10-CM

## 2018-10-15 DIAGNOSIS — M41.54 OTHER SECONDARY SCOLIOSIS, THORACIC REGION: Primary | ICD-10-CM

## 2018-10-15 PROBLEM — M41.9 SCOLIOSIS DEFORMITY OF SPINE: Status: ACTIVE | Noted: 2018-10-15

## 2018-10-15 LAB
ABO + RH BLD: NORMAL
ABO + RH BLD: NORMAL
APTT PPP: 29 SEC (ref 22–37)
BASE EXCESS BLDA CALC-SCNC: 1.2 MMOL/L
BASE EXCESS BLDA CALC-SCNC: 1.3 MMOL/L
BLD GP AB SCN SERPL QL: NORMAL
BLOOD BANK CMNT PATIENT-IMP: NORMAL
BLOOD BANK CMNT PATIENT-IMP: NORMAL
CA-I BLD-MCNC: 4.4 MG/DL (ref 4.4–5.2)
CA-I BLD-MCNC: 4.7 MG/DL (ref 4.4–5.2)
FIBRINOGEN PPP-MCNC: 180 MG/DL (ref 200–420)
GLUCOSE BLD-MCNC: 108 MG/DL (ref 70–99)
GLUCOSE BLD-MCNC: 113 MG/DL (ref 70–99)
GLUCOSE BLDC GLUCOMTR-MCNC: 92 MG/DL (ref 70–99)
HCO3 BLD-SCNC: 25 MMOL/L (ref 21–28)
HCO3 BLD-SCNC: 25 MMOL/L (ref 21–28)
HGB BLD-MCNC: 13.2 G/DL (ref 13.3–17.7)
HGB BLD-MCNC: 14 G/DL (ref 13.3–17.7)
INR PPP: 1.15 (ref 0.86–1.14)
O2/TOTAL GAS SETTING VFR VENT: 50 %
O2/TOTAL GAS SETTING VFR VENT: 60 %
PCO2 BLD: 36 MM HG (ref 35–45)
PCO2 BLD: 37 MM HG (ref 35–45)
PH BLD: 7.44 PH (ref 7.35–7.45)
PH BLD: 7.45 PH (ref 7.35–7.45)
PLATELET # BLD AUTO: 279 10E9/L (ref 150–450)
PO2 BLD: 184 MM HG (ref 80–105)
PO2 BLD: 235 MM HG (ref 80–105)
POTASSIUM BLD-SCNC: 3.5 MMOL/L (ref 3.4–5.3)
POTASSIUM BLD-SCNC: 4 MMOL/L (ref 3.4–5.3)
SODIUM BLD-SCNC: 136 MMOL/L (ref 133–144)
SODIUM BLD-SCNC: 136 MMOL/L (ref 133–144)
SPECIMEN EXP DATE BLD: NORMAL

## 2018-10-15 PROCEDURE — 99207 ZZC MOONLIGHTING INDICATOR: CPT | Performed by: INTERNAL MEDICINE

## 2018-10-15 PROCEDURE — 25000128 H RX IP 250 OP 636: Performed by: ANESTHESIOLOGY

## 2018-10-15 PROCEDURE — 4A11X4G MONITORING OF PERIPHERAL NERVOUS ELECTRICAL ACTIVITY, INTRAOPERATIVE, EXTERNAL APPROACH: ICD-10-PCS | Performed by: ORTHOPAEDIC SURGERY

## 2018-10-15 PROCEDURE — C1713 ANCHOR/SCREW BN/BN,TIS/BN: HCPCS | Performed by: ORTHOPAEDIC SURGERY

## 2018-10-15 PROCEDURE — 25000301 ZZH OR RX SURGIFLO W/THROMBIN KIT 2ML 1991 OPNP: Performed by: ORTHOPAEDIC SURGERY

## 2018-10-15 PROCEDURE — 25000125 ZZHC RX 250: Performed by: PHYSICIAN ASSISTANT

## 2018-10-15 PROCEDURE — 25000128 H RX IP 250 OP 636: Performed by: NURSE ANESTHETIST, CERTIFIED REGISTERED

## 2018-10-15 PROCEDURE — 36000078 ZZH SURGERY LEVEL 6 W FLUORO 1ST 30 MIN - UMMC: Performed by: ORTHOPAEDIC SURGERY

## 2018-10-15 PROCEDURE — 27210794 ZZH OR GENERAL SUPPLY STERILE: Performed by: ORTHOPAEDIC SURGERY

## 2018-10-15 PROCEDURE — 12000001 ZZH R&B MED SURG/OB UMMC

## 2018-10-15 PROCEDURE — 71000015 ZZH RECOVERY PHASE 1 LEVEL 2 EA ADDTL HR: Performed by: ORTHOPAEDIC SURGERY

## 2018-10-15 PROCEDURE — 85610 PROTHROMBIN TIME: CPT | Performed by: ORTHOPAEDIC SURGERY

## 2018-10-15 PROCEDURE — 8E0WXBF COMPUTER ASSISTED PROCEDURE OF TRUNK REGION, WITH FLUOROSCOPY: ICD-10-PCS | Performed by: ORTHOPAEDIC SURGERY

## 2018-10-15 PROCEDURE — 25000125 ZZHC RX 250: Performed by: ANESTHESIOLOGY

## 2018-10-15 PROCEDURE — 82330 ASSAY OF CALCIUM: CPT | Performed by: ORTHOPAEDIC SURGERY

## 2018-10-15 PROCEDURE — 25000125 ZZHC RX 250: Performed by: NURSE ANESTHETIST, CERTIFIED REGISTERED

## 2018-10-15 PROCEDURE — 82803 BLOOD GASES ANY COMBINATION: CPT | Performed by: ORTHOPAEDIC SURGERY

## 2018-10-15 PROCEDURE — 85384 FIBRINOGEN ACTIVITY: CPT | Performed by: ORTHOPAEDIC SURGERY

## 2018-10-15 PROCEDURE — C1762 CONN TISS, HUMAN(INC FASCIA): HCPCS | Performed by: ORTHOPAEDIC SURGERY

## 2018-10-15 PROCEDURE — 84295 ASSAY OF SERUM SODIUM: CPT | Performed by: ORTHOPAEDIC SURGERY

## 2018-10-15 PROCEDURE — 25000132 ZZH RX MED GY IP 250 OP 250 PS 637: Performed by: NURSE PRACTITIONER

## 2018-10-15 PROCEDURE — 00000146 ZZHCL STATISTIC GLUCOSE BY METER IP

## 2018-10-15 PROCEDURE — 25000128 H RX IP 250 OP 636: Performed by: STUDENT IN AN ORGANIZED HEALTH CARE EDUCATION/TRAINING PROGRAM

## 2018-10-15 PROCEDURE — 37000008 ZZH ANESTHESIA TECHNICAL FEE, 1ST 30 MIN: Performed by: ORTHOPAEDIC SURGERY

## 2018-10-15 PROCEDURE — 37000009 ZZH ANESTHESIA TECHNICAL FEE, EACH ADDTL 15 MIN: Performed by: ORTHOPAEDIC SURGERY

## 2018-10-15 PROCEDURE — 0RG8071 FUSION OF 8 OR MORE THORACIC VERTEBRAL JOINTS WITH AUTOLOGOUS TISSUE SUBSTITUTE, POSTERIOR APPROACH, POSTERIOR COLUMN, OPEN APPROACH: ICD-10-PCS | Performed by: ORTHOPAEDIC SURGERY

## 2018-10-15 PROCEDURE — 71000014 ZZH RECOVERY PHASE 1 LEVEL 2 FIRST HR: Performed by: ORTHOPAEDIC SURGERY

## 2018-10-15 PROCEDURE — 25000128 H RX IP 250 OP 636

## 2018-10-15 PROCEDURE — 25000128 H RX IP 250 OP 636: Performed by: PHYSICIAN ASSISTANT

## 2018-10-15 PROCEDURE — 99207 ZZC NO CHARGE COORDINATED CARE PS: CPT

## 2018-10-15 PROCEDURE — 40000170 ZZH STATISTIC PRE-PROCEDURE ASSESSMENT II: Performed by: ORTHOPAEDIC SURGERY

## 2018-10-15 PROCEDURE — 25000125 ZZHC RX 250: Performed by: ORTHOPAEDIC SURGERY

## 2018-10-15 PROCEDURE — 27211022 ZZHC OR IOM SUPPLIES OPNP: Performed by: ORTHOPAEDIC SURGERY

## 2018-10-15 PROCEDURE — 25000132 ZZH RX MED GY IP 250 OP 250 PS 637: Performed by: STUDENT IN AN ORGANIZED HEALTH CARE EDUCATION/TRAINING PROGRAM

## 2018-10-15 PROCEDURE — 36000076 ZZH SURGERY LEVEL 6 EA 15 ADDTL MIN - UMMC: Performed by: ORTHOPAEDIC SURGERY

## 2018-10-15 PROCEDURE — 82947 ASSAY GLUCOSE BLOOD QUANT: CPT | Performed by: ORTHOPAEDIC SURGERY

## 2018-10-15 PROCEDURE — 27210995 ZZH RX 272: Performed by: ORTHOPAEDIC SURGERY

## 2018-10-15 PROCEDURE — 25000128 H RX IP 250 OP 636: Performed by: ORTHOPAEDIC SURGERY

## 2018-10-15 PROCEDURE — 27211020 ZZHC OR CELL SAVER OPNP: Performed by: ORTHOPAEDIC SURGERY

## 2018-10-15 PROCEDURE — 85049 AUTOMATED PLATELET COUNT: CPT | Performed by: ORTHOPAEDIC SURGERY

## 2018-10-15 PROCEDURE — 25000565 ZZH ISOFLURANE, EA 15 MIN: Performed by: ORTHOPAEDIC SURGERY

## 2018-10-15 PROCEDURE — P9041 ALBUMIN (HUMAN),5%, 50ML: HCPCS | Performed by: NURSE ANESTHETIST, CERTIFIED REGISTERED

## 2018-10-15 PROCEDURE — 95940 IONM IN OPERATNG ROOM 15 MIN: CPT | Performed by: ORTHOPAEDIC SURGERY

## 2018-10-15 PROCEDURE — 84132 ASSAY OF SERUM POTASSIUM: CPT | Performed by: ORTHOPAEDIC SURGERY

## 2018-10-15 PROCEDURE — 40000278 XR SURGERY CARM FLUORO LESS THAN 5 MIN: Mod: TC

## 2018-10-15 PROCEDURE — 85730 THROMBOPLASTIN TIME PARTIAL: CPT | Performed by: ORTHOPAEDIC SURGERY

## 2018-10-15 DEVICE — IMP SCR MEDT 5.5/6.0MM SOLERA 5.5X35MM MA 55840005535: Type: IMPLANTABLE DEVICE | Site: SPINE THORACIC | Status: FUNCTIONAL

## 2018-10-15 DEVICE — IMP SCR MEDT 5.5/6.0MM SOLERA 5.5X45MM MA 55840005545: Type: IMPLANTABLE DEVICE | Site: SPINE THORACIC | Status: FUNCTIONAL

## 2018-10-15 DEVICE — IMP SCR MEDT 5.5/6.0MM SOLERA 6.5X45MM MA 55840006545: Type: IMPLANTABLE DEVICE | Site: SPINE THORACIC | Status: FUNCTIONAL

## 2018-10-15 DEVICE — GRAFT BONE CRUSH CANC 30ML 400080: Type: IMPLANTABLE DEVICE | Site: SPINE THORACIC | Status: FUNCTIONAL

## 2018-10-15 DEVICE — IMP SCR MEDT 5.5/6.0MM SOLERA 7.5X45MM MA 55840007545: Type: IMPLANTABLE DEVICE | Site: SPINE THORACIC | Status: FUNCTIONAL

## 2018-10-15 DEVICE — IMP SCR SET MEDT SOLERA BREAK OFF 5.5MM TI 5540030: Type: IMPLANTABLE DEVICE | Site: SPINE THORACIC | Status: FUNCTIONAL

## 2018-10-15 DEVICE — IMP SCR MEDT 5.5/6.0MM SOLERA 7.5X40MM MA 55840007540: Type: IMPLANTABLE DEVICE | Site: SPINE THORACIC | Status: FUNCTIONAL

## 2018-10-15 DEVICE — IMP SCR MEDT 5.5/6.0MM SOLERA 6.5X40MM MA 55840006540: Type: IMPLANTABLE DEVICE | Site: SPINE THORACIC | Status: FUNCTIONAL

## 2018-10-15 DEVICE — IMP SCR MEDT 5.5/6.0MM SOLERA 6.5X35MM MA 55840006535: Type: IMPLANTABLE DEVICE | Site: SPINE THORACIC | Status: FUNCTIONAL

## 2018-10-15 DEVICE — IMP SCR MEDT 5.5/6.0MM SOLERA 4.5X40MM MA 55840004540: Type: IMPLANTABLE DEVICE | Site: SPINE THORACIC | Status: FUNCTIONAL

## 2018-10-15 DEVICE — IMP ROD MEDT SOLERA LINED 5.5X500MM CHR 1555200500: Type: IMPLANTABLE DEVICE | Site: SPINE THORACIC | Status: FUNCTIONAL

## 2018-10-15 RX ORDER — LIDOCAINE 4 G/G
2 PATCH TOPICAL
Status: DISCONTINUED | OUTPATIENT
Start: 2018-10-16 | End: 2018-10-19 | Stop reason: HOSPADM

## 2018-10-15 RX ORDER — CEFAZOLIN SODIUM 1 G/3ML
1 INJECTION, POWDER, FOR SOLUTION INTRAMUSCULAR; INTRAVENOUS EVERY 8 HOURS
Status: COMPLETED | OUTPATIENT
Start: 2018-10-15 | End: 2018-10-16

## 2018-10-15 RX ORDER — SODIUM CHLORIDE, SODIUM GLUCONATE, SODIUM ACETATE, POTASSIUM CHLORIDE AND MAGNESIUM CHLORIDE 526; 502; 368; 37; 30 MG/100ML; MG/100ML; MG/100ML; MG/100ML; MG/100ML
INJECTION, SOLUTION INTRAVENOUS CONTINUOUS PRN
Status: DISCONTINUED | OUTPATIENT
Start: 2018-10-15 | End: 2018-10-15

## 2018-10-15 RX ORDER — LIDOCAINE HYDROCHLORIDE 20 MG/ML
INJECTION, SOLUTION INFILTRATION; PERINEURAL PRN
Status: DISCONTINUED | OUTPATIENT
Start: 2018-10-15 | End: 2018-10-15

## 2018-10-15 RX ORDER — METOCLOPRAMIDE HYDROCHLORIDE 5 MG/ML
10 INJECTION INTRAMUSCULAR; INTRAVENOUS EVERY 6 HOURS PRN
Status: DISCONTINUED | OUTPATIENT
Start: 2018-10-15 | End: 2018-10-19 | Stop reason: HOSPADM

## 2018-10-15 RX ORDER — SODIUM CHLORIDE, SODIUM LACTATE, POTASSIUM CHLORIDE, CALCIUM CHLORIDE 600; 310; 30; 20 MG/100ML; MG/100ML; MG/100ML; MG/100ML
INJECTION, SOLUTION INTRAVENOUS CONTINUOUS PRN
Status: DISCONTINUED | OUTPATIENT
Start: 2018-10-15 | End: 2018-10-15

## 2018-10-15 RX ORDER — METHOCARBAMOL 750 MG/1
750 TABLET, FILM COATED ORAL EVERY 6 HOURS PRN
Status: DISCONTINUED | OUTPATIENT
Start: 2018-10-15 | End: 2018-10-17

## 2018-10-15 RX ORDER — CEFAZOLIN SODIUM 2 G/100ML
2 INJECTION, SOLUTION INTRAVENOUS
Status: COMPLETED | OUTPATIENT
Start: 2018-10-15 | End: 2018-10-15

## 2018-10-15 RX ORDER — ALBUMIN, HUMAN INJ 5% 5 %
SOLUTION INTRAVENOUS CONTINUOUS PRN
Status: DISCONTINUED | OUTPATIENT
Start: 2018-10-15 | End: 2018-10-15

## 2018-10-15 RX ORDER — AMOXICILLIN 250 MG
1 CAPSULE ORAL 2 TIMES DAILY
Status: DISCONTINUED | OUTPATIENT
Start: 2018-10-15 | End: 2018-10-19 | Stop reason: HOSPADM

## 2018-10-15 RX ORDER — GABAPENTIN 100 MG/1
300 CAPSULE ORAL ONCE
Status: DISCONTINUED | OUTPATIENT
Start: 2018-10-15 | End: 2018-10-15 | Stop reason: HOSPADM

## 2018-10-15 RX ORDER — CEFAZOLIN SODIUM 1 G/3ML
1 INJECTION, POWDER, FOR SOLUTION INTRAMUSCULAR; INTRAVENOUS SEE ADMIN INSTRUCTIONS
Status: DISCONTINUED | OUTPATIENT
Start: 2018-10-15 | End: 2018-10-15 | Stop reason: HOSPADM

## 2018-10-15 RX ORDER — SODIUM CHLORIDE, SODIUM LACTATE, POTASSIUM CHLORIDE, CALCIUM CHLORIDE 600; 310; 30; 20 MG/100ML; MG/100ML; MG/100ML; MG/100ML
INJECTION, SOLUTION INTRAVENOUS CONTINUOUS
Status: DISCONTINUED | OUTPATIENT
Start: 2018-10-15 | End: 2018-10-15 | Stop reason: HOSPADM

## 2018-10-15 RX ORDER — HYDROMORPHONE HYDROCHLORIDE 1 MG/ML
.3-.5 INJECTION, SOLUTION INTRAMUSCULAR; INTRAVENOUS; SUBCUTANEOUS EVERY 5 MIN PRN
Status: DISCONTINUED | OUTPATIENT
Start: 2018-10-15 | End: 2018-10-15 | Stop reason: HOSPADM

## 2018-10-15 RX ORDER — HYDROMORPHONE HYDROCHLORIDE 1 MG/ML
.3-.5 INJECTION, SOLUTION INTRAMUSCULAR; INTRAVENOUS; SUBCUTANEOUS
Status: DISCONTINUED | OUTPATIENT
Start: 2018-10-15 | End: 2018-10-19 | Stop reason: HOSPADM

## 2018-10-15 RX ORDER — NALOXONE HYDROCHLORIDE 0.4 MG/ML
.1-.4 INJECTION, SOLUTION INTRAMUSCULAR; INTRAVENOUS; SUBCUTANEOUS
Status: DISCONTINUED | OUTPATIENT
Start: 2018-10-15 | End: 2018-10-15

## 2018-10-15 RX ORDER — FENTANYL CITRATE 50 UG/ML
INJECTION, SOLUTION INTRAMUSCULAR; INTRAVENOUS PRN
Status: DISCONTINUED | OUTPATIENT
Start: 2018-10-15 | End: 2018-10-15

## 2018-10-15 RX ORDER — PROCHLORPERAZINE MALEATE 10 MG
10 TABLET ORAL EVERY 6 HOURS PRN
Status: DISCONTINUED | OUTPATIENT
Start: 2018-10-15 | End: 2018-10-19 | Stop reason: HOSPADM

## 2018-10-15 RX ORDER — SODIUM CHLORIDE 9 MG/ML
INJECTION, SOLUTION INTRAVENOUS CONTINUOUS
Status: DISCONTINUED | OUTPATIENT
Start: 2018-10-15 | End: 2018-10-19 | Stop reason: HOSPADM

## 2018-10-15 RX ORDER — ONDANSETRON 4 MG/1
4 TABLET, ORALLY DISINTEGRATING ORAL EVERY 30 MIN PRN
Status: DISCONTINUED | OUTPATIENT
Start: 2018-10-15 | End: 2018-10-15 | Stop reason: HOSPADM

## 2018-10-15 RX ORDER — METOCLOPRAMIDE 5 MG/1
10 TABLET ORAL EVERY 6 HOURS PRN
Status: DISCONTINUED | OUTPATIENT
Start: 2018-10-15 | End: 2018-10-19 | Stop reason: HOSPADM

## 2018-10-15 RX ORDER — ONDANSETRON 2 MG/ML
4 INJECTION INTRAMUSCULAR; INTRAVENOUS EVERY 6 HOURS PRN
Status: DISCONTINUED | OUTPATIENT
Start: 2018-10-15 | End: 2018-10-19 | Stop reason: HOSPADM

## 2018-10-15 RX ORDER — BUPIVACAINE HYDROCHLORIDE AND EPINEPHRINE 2.5; 5 MG/ML; UG/ML
INJECTION, SOLUTION INFILTRATION; PERINEURAL PRN
Status: DISCONTINUED | OUTPATIENT
Start: 2018-10-15 | End: 2018-10-15 | Stop reason: HOSPADM

## 2018-10-15 RX ORDER — PROPOFOL 10 MG/ML
INJECTION, EMULSION INTRAVENOUS PRN
Status: DISCONTINUED | OUTPATIENT
Start: 2018-10-15 | End: 2018-10-15

## 2018-10-15 RX ORDER — ONDANSETRON 2 MG/ML
4 INJECTION INTRAMUSCULAR; INTRAVENOUS EVERY 30 MIN PRN
Status: DISCONTINUED | OUTPATIENT
Start: 2018-10-15 | End: 2018-10-15 | Stop reason: HOSPADM

## 2018-10-15 RX ORDER — POLYETHYLENE GLYCOL 3350 17 G/17G
17 POWDER, FOR SOLUTION ORAL DAILY
Status: DISCONTINUED | OUTPATIENT
Start: 2018-10-15 | End: 2018-10-19 | Stop reason: HOSPADM

## 2018-10-15 RX ORDER — FENTANYL CITRATE 50 UG/ML
25-50 INJECTION, SOLUTION INTRAMUSCULAR; INTRAVENOUS
Status: DISCONTINUED | OUTPATIENT
Start: 2018-10-15 | End: 2018-10-15 | Stop reason: HOSPADM

## 2018-10-15 RX ORDER — DIAZEPAM 5 MG
5 TABLET ORAL EVERY 6 HOURS PRN
Status: DISCONTINUED | OUTPATIENT
Start: 2018-10-15 | End: 2018-10-19 | Stop reason: HOSPADM

## 2018-10-15 RX ORDER — ONDANSETRON 2 MG/ML
INJECTION INTRAMUSCULAR; INTRAVENOUS PRN
Status: DISCONTINUED | OUTPATIENT
Start: 2018-10-15 | End: 2018-10-15

## 2018-10-15 RX ORDER — ACETAMINOPHEN 325 MG/1
975 TABLET ORAL EVERY 8 HOURS
Status: DISCONTINUED | OUTPATIENT
Start: 2018-10-15 | End: 2018-10-16

## 2018-10-15 RX ORDER — HYDROXYZINE HYDROCHLORIDE 25 MG/1
25 TABLET, FILM COATED ORAL EVERY 6 HOURS PRN
Status: DISCONTINUED | OUTPATIENT
Start: 2018-10-15 | End: 2018-10-19 | Stop reason: HOSPADM

## 2018-10-15 RX ORDER — NALOXONE HYDROCHLORIDE 0.4 MG/ML
.1-.4 INJECTION, SOLUTION INTRAMUSCULAR; INTRAVENOUS; SUBCUTANEOUS
Status: DISCONTINUED | OUTPATIENT
Start: 2018-10-15 | End: 2018-10-19 | Stop reason: HOSPADM

## 2018-10-15 RX ORDER — ONDANSETRON 4 MG/1
4 TABLET, ORALLY DISINTEGRATING ORAL EVERY 6 HOURS PRN
Status: DISCONTINUED | OUTPATIENT
Start: 2018-10-15 | End: 2018-10-19 | Stop reason: HOSPADM

## 2018-10-15 RX ORDER — GABAPENTIN 100 MG/1
300 CAPSULE ORAL ONCE
Status: COMPLETED | OUTPATIENT
Start: 2018-10-15 | End: 2018-10-15

## 2018-10-15 RX ORDER — AMOXICILLIN 250 MG
2 CAPSULE ORAL 2 TIMES DAILY
Status: DISCONTINUED | OUTPATIENT
Start: 2018-10-15 | End: 2018-10-19 | Stop reason: HOSPADM

## 2018-10-15 RX ORDER — ACETAMINOPHEN 325 MG/1
975 TABLET ORAL ONCE
Status: DISCONTINUED | OUTPATIENT
Start: 2018-10-15 | End: 2018-10-15 | Stop reason: HOSPADM

## 2018-10-15 RX ORDER — OXYCODONE HYDROCHLORIDE 5 MG/1
10-15 TABLET ORAL
Status: DISCONTINUED | OUTPATIENT
Start: 2018-10-15 | End: 2018-10-16

## 2018-10-15 RX ORDER — ACETAMINOPHEN 325 MG/1
650 TABLET ORAL EVERY 4 HOURS PRN
Status: DISCONTINUED | OUTPATIENT
Start: 2018-10-18 | End: 2018-10-19 | Stop reason: HOSPADM

## 2018-10-15 RX ORDER — LIDOCAINE 40 MG/G
CREAM TOPICAL
Status: DISCONTINUED | OUTPATIENT
Start: 2018-10-15 | End: 2018-10-19 | Stop reason: HOSPADM

## 2018-10-15 RX ORDER — LIDOCAINE 40 MG/G
CREAM TOPICAL
Status: DISCONTINUED | OUTPATIENT
Start: 2018-10-15 | End: 2018-10-15 | Stop reason: HOSPADM

## 2018-10-15 RX ORDER — PROPOFOL 10 MG/ML
25-150 INJECTION, EMULSION INTRAVENOUS CONTINUOUS
Status: DISCONTINUED | OUTPATIENT
Start: 2018-10-15 | End: 2018-10-15 | Stop reason: HOSPADM

## 2018-10-15 RX ORDER — SODIUM CHLORIDE 9 MG/ML
INJECTION, SOLUTION INTRAVENOUS CONTINUOUS PRN
Status: DISCONTINUED | OUTPATIENT
Start: 2018-10-15 | End: 2018-10-15

## 2018-10-15 RX ORDER — ACETAMINOPHEN 325 MG/1
975 TABLET ORAL ONCE
Status: COMPLETED | OUTPATIENT
Start: 2018-10-15 | End: 2018-10-15

## 2018-10-15 RX ORDER — CHOLECALCIFEROL (VITAMIN D3) 1250 MCG
50000 CAPSULE ORAL WEEKLY
Status: DISCONTINUED | OUTPATIENT
Start: 2018-10-17 | End: 2018-10-19 | Stop reason: HOSPADM

## 2018-10-15 RX ADMIN — POLYETHYLENE GLYCOL 3350 17 G: 17 POWDER, FOR SOLUTION ORAL at 17:57

## 2018-10-15 RX ADMIN — PROPOFOL 150 MCG/KG/MIN: 10 INJECTION, EMULSION INTRAVENOUS at 10:42

## 2018-10-15 RX ADMIN — HYDROMORPHONE HYDROCHLORIDE 0.5 MG: 2 INJECTION, SOLUTION INTRAMUSCULAR; INTRAVENOUS; SUBCUTANEOUS at 15:49

## 2018-10-15 RX ADMIN — PHENYLEPHRINE HYDROCHLORIDE 50 MCG: 10 INJECTION, SOLUTION INTRAMUSCULAR; INTRAVENOUS; SUBCUTANEOUS at 10:42

## 2018-10-15 RX ADMIN — LIDOCAINE HYDROCHLORIDE 100 MG: 20 INJECTION, SOLUTION INFILTRATION; PERINEURAL at 07:47

## 2018-10-15 RX ADMIN — Medication 0.5 MG: at 20:19

## 2018-10-15 RX ADMIN — OXYCODONE HYDROCHLORIDE 15 MG: 5 TABLET ORAL at 22:59

## 2018-10-15 RX ADMIN — SENNOSIDES AND DOCUSATE SODIUM 1 TABLET: 8.6; 5 TABLET ORAL at 19:17

## 2018-10-15 RX ADMIN — PHENYLEPHRINE HYDROCHLORIDE 0.2 MCG/KG/MIN: 10 INJECTION, SOLUTION INTRAMUSCULAR; INTRAVENOUS; SUBCUTANEOUS at 10:51

## 2018-10-15 RX ADMIN — FENTANYL CITRATE 75 MCG: 50 INJECTION, SOLUTION INTRAMUSCULAR; INTRAVENOUS at 08:37

## 2018-10-15 RX ADMIN — OXYCODONE HYDROCHLORIDE 15 MG: 5 TABLET ORAL at 19:17

## 2018-10-15 RX ADMIN — ROCURONIUM BROMIDE 20 MG: 10 INJECTION INTRAVENOUS at 13:02

## 2018-10-15 RX ADMIN — Medication 16.9 MG: at 08:43

## 2018-10-15 RX ADMIN — ACETAMINOPHEN 975 MG: 325 TABLET, FILM COATED ORAL at 06:30

## 2018-10-15 RX ADMIN — PROPOFOL 100 MCG/KG/MIN: 10 INJECTION, EMULSION INTRAVENOUS at 08:02

## 2018-10-15 RX ADMIN — SODIUM CHLORIDE: 9 INJECTION, SOLUTION INTRAVENOUS at 17:40

## 2018-10-15 RX ADMIN — HYDROXYZINE HYDROCHLORIDE 25 MG: 25 TABLET ORAL at 21:32

## 2018-10-15 RX ADMIN — HYDROMORPHONE HYDROCHLORIDE 0.5 MG: 2 INJECTION, SOLUTION INTRAMUSCULAR; INTRAVENOUS; SUBCUTANEOUS at 15:59

## 2018-10-15 RX ADMIN — ROCURONIUM BROMIDE 20 MG: 10 INJECTION INTRAVENOUS at 08:55

## 2018-10-15 RX ADMIN — SODIUM CHLORIDE: 9 INJECTION, SOLUTION INTRAVENOUS at 11:42

## 2018-10-15 RX ADMIN — GABAPENTIN 300 MG: 300 CAPSULE ORAL at 06:33

## 2018-10-15 RX ADMIN — ROCURONIUM BROMIDE 20 MG: 10 INJECTION INTRAVENOUS at 09:36

## 2018-10-15 RX ADMIN — FENTANYL CITRATE 50 MCG: 50 INJECTION, SOLUTION INTRAMUSCULAR; INTRAVENOUS at 09:05

## 2018-10-15 RX ADMIN — DIAZEPAM 5 MG: 5 TABLET ORAL at 18:13

## 2018-10-15 RX ADMIN — LIDOCAINE HYDROCHLORIDE 1 MG/KG/HR: 20 INJECTION, SOLUTION INTRAVENOUS at 09:07

## 2018-10-15 RX ADMIN — Medication 2 G: at 08:41

## 2018-10-15 RX ADMIN — PROPOFOL 100 MG: 10 INJECTION, EMULSION INTRAVENOUS at 08:37

## 2018-10-15 RX ADMIN — METHOCARBAMOL 750 MG: 750 TABLET ORAL at 20:56

## 2018-10-15 RX ADMIN — ALBUMIN HUMAN: 0.05 INJECTION, SOLUTION INTRAVENOUS at 10:35

## 2018-10-15 RX ADMIN — SODIUM CHLORIDE, SODIUM GLUCONATE, SODIUM ACETATE, POTASSIUM CHLORIDE AND MAGNESIUM CHLORIDE: 526; 502; 368; 37; 30 INJECTION, SOLUTION INTRAVENOUS at 08:22

## 2018-10-15 RX ADMIN — FENTANYL CITRATE 100 MCG: 50 INJECTION, SOLUTION INTRAMUSCULAR; INTRAVENOUS at 07:47

## 2018-10-15 RX ADMIN — CEFAZOLIN SODIUM 2 G: 2 INJECTION, SOLUTION INTRAVENOUS at 08:30

## 2018-10-15 RX ADMIN — ACETAMINOPHEN 975 MG: 325 TABLET, FILM COATED ORAL at 17:55

## 2018-10-15 RX ADMIN — Medication 0.5 MG: at 22:33

## 2018-10-15 RX ADMIN — PROPOFOL: 10 INJECTION, EMULSION INTRAVENOUS at 09:43

## 2018-10-15 RX ADMIN — MIDAZOLAM 2 MG: 1 INJECTION INTRAMUSCULAR; INTRAVENOUS at 07:42

## 2018-10-15 RX ADMIN — Medication 0.2 MG: at 18:13

## 2018-10-15 RX ADMIN — CEFAZOLIN 1 G: 1 INJECTION, POWDER, FOR SOLUTION INTRAMUSCULAR; INTRAVENOUS at 19:17

## 2018-10-15 RX ADMIN — HYDROMORPHONE HYDROCHLORIDE 0.5 MG: 2 INJECTION, SOLUTION INTRAMUSCULAR; INTRAVENOUS; SUBCUTANEOUS at 15:53

## 2018-10-15 RX ADMIN — CEFAZOLIN 1 G: 1 INJECTION, POWDER, FOR SOLUTION INTRAMUSCULAR; INTRAVENOUS at 10:38

## 2018-10-15 RX ADMIN — CEFAZOLIN 1 G: 1 INJECTION, POWDER, FOR SOLUTION INTRAMUSCULAR; INTRAVENOUS at 12:36

## 2018-10-15 RX ADMIN — PHENYLEPHRINE HYDROCHLORIDE 50 MCG: 10 INJECTION, SOLUTION INTRAMUSCULAR; INTRAVENOUS; SUBCUTANEOUS at 10:50

## 2018-10-15 RX ADMIN — SODIUM CHLORIDE, POTASSIUM CHLORIDE, SODIUM LACTATE AND CALCIUM CHLORIDE: 600; 310; 30; 20 INJECTION, SOLUTION INTRAVENOUS at 07:41

## 2018-10-15 RX ADMIN — SODIUM CHLORIDE 2.54 G: 9 INJECTION, SOLUTION INTRAVENOUS at 08:15

## 2018-10-15 RX ADMIN — FENTANYL CITRATE 50 MCG: 50 INJECTION, SOLUTION INTRAMUSCULAR; INTRAVENOUS at 14:49

## 2018-10-15 RX ADMIN — PROPOFOL 100 MG: 10 INJECTION, EMULSION INTRAVENOUS at 07:50

## 2018-10-15 RX ADMIN — PHENYLEPHRINE HYDROCHLORIDE: 10 INJECTION, SOLUTION INTRAMUSCULAR; INTRAVENOUS; SUBCUTANEOUS at 11:55

## 2018-10-15 RX ADMIN — FENTANYL CITRATE 25 MCG: 50 INJECTION, SOLUTION INTRAMUSCULAR; INTRAVENOUS at 08:56

## 2018-10-15 RX ADMIN — ROCURONIUM BROMIDE 30 MG: 10 INJECTION INTRAVENOUS at 09:03

## 2018-10-15 RX ADMIN — PROPOFOL 300 MG: 10 INJECTION, EMULSION INTRAVENOUS at 07:47

## 2018-10-15 RX ADMIN — ALBUMIN HUMAN: 0.05 INJECTION, SOLUTION INTRAVENOUS at 08:57

## 2018-10-15 RX ADMIN — SODIUM CHLORIDE 10 MG/KG/HR: 0.9 INJECTION, SOLUTION INTRAVENOUS at 08:49

## 2018-10-15 RX ADMIN — PROPOFOL: 10 INJECTION, EMULSION INTRAVENOUS at 12:17

## 2018-10-15 RX ADMIN — ROCURONIUM BROMIDE 50 MG: 10 INJECTION INTRAVENOUS at 07:47

## 2018-10-15 RX ADMIN — PROPOFOL: 10 INJECTION, EMULSION INTRAVENOUS at 10:49

## 2018-10-15 RX ADMIN — ONDANSETRON 4 MG: 2 INJECTION INTRAMUSCULAR; INTRAVENOUS at 12:49

## 2018-10-15 RX ADMIN — KETAMINE HYDROCHLORIDE 10 MG/HR: 100 INJECTION, SOLUTION, CONCENTRATE INTRAMUSCULAR; INTRAVENOUS at 08:39

## 2018-10-15 RX ADMIN — SUGAMMADEX 200 MG: 100 INJECTION, SOLUTION INTRAVENOUS at 14:25

## 2018-10-15 RX ADMIN — Medication 0.3 MG: at 17:51

## 2018-10-15 RX ADMIN — HYDROMORPHONE HYDROCHLORIDE 0.5 MG: 2 INJECTION, SOLUTION INTRAMUSCULAR; INTRAVENOUS; SUBCUTANEOUS at 16:04

## 2018-10-15 RX ADMIN — SUFENTANIL CITRATE 0.2 MCG/KG/HR: 50 INJECTION EPIDURAL; INTRAVENOUS at 08:39

## 2018-10-15 ASSESSMENT — ACTIVITIES OF DAILY LIVING (ADL): ADLS_ACUITY_SCORE: 9

## 2018-10-15 NOTE — BRIEF OP NOTE
[facility], [place]    Brief Operative Note    Pre-operative diagnosis: Scoliosis   Post-operative diagnosis * No post-op diagnosis entered *  Procedure: Procedure(s):  Posterior Spinal Fusion Thoracic 1/2- Thoracic 12, Camargo Lua Osteotomies Thoracic 2/3- Thoracic 10 - Wound Class: I-Clean  Surgeon: Surgeon(s) and Role:     * Patrick Xavier MD - Primary     * Geo Norton MD - Resident - Assisting  Anesthesia: General   Estimated blood loss: 600 ml - 450 ml returned by CellSaver  Drains: Hemovac  Specimens: * No specimens in log *  Findings:   See dictated report.  Complications: None.  Implants: See dictated report.    Assessment and Plan: Jonathon Conde is a 27 year old male with PMH including thoracic levoconvex scoliosis now s/p T2-T10 PISF and SPO on 10/15/18 with Dr. Xavier.     Ortho Spine Primary  Activity: Up with assist until independent. No excessive bending or twisting. No lifting >10 lbs x 6 weeks. No Scout lift for transfers.   Weight bearing status: WBAT.  Pain management: Transition from IV to PO as tolerated.    Antibiotics: Ancef x 24 hours.  Diet: Begin with clear fluids and progress diet as tolerated.   DVT prophylaxis: SCDs only. No chemical DVT ppx needed at discharge.  Imaging: XR Upright Full Spine PTDC - ordered.  Labs: Hgb POD#1 and 2.  Bracing/Splinting: Soft cervical collar for comfort.  Dressings: Keep Aquacel c/d/i x 7 days.  Drains: Document output per shift, will be discontinued at Orthopedic Surgery discretion.  Portillo catheter: Remove POD#1.   Physical Therapy/Occupational Therapy: Eval and treat.  Consults: Hospitalist.  Follow-up: Clinic with Dr. Xavier in 6 weeks. Full spine standing x-rays needed.   Disposition: Pending progress with therapies, pain control on orals, and medical stability, anticipate discharge to home on POD #3-4.    Geo Norton  Orthopaedic PGY4  Pager: [phone]    For questions about this  patient, please attempt to contact me at my pager prior to contacting the Orthopaedic Surgery resident on call. Thank you!

## 2018-10-15 NOTE — IP AVS SNAPSHOT
MRN:[id number]                      After Visit Summary   10/15/2018    Jonathon Conde    MRN: [id number]           Thank you!     Thank you for choosing [facility] for your care. Our goal is always to provide you with excellent care. Hearing back from our patients is one way we can continue to improve our services. Please take a few minutes to complete the written survey that you may receive in the mail after you visit with us. Thank you!        Patient Information     Date Of Birth          1990        Designated Caregiver       Most Recent Value    Caregiver    Will someone help with your care after discharge? yes    Name of designated caregiver mazin    Phone number of caregiver [phone]    Caregiver address [facility]      About your hospital stay     You were admitted on:  October 15, 2018 You last received care in the:  [facility]    You were discharged on:  October 19, 2018        Reason for your hospital stay       Scoliosis surgery                  Who to Call     For medical emergencies, please call 911.  For non-urgent questions about your medical care, please call your primary care provider or clinic, [phone]  For questions related to your surgery, please call your surgery clinic        Attending Provider     Provider Specialty    Patrick Xavier MD Orthopedics       Primary Care Provider Office Phone # Fax #    Kyara Denise Vallejo [phone]      After Care Instructions     Discharge Instructions       Postoperative Instructions - Scoliosis correction and spinal fusion        [address] [address]     You have had a spinal fusion. You have a dressing called Aquacel on your incision which can be worn for up to 7 days, and it can be worn in the shower. After the Aquacel has been removed, you do not need a dressing. There are steri strips directly over the incision. Those may stay  in place until they fall off, which can take up to 2 weeks. It is okay to shower and wash gently with soap and water. Do not soak in the bath. No pools, hot tubs, or lakes for 6 weeks.     For postoperative pain control, we have prescribed a narcotic medication.  This should be taken for the first few weeks following surgery, but as soon as you are able, transition to an over-the-counter type medication such as Tylenol.  You may not take non-steroidal anti-inflammatory medications (eg: Advil, Ibuprofen, Aleve, others) for the first 3 months after surgery as it interferes with bone healing. While taking the narcotic medication, there are several precautions that you must adhere to. These medications have numerous side effects including nausea, constipation, and drowsiness. If you experience nausea, this may be relieved by taking the medication with food or a light meal. To avoid constipation, please use an over-the-counter stool softener or drink lots of water and eat fruits and vegetables. Avoid operating heavy machinery or driving an automobile while on narcotic medications.      You will be seen in the clinic at 6 weeks following surgery. You will not need to attend physical therapy during this time. You can focus on cardiovascular fitness by walking as much as you can tolerate. Avoid bending, lifting, and twisting. Your weight lifting restriction is 10 pounds until your first follow-up appointment.      When you get home, you may resume your normal diet as tolerated. You may not be very hungry but try to eat small healthy meals to help you heal. Remember to drink plenty of water/fluids to help keep you hydrated.    After surgery, you may have a sensitive scar.  When the dressing has been removed, you may massage the scar to decrease sensitivity and help break down scar tissue. Do this up to 4 times per day.    Call our clinic ([phone] during regular office hours) IF:  Fevers (temperature greater than 100.4  degrees Fahrenheit)  Pain that is getting worse or does not respond to pain medications  Drainage from your wound  Increasing redness about the wound  Any other worrisome symptoms     After regular office hours, call [phone] for the on-call resident MD for questions.   RESIDENT DOES NOT HAVE ABILITY TO PRESCRIBE NARCOTICS.    Call your primary doctor or seek medical care if you have any of the following: temperature greater than 101.4, chest pain, increased shortness of breath, nausea or vomiting.                  Follow-up Appointments     Follow Up and recommended labs and tests       Follow up on 11/29/18 with Dr. Xavier at [facility] ([address] [place] [address]). Call [phone] to schedule a follow-up appointment at this location with your provider.                  Your next 10 appointments already scheduled     Nov 29, 2018  2:45 PM CST   (Arrive by 2:15 PM)   RETURN SCOLIOSIS with Patrick Xavier MD   [facility] ([facility])    [address] [place] [address] [phone]            Jan 09, 2019  2:30 PM CST   (Arrive by 2:00 PM)   RETURN SCOLIOSIS with Patrick Xavier MD   [facility] ([facility])    [address] [place] [address] [phone]              Additional Services     Physical Therapy Referral       If you have not heard from the scheduling office within 2 business days, please call [phone] for all locations, with the exception of [place], please call [phone] and [facility] [place], please call [phone].    Please be aware that coverage of these services is subject to the terms and limitations of your health insurance plan.  Call member services at your health plan with any benefit or coverage questions.                  Pending Results     No orders found from 10/13/2018 to 10/16/2018.           "  Statement of Approval     Ordered          10/19/18 0933  I have reviewed and agree with all the recommendations and orders detailed in this document.  EFFECTIVE NOW     Approved and electronically signed by:  Geo Norton MD             Admission Information     Date & Time Provider Department Dept. Phone    10/15/2018 Patrick Xavier MD [facility] [phone]      Your Vitals Were     Blood Pressure Pulse Temperature Respirations Height Weight    151/97 (BP Location: Left arm) 102 98.5  F (36.9  C) (Oral) 18 1.905 m (6' 3\") 86.8 kg (191 lb 4.8 oz)    Pulse Oximetry BMI (Body Mass Index)                94% 23.91 kg/m2          MyChart Information     [employer] gives you secure access to your electronic health record. If you see a primary care provider, you can also send messages to your care team and make appointments. If you have questions, please call your primary care clinic.  If you do not have a primary care provider, please call [phone] and they will assist you.        Care EveryWhere ID     This is your Care EveryWhere ID. This could be used by other organizations to access your [facility] medical records  [id number]        Equal Access to Services     ANALI MILLIGAN AH: Hadii lucero Dumont, waaxda lueliadaha, qaybta kaalmada pepe, wyatt almonte. So [facility] [phone].    ATENCIÓN: Si habla español, tiene a perrin disposición servicios gratuitos de asistencia lingüística. Nafisa al [phone].    We comply with applicable federal civil rights laws and [place] laws. We do not discriminate on the basis of race, color, national origin, age, disability, sex, sexual orientation, or gender identity.               Review of your medicines      START taking        Dose / Directions    diazepam 5 MG tablet   Commonly known as:  VALIUM        Dose:  5 mg   Take 1 tablet (5 mg) by mouth every 6 hours as needed for muscle spasms   Quantity:  50 tablet "   Refills:  0       gabapentin 300 MG capsule   Commonly known as:  NEURONTIN        Dose:  300 mg   Take 1 capsule (300 mg) by mouth every 8 hours   Quantity:  90 capsule   Refills:  0       hydrOXYzine 25 MG tablet   Commonly known as:  ATARAX        Dose:  25 mg   Take 1 tablet (25 mg) by mouth every 6 hours as needed (adjuvant pain and muscle spasms)   Quantity:  60 tablet   Refills:  0       Lidocaine 4 % Patch   Commonly known as:  LIDOCARE        Dose:  2 patch   Place 2 patches onto the skin every 24 hours   Quantity:  30 patch   Refills:  1       menthol 5 % Ptch   Commonly known as:  ICY HOT        Dose:  1 patch   Apply 1 patch topically every 8 hours as needed for muscle soreness   Quantity:  15 each   Refills:  1       oxyCODONE IR 10 MG tablet   Commonly known as:  ROXICODONE   Used for:  History of thoracic spinal fusion        Dose:  10-15 mg   Take 1-1.5 tablets (10-15 mg) by mouth every 4 hours as needed for moderate to severe pain   Quantity:  70 tablet   Refills:  0       polyethylene glycol Packet   Commonly known as:  MIRALAX/GLYCOLAX        Dose:  17 g   Start taking on:  10/20/2018   Take 17 g by mouth daily   Quantity:  24 packet   Refills:  0       senna-docusate 8.6-50 MG per tablet   Commonly known as:  SENOKOT-S;PERICOLACE        Dose:  1 tablet   Take 1 tablet by mouth 2 times daily   Quantity:  30 tablet   Refills:  0         CONTINUE these medicines which have NOT CHANGED        Dose / Directions    Cholecalciferol 67781 units Tabs   Commonly known as:  DIALYVITE VITAMIN D3 MAX   Used for:  Vitamin D deficiency        Dose:  37802 Units   Take 50,000 Units by mouth once a week For one month   Quantity:  4 tablet   Refills:  0            Where to get your medicines      These medications were sent to [facility] [place], [place] - [address]  [address] [address]     Phone:  [phone]     gabapentin 300 MG capsule    hydrOXYzine 25 MG  tablet    Lidocaine 4 % Patch    menthol 5 % Ptch    polyethylene glycol Packet    senna-docusate 8.6-50 MG per tablet         Some of these will need a paper prescription and others can be bought over the counter. Ask your nurse if you have questions.     Bring a paper prescription for each of these medications     diazepam 5 MG tablet    oxyCODONE IR 10 MG tablet                Protect others around you: Learn how to safely use, store and throw away your medicines at www.disposemymeds.org.        Information about OPIOIDS     PRESCRIPTION OPIOIDS: WHAT YOU NEED TO KNOW   We gave you an opioid (narcotic) pain medicine. It is important to manage your pain, but opioids are not always the best choice. You should first try all the other options your care team gave you. Take this medicine for as short a time (and as few doses) as possible.    Some activities can increase your pain, such as bandage changes or therapy sessions. It may help to take your pain medicine 30 to 60 minutes before these activities. Reduce your stress by getting enough sleep, working on hobbies you enjoy and practicing relaxation or meditation. Talk to your care team about ways to manage your pain beyond prescription opioids.    These medicines have risks:    DO NOT drive when on new or higher doses of pain medicine. These medicines can affect your alertness and reaction times, and you could be arrested for driving under the influence (DUI). If you need to use opioids long-term, talk to your care team about driving.    DO NOT operate heavy machinery    DO NOT do any other dangerous activities while taking these medicines.    DO NOT drink any alcohol while taking these medicines.     If the opioid prescribed includes acetaminophen, DO NOT take with any other medicines that contain acetaminophen. Read all labels carefully. Look for the word  acetaminophen  or  Tylenol.  Ask your pharmacist if you have questions or are unsure.    You can get addicted  to pain medicines, especially if you have a history of addiction (chemical, alcohol or substance dependence). Talk to your care team about ways to reduce this risk.    All opioids tend to cause constipation. Drink plenty of water and eat foods that have a lot of fiber, such as fruits, vegetables, prune juice, apple juice and high-fiber cereal. Take a laxative (Miralax, milk of magnesia, Colace, Senna) if you don t move your bowels at least every other day. Other side effects include upset stomach, sleepiness, dizziness, throwing up, tolerance (needing more of the medicine to have the same effect), physical dependence and slowed breathing.    Store your pills in a secure place, locked if possible. We will not replace any lost or stolen medicine. If you don t finish your medicine, please throw away (dispose) as directed by your pharmacist. The [place] Pollution Control Agency has more information about safe disposal: https://www.pca.[facility].[place].us/living-green/managing-unwanted-medications             Medication List: This is a list of all your medications and when to take them. Check marks below indicate your daily home schedule. Keep this list as a reference.      Medications           Morning Afternoon Evening Bedtime As Needed    Cholecalciferol 24646 units Tabs   Commonly known as:  DIALYVITE VITAMIN D3 MAX   Take 50,000 Units by mouth once a week For one month                                   diazepam 5 MG tablet   Commonly known as:  VALIUM   Take 1 tablet (5 mg) by mouth every 6 hours as needed for muscle spasms   Last time this was given:  5 mg on 10/19/2018  8:18 AM                                   gabapentin 300 MG capsule   Commonly known as:  NEURONTIN   Take 1 capsule (300 mg) by mouth every 8 hours   Last time this was given:  300 mg on 10/19/2018 12:05 PM            0600       1400           2200           hydrOXYzine 25 MG tablet   Commonly known as:  ATARAX   Take 1 tablet (25 mg) by mouth every 6  hours as needed (adjuvant pain and muscle spasms)   Last time this was given:  25 mg on 10/19/2018 12:05 AM                                   Lidocaine 4 % Patch   Commonly known as:  LIDOCARE   Place 2 patches onto the skin every 24 hours   Last time this was given:  2 patches on 10/19/2018  8:27 AM                         On for 12 hr- 8am,     Off for 12 hrs- 8pm       menthol 5 % Ptch   Commonly known as:  ICY HOT   Apply 1 patch topically every 8 hours as needed for muscle soreness   Last time this was given:  1 patch on 10/17/2018  2:05 AM                                   oxyCODONE IR 10 MG tablet   Commonly known as:  ROXICODONE   Take 1-1.5 tablets (10-15 mg) by mouth every 4 hours as needed for moderate to severe pain   Last time this was given:  10 mg on 10/19/2018 12:06 PM                                   polyethylene glycol Packet   Commonly known as:  MIRALAX/GLYCOLAX   Take 17 g by mouth daily   Start taking on:  10/20/2018   Last time this was given:  17 g on 10/19/2018  8:18 AM                                   senna-docusate 8.6-50 MG per tablet   Commonly known as:  SENOKOT-S;PERICOLACE   Take 1 tablet by mouth 2 times daily   Last time this was given:  2 tablets on 10/19/2018  8:18 AM

## 2018-10-15 NOTE — IP AVS SNAPSHOT
[facility]    [address] [place] [address]    Phone:  [phone]                                       After Visit Summary   10/15/2018    Jonathon Conde    MRN: [id number]           After Visit Summary Signature Page     I have received my discharge instructions, and my questions have been answered. I have discussed any challenges I see with this plan with the nurse or doctor.    ..........................................................................................................................................  Patient/Patient Representative Signature      ..........................................................................................................................................  Patient Representative Print Name and Relationship to Patient    ..................................................               ................................................  Date                                   Time    ..........................................................................................................................................  Reviewed by Signature/Title    ...................................................              ..............................................  Date                                               Time          22EPIC Rev 08/18

## 2018-10-15 NOTE — PROGRESS NOTES
"Orthopaedic Surgery Progress Note   October 15, 2018    Subjective: No acute events overnight. Pain well controlled. Tolerating diet. Voiding spontaneously. +Flatus, no BM. Denies fever or chills, CP, SOB, numbness or tingling, motor dysfunction or weakness.     Objective: /86  Pulse 110  Temp 98.1  F (36.7  C) (Axillary)  Resp 18  Ht 1.905 m (6' 3\")  Wt 84.8 kg (186 lb 15.2 oz)  SpO2 100%  BMI 23.37 kg/m2    Drain(s): NR    General: NAD, alert and oriented, cooperative with exam.   Cardio: RRR, extremities wwp.   Respiratory: Non-labored breathing.  MSK: Dressing c/d/i. HV in place, serosanguinous output. Radial, DP and PT pulses 2+. SILT C5-C8 and L3-S1 bilaterally.   C3-5: Easy diaphragmatic respirations without utilization of accessory muscles    C6: Biceps R and L 5/5 strength    C7: Triceps R and L 5/5 strength    C8:  R and L 5/5 strength    T1: Dorsal interossei R and L 5/5 strength          --------------------------------   L2-3: Hip flexion L and R 5/5 strength    L4:  Knee extension L and R 5/5 strength   L5:  Foot / EHL dorsiflexion L and R 5/5 strength   S1:  Plantarflexion  L and R 5/5 strength    Labs:     Recent Labs  Lab 10/15/18  1213 10/15/18  0853   HGB 13.2* 14.0     --        Recent Labs  Lab 10/15/18  1213 10/15/18  0853    136   POTASSIUM 4.0 3.5   * 113*       Recent Labs  Lab 10/15/18  1213   INR 1.15*   PTT 29       Imaging: pending    Assessment and Plan: Jonathon Conde is a 27 year old male with PMH including thoracic levoconvex scoliosis now s/p T2-T10 PISF and SPO on 10/15/18 with Dr. Xavier.      Ortho Spine Primary  Activity: Up with assist until independent. No excessive bending or twisting. No lifting >10 lbs x 6 weeks. No Scout lift for transfers.   Weight bearing status: WBAT.  Pain management: Transition from IV to PO as tolerated.    Antibiotics: Ancef x 24 hours.  Diet: Begin with clear fluids and progress diet as tolerated.   DVT " prophylaxis: SCDs only. No chemical DVT ppx needed at discharge.  Imaging: XR Upright Full Spine PTDC - ordered.  Labs: Hgb POD#1 and 2.  Bracing/Splinting: Soft cervical collar for comfort.  Dressings: Keep Aquacel c/d/i x 7 days.  Drains: Document output per shift, will be discontinued at Orthopedic Surgery discretion.  Portillo catheter: Remove POD#1.   Physical Therapy/Occupational Therapy: Eval and treat.  Consults: Hospitalist.  Follow-up: Clinic with Dr. Xavier in 6 weeks. Full spine standing x-rays needed.   Disposition: Pending progress with therapies, pain control on orals, and medical stability, anticipate discharge to home on POD #3-4.  Future Appointments  Date Time Provider Department Center   10/16/2018 9:15 AM Katharina Jaime, PT [facility] [place]   10/16/2018 3:00 PM Katharina Jaime, PT [facility] [place]   10/17/2018 7:30 AM Chiquis Lockett OT [facility] [place]   11/29/2018 2:45 PM Patrick Xavier MD [facility]   1/9/2019 2:30 PM Patrick Xavier MD [facility]      [facility]  Orthopaedic PGY4  Pager: [phone]    For questions about this patient, please attempt to contact me at my pager prior to contacting the Orthopaedic Surgery resident on call. Thank you!

## 2018-10-15 NOTE — ANESTHESIA POSTPROCEDURE EVALUATION
Patient: Jonathon Conde    Procedure(s):  Posterior Spinal Fusion Thoracic 1/2- Thoracic 12, Camargo Lua Osteotomies Thoracic 2/3- Thoracic 10 - Wound Class: I-Clean    Diagnosis:Scoliosis   Diagnosis Additional Information: No value filed.    Anesthesia Type:  General, ETT    Note:  Anesthesia Post Evaluation    Patient location during evaluation: PACU  Patient participation: Able to fully participate in evaluation  Level of consciousness: awake  Pain management: adequate  Airway patency: patent  Cardiovascular status: acceptable  Respiratory status: acceptable  Hydration status: acceptable  PONV: none             Last vitals:  Vitals:    10/15/18 1530 10/15/18 1545 10/15/18 1600   BP: 111/86 141/88 (!) 124/98   Pulse:      Resp: (!) 5 11 13   Temp:      SpO2: 100% 99% 96%         Electronically Signed By: Cheko Piña DO  October 15, 2018  4:21 PM

## 2018-10-15 NOTE — LETTER
Transition Communication Hand-off for Care Transitions to Next Level of Care Provider    Name: Jonathon Conde  : 1990  MRN #: [id number]  Primary Care Provider: Kyara Vallejo     Primary Clinic: [address] [place] [id number]     Reason for Hospitalization:  Scoliosis   Scoliosis deformity of spine  Admit Date/Time: 10/15/2018  5:38 AM  Discharge Date:10/18/18  Payor Source: Payor: UC[employer] / Plan: [employer] CHOICES OPEN ACCESS / Product Type: HMO /            Reason for Communication Hand-off Referral: Notify of admit and discharge plans    Discharge Plan: Home with OP PT     Concern for non-adherence with plan of care:   Y/N No  Discharge Needs Assessment:  Needs       Most Recent Value    Equipment Currently Used at Home none    Transportation Available family or friend will provide              Follow-up plan:  Future Appointments  Date Time Provider Department Center   10/18/2018 4:00 PM Denise Patrick, PT [facility] [place]   2018 2:45 PM [facility]   2019 2:30 PM Patrick [facility]       Any outstanding tests or procedures:        Referrals     Future Labs/Procedures    Physical Therapy Referral     Process Instructions:    Functional Capacity and Work Conditioning are only offered at [facility] and [facility] (service can be provided by PT or OT).    *This therapy referral will be filtered to a centralized scheduling office at [facility] and the patient will receive a call to schedule an appointment at a [place] location most convenient for them. *    Comments:    If you have not heard from the scheduling office within 2 business days, please call [phone] for all locations, with the exception of [place], please call [phone] and [place], please call [phone].    Please be aware that coverage of these services is subject to the terms and limitations of your health insurance plan.  Call member  services at your health plan with any benefit or coverage questions.            Key Recommendations:      Mireille Camargo    AVS/Discharge Summary is the source of truth; this is a helpful guide for improved communication of patient story

## 2018-10-15 NOTE — OR NURSING
PACU to Inpatient Nursing Handoff    Patient Jonathon Conde is a 27 year old male who speaks English.   Procedure Procedure(s):  Posterior Spinal Fusion Thoracic 1/2- Thoracic 12, Camargo Lua Osteotomies Thoracic 2/3- Thoracic 10 - Wound Class: I-Clean   Surgeon(s) Primary: Patrick Xavier MD  Resident - Assisting: Geo Norton MD     Allergies   Allergen Reactions     Milk Protein Extract GI Disturbance     Sulfa Drugs      PN: LW Reaction: rash       Isolation  No active isolations     Past Medical History   has a past medical history of Chronic back pain and Scoliosis.    Anesthesia General   Dermatome Level     Preop Meds acetaminophen (Tylenol) - time given: 0630   Nerve block Not applicable   Intraop Meds fentanyl (Sublimaze): 300 mcg total   Local Meds Yes   Antibiotics cefazolin (Ancef) - last given at 1263     Pain Patient Currently in Pain: yes  Comfort: tolerable with discomfort  Pain Control: partially effective   PACU meds  hydromorphone (Dilaudid): 2 mg (total dose) last given at 1604    PCA / epidural No   Capnography  yes   Telemetry ECG Rhythm: Normal sinus rhythm   Inpatient Telemetry Monitor Ordered? No        Labs Glucose Lab Results   Component Value Date     10/15/2018       Hgb Lab Results   Component Value Date    HGB 13.2 10/15/2018       INR Lab Results   Component Value Date    INR 1.15 10/15/2018      PACU Imaging Not applicable     Wound/Incision Incision/Surgical Site 10/15/18 Midline Back (Active)   Incision Assessment UTV 10/15/2018  3:00 PM   Closure BOB 10/15/2018  3:00 PM   Incision Drainage Amount None 10/15/2018  3:00 PM   Incision Care Adhesive strips applied 10/15/2018  1:14 PM   Dressing Intervention Clean, dry, intact 10/15/2018  2:46 PM   Number of days:0       Incision/Surgical Site 10/15/18 Bilateral Head (Active)   Incision Assessment WDL 10/15/2018  3:00 PM   Closure Other (Comment) 10/15/2018  3:00 PM   Incision Drainage Amount None 10/15/2018   3:00 PM   Dressing Intervention Open to air / No Dressing 10/15/2018  3:00 PM   Number of days:0      CMS Peripheral Neurovascular WDL: WDL;capillary refill general (Group) (10/15/18 1446)  All Extremities Temperature: warm (10/15/18 0627)  All Extremities Color: no discoloration (10/15/18 0627)  LUE Temperature: warm (10/15/18 1446)  LUE Color: no discoloration (10/15/18 1446)  LUE Sensation: no tingling;no numbness (10/15/18 1446)  RUE Temperature: warm (10/15/18 1446)  RUE Color: no discoloration (10/15/18 1446)  RUE Sensation: no tingling;no numbness (10/15/18 1446)  LLE Temperature: warm (10/15/18 1446)  LLE Color: no discoloration (10/15/18 1446)  LLE Sensation: no tingling;no numbness (10/15/18 1446)  RLE Temperature: warm (10/15/18 1446)  RLE Color: no discoloration (10/15/18 1446)  RLE Sensation: no tingling;no numbness (10/15/18 1446)   Equipment    Other LDA       IV Access Peripheral IV 10/15/18 Right Hand (Active)   Site Assessment [facility] 10/15/2018  3:00 PM   Line Status Infusing 10/15/2018  3:00 PM   Phlebitis Scale 0-->no symptoms 10/15/2018  3:00 PM   Infiltration Scale 0 10/15/2018  7:03 AM   Dressing Intervention New dressing  10/15/2018  7:03 AM   Number of days:0       Peripheral IV 10/15/18 Left Lower forearm (Active)   Site Assessment [facility] 10/15/2018  3:00 PM   Line Status Saline locked 10/15/2018  3:00 PM   Phlebitis Scale 0-->no symptoms 10/15/2018  2:46 PM   Number of days:0      Blood Products Not applicable  mL   Intake/Output Date 10/15/18 0700 - 10/16/18 0659   Shift 5769-5720 9038-2561 8444-8478 24 Hour Total   I  N  T  A  K  E   I.V. 950   950    Other 420   420    Colloid 900   900    Shift Total  (mL/kg) 2270  (26.77)   2270  (26.77)   O  U  T  P  U  T   Urine 650   650    Blood 650   650    Shift Total  (mL/kg) 1300  (15.33)   1300  (15.33)   Weight (kg) 84.8 84.8 84.8 84.8        Drains / Portillo Urethral Catheter Latex 16 fr (Active)   Tube Description UT 10/15/2018  2:46 PM    Collection Container Standard 10/15/2018  2:46 PM   Securement Method Securing device (Describe) 10/15/2018  2:46 PM   Rationale for Continued Use Anesthesia 10/15/2018  2:46 PM   Number of days:0      Time of void PreOp      PostOp      Diapered? No   Bladder Scan     PO    ice chips     Vitals    B/P: 120/86  T: 98.1  F (36.7  C)    Temp src: Axillary  P:  Pulse: 110 (10/15/18 0600)    Heart Rate: 70 (10/15/18 1515)     R: 18  O2:  SpO2: 100 %    O2 Device: Simple face mask (10/15/18 1446)    Oxygen Delivery: 5 LPM (10/15/18 1446)         Family/support present mother and significant other   Patient belongings Patient Belongings: clothing;shoes;cell phone/electronics;glasses;wallet  Disposition of Belongings: Locker   Patient transported on cart   DC meds/scripts (obs/outpt) Not applicable   Inpatient Pain Meds Released? Yes       Special needs/considerations None   Tasks needing completion None       Howie Owens RN  ASCOM [id number]

## 2018-10-15 NOTE — OR NURSING
Received pt from OR    Placed to monitor lethargic but follows commands   C/o pain    Medicated CRNA and katamineinfusion continue at 10 mcg/hr (5 ml/Hr)  Per order of Dr Juan R BRITO    Moving all exts on command ,denies any numbness to exts

## 2018-10-15 NOTE — ANESTHESIA CARE TRANSFER NOTE
Patient: Jonathon Conde    Procedure(s):  Posterior Spinal Fusion Thoracic 1/2- Thoracic 12, Camargo Lua Osteotomies Thoracic 2/3- Thoracic 10 - Wound Class: I-Clean    Diagnosis: Scoliosis   Diagnosis Additional Information: No value filed.    Anesthesia Type:   General, ETT     Note:  Airway :Face Mask  Patient transferred to:PACU  Comments: Report to RN, vss, IV and airway patent, awake, states discomfort, additional narcotic givenHandoff Report: Identifed the Patient, Identified the Reponsible Provider, Reviewed the pertinent medical history, Discussed the surgical course, Reviewed Intra-OP anesthesia mangement and issues during anesthesia, Set expectations for post-procedure period and Allowed opportunity for questions and acknowledgement of understanding      Vitals: (Last set prior to Anesthesia Care Transfer)    CRNA VITALS  10/15/2018 1414 - 10/15/2018 1449      10/15/2018             Resp Rate (observed): (!)  1                Electronically Signed By: BECKI Johnson CRNA  October 15, 2018  2:49 PM

## 2018-10-15 NOTE — ANESTHESIA PROCEDURE NOTES
Arterial Line Procedure Note  Staff:     Anesthesiologist:  MARIA L ONEIL  Procedure Start/Stop Times:      10/15/2018 7:55 AM    patient identified, IV checked, site marked, risks and benefits discussed, informed consent, monitors and equipment checked, pre-op evaluation and at physician/surgeon's request      Correct Patient: Yes      Correct Position: Yes      Correct Site: Yes      Correct Procedure: Yes      Correct Laterality:  Yes    Site Marked:  Yes  Line Placement:     Procedure:  Arterial Line    Insertion Site:  Radial    Insertion laterality:  Left    Skin Prep: Chloraprep      Patient Prep: mask, sterile gloves, hat and hand hygiene      Ultrasound Guided?: Yes      Artery evaluated via ultrasound confirming patency.   Using realtime imaging, the artery was punctured and the needle was observed entering the artery.      A permanent image is entered into patient's chart.      Catheter size:  20 gauge, Quick cath    Dressing:  Tegaderm    Complications:  None obvious    Arterial waveform: Yes      IBP within 10% of NIBP: Yes

## 2018-10-16 ENCOUNTER — APPOINTMENT (OUTPATIENT)
Dept: PHYSICAL THERAPY | Facility: CLINIC | Age: 28
DRG: 458 | End: 2018-10-16
Attending: ORTHOPAEDIC SURGERY
Payer: COMMERCIAL

## 2018-10-16 LAB
ANION GAP SERPL CALCULATED.3IONS-SCNC: 4 MMOL/L (ref 3–14)
BUN SERPL-MCNC: 6 MG/DL (ref 7–30)
CALCIUM SERPL-MCNC: 8.4 MG/DL (ref 8.5–10.1)
CHLORIDE SERPL-SCNC: 105 MMOL/L (ref 94–109)
CO2 SERPL-SCNC: 31 MMOL/L (ref 20–32)
CREAT SERPL-MCNC: 1.1 MG/DL (ref 0.66–1.25)
ERYTHROCYTE [DISTWIDTH] IN BLOOD BY AUTOMATED COUNT: 12.3 % (ref 10–15)
GFR SERPL CREATININE-BSD FRML MDRD: 79 ML/MIN/1.7M2
GLUCOSE SERPL-MCNC: 102 MG/DL (ref 70–99)
HCT VFR BLD AUTO: 41.4 % (ref 40–53)
HGB BLD-MCNC: 13.8 G/DL (ref 13.3–17.7)
MCH RBC QN AUTO: 30.5 PG (ref 26.5–33)
MCHC RBC AUTO-ENTMCNC: 33.3 G/DL (ref 31.5–36.5)
MCV RBC AUTO: 92 FL (ref 78–100)
PLATELET # BLD AUTO: 202 10E9/L (ref 150–450)
POTASSIUM SERPL-SCNC: 4.3 MMOL/L (ref 3.4–5.3)
RBC # BLD AUTO: 4.52 10E12/L (ref 4.4–5.9)
SODIUM SERPL-SCNC: 140 MMOL/L (ref 133–144)
WBC # BLD AUTO: 8.5 10E9/L (ref 4–11)

## 2018-10-16 PROCEDURE — 25000132 ZZH RX MED GY IP 250 OP 250 PS 637: Performed by: STUDENT IN AN ORGANIZED HEALTH CARE EDUCATION/TRAINING PROGRAM

## 2018-10-16 PROCEDURE — 36415 COLL VENOUS BLD VENIPUNCTURE: CPT | Performed by: ORTHOPAEDIC SURGERY

## 2018-10-16 PROCEDURE — 25000132 ZZH RX MED GY IP 250 OP 250 PS 637: Performed by: NURSE PRACTITIONER

## 2018-10-16 PROCEDURE — 25000128 H RX IP 250 OP 636: Performed by: STUDENT IN AN ORGANIZED HEALTH CARE EDUCATION/TRAINING PROGRAM

## 2018-10-16 PROCEDURE — 80048 BASIC METABOLIC PNL TOTAL CA: CPT | Performed by: ORTHOPAEDIC SURGERY

## 2018-10-16 PROCEDURE — 85027 COMPLETE CBC AUTOMATED: CPT | Performed by: ORTHOPAEDIC SURGERY

## 2018-10-16 PROCEDURE — 97530 THERAPEUTIC ACTIVITIES: CPT | Mod: GP

## 2018-10-16 PROCEDURE — 40000193 ZZH STATISTIC PT WARD VISIT

## 2018-10-16 PROCEDURE — 99207 ZZC NO CHARGE COORDINATED CARE PS: CPT

## 2018-10-16 PROCEDURE — 97161 PT EVAL LOW COMPLEX 20 MIN: CPT | Mod: GP

## 2018-10-16 PROCEDURE — 12000008 ZZH R&B INTERMEDIATE UMMC

## 2018-10-16 RX ORDER — OXYCODONE HYDROCHLORIDE 10 MG/1
10 TABLET ORAL
Status: DISCONTINUED | OUTPATIENT
Start: 2018-10-16 | End: 2018-10-19 | Stop reason: HOSPADM

## 2018-10-16 RX ORDER — OXYCODONE HYDROCHLORIDE 5 MG/1
5 TABLET ORAL EVERY 6 HOURS PRN
Status: DISCONTINUED | OUTPATIENT
Start: 2018-10-16 | End: 2018-10-16

## 2018-10-16 RX ORDER — GABAPENTIN 300 MG/1
300 CAPSULE ORAL EVERY 8 HOURS
Status: DISCONTINUED | OUTPATIENT
Start: 2018-10-16 | End: 2018-10-19 | Stop reason: HOSPADM

## 2018-10-16 RX ORDER — OXYCODONE HCL 10 MG/1
10 TABLET, FILM COATED, EXTENDED RELEASE ORAL EVERY 12 HOURS
Status: DISCONTINUED | OUTPATIENT
Start: 2018-10-16 | End: 2018-10-16

## 2018-10-16 RX ORDER — ACETAMINOPHEN 325 MG/1
975 TABLET ORAL EVERY 8 HOURS
Status: DISCONTINUED | OUTPATIENT
Start: 2018-10-16 | End: 2018-10-19 | Stop reason: HOSPADM

## 2018-10-16 RX ORDER — MORPHINE SULFATE 15 MG/1
15 TABLET, FILM COATED, EXTENDED RELEASE ORAL EVERY 12 HOURS SCHEDULED
Status: DISCONTINUED | OUTPATIENT
Start: 2018-10-16 | End: 2018-10-19

## 2018-10-16 RX ORDER — OXYCODONE HCL 10 MG/1
10-20 TABLET, FILM COATED, EXTENDED RELEASE ORAL EVERY 12 HOURS PRN
Status: DISCONTINUED | OUTPATIENT
Start: 2018-10-16 | End: 2018-10-16

## 2018-10-16 RX ADMIN — Medication 0.5 MG: at 09:35

## 2018-10-16 RX ADMIN — METHOCARBAMOL 750 MG: 750 TABLET ORAL at 08:26

## 2018-10-16 RX ADMIN — DIAZEPAM 5 MG: 5 TABLET ORAL at 04:19

## 2018-10-16 RX ADMIN — MORPHINE SULFATE 15 MG: 15 TABLET, EXTENDED RELEASE ORAL at 19:15

## 2018-10-16 RX ADMIN — POLYETHYLENE GLYCOL 3350 17 G: 17 POWDER, FOR SOLUTION ORAL at 08:08

## 2018-10-16 RX ADMIN — Medication 0.5 MG: at 11:29

## 2018-10-16 RX ADMIN — OXYCODONE HYDROCHLORIDE 15 MG: 5 TABLET ORAL at 19:14

## 2018-10-16 RX ADMIN — Medication 0.5 MG: at 03:07

## 2018-10-16 RX ADMIN — SODIUM CHLORIDE: 9 INJECTION, SOLUTION INTRAVENOUS at 15:45

## 2018-10-16 RX ADMIN — SENNOSIDES AND DOCUSATE SODIUM 2 TABLET: 8.6; 5 TABLET ORAL at 08:07

## 2018-10-16 RX ADMIN — GABAPENTIN 300 MG: 300 CAPSULE ORAL at 10:26

## 2018-10-16 RX ADMIN — ACETAMINOPHEN 975 MG: 325 TABLET, FILM COATED ORAL at 02:00

## 2018-10-16 RX ADMIN — OXYCODONE HYDROCHLORIDE 15 MG: 5 TABLET ORAL at 22:16

## 2018-10-16 RX ADMIN — LIDOCAINE 2 PATCH: 560 PATCH PERCUTANEOUS; TOPICAL; TRANSDERMAL at 08:08

## 2018-10-16 RX ADMIN — HYDROXYZINE HYDROCHLORIDE 25 MG: 25 TABLET ORAL at 18:01

## 2018-10-16 RX ADMIN — OXYCODONE HYDROCHLORIDE 15 MG: 5 TABLET ORAL at 02:00

## 2018-10-16 RX ADMIN — OXYCODONE HYDROCHLORIDE 15 MG: 5 TABLET ORAL at 13:21

## 2018-10-16 RX ADMIN — SENNOSIDES AND DOCUSATE SODIUM 2 TABLET: 8.6; 5 TABLET ORAL at 19:15

## 2018-10-16 RX ADMIN — ACETAMINOPHEN 975 MG: 325 TABLET, FILM COATED ORAL at 18:00

## 2018-10-16 RX ADMIN — Medication 0.5 MG: at 00:37

## 2018-10-16 RX ADMIN — OXYCODONE HYDROCHLORIDE 15 MG: 5 TABLET ORAL at 06:02

## 2018-10-16 RX ADMIN — DIAZEPAM 5 MG: 5 TABLET ORAL at 14:26

## 2018-10-16 RX ADMIN — Medication 0.5 MG: at 07:00

## 2018-10-16 RX ADMIN — DIAZEPAM 5 MG: 5 TABLET ORAL at 21:06

## 2018-10-16 RX ADMIN — SODIUM CHLORIDE: 9 INJECTION, SOLUTION INTRAVENOUS at 05:46

## 2018-10-16 RX ADMIN — MENTHOL 1 PATCH: 205.5 PATCH TOPICAL at 18:03

## 2018-10-16 RX ADMIN — CEFAZOLIN 1 G: 1 INJECTION, POWDER, FOR SOLUTION INTRAMUSCULAR; INTRAVENOUS at 04:12

## 2018-10-16 RX ADMIN — Medication 0.5 MG: at 15:43

## 2018-10-16 RX ADMIN — OXYCODONE HYDROCHLORIDE 20 MG: 10 TABLET, FILM COATED, EXTENDED RELEASE ORAL at 08:07

## 2018-10-16 RX ADMIN — OXYCODONE HYDROCHLORIDE 10 MG: 10 TABLET ORAL at 10:27

## 2018-10-16 RX ADMIN — GABAPENTIN 300 MG: 300 CAPSULE ORAL at 18:01

## 2018-10-16 RX ADMIN — OXYCODONE HYDROCHLORIDE 15 MG: 5 TABLET ORAL at 16:22

## 2018-10-16 RX ADMIN — ACETAMINOPHEN 975 MG: 325 TABLET, FILM COATED ORAL at 10:27

## 2018-10-16 RX ADMIN — OXYCODONE HYDROCHLORIDE 5 MG: 5 TABLET ORAL at 08:49

## 2018-10-16 ASSESSMENT — ACTIVITIES OF DAILY LIVING (ADL)
ADLS_ACUITY_SCORE: 9

## 2018-10-16 NOTE — PLAN OF CARE
Problem: Patient Care Overview  Goal: Plan of Care/Patient Progress Review    VS:   Stable no abnormalities noted. No reports of SOB or CP. LS clear equal bilaterally, on room air.    Output:   Portillo removed @ 1430, due to void. Voided @ 1800 output 650ccs, PVR 3ccs. Not passing gas yet.    Activity:   Sat @ edge of bed, see PT notes. Assist of 1    Skin: Intact, w/ the exception of incisions/drains/devices   Pain:   Moderate, continue pain regime: oxy 10-15mg q3h, valium 5mg q6h, dilaudid 0.3-0.5mg q2h, Atarax 25mg q6h. Schedule morphine extended release, and gabapentin 300mg. Robaxin does not work for pt.    Neuro/CMS:   Intact, no numbness or tingling reported   Dressing(s):   Posterior back: CDI, no drainage    Diet:   Clear liquid diet, no N&V reported   Equipment:   Soft collar for comfort only   IV's/Drains:   PIV R forearm: infusing NS @ 100mL/hr   Plan:   Continue to monitor    Additional Info:   Icy hot patch in place: lower back  Discontinued IMC/Tele today

## 2018-10-16 NOTE — PLAN OF CARE
"Problem: Patient Care Overview  Goal: Plan of Care/Patient Progress Review  Discharge Planner PT   Patient plan for discharge: Home with assist  Current status: Patient supine in bed upon arrival. States that he is in \"a lot of pain\" but is willing to participate in PT. Completed PT eval and initiated treatment. Performed supine to sit EOB transfer with Balbina, HOB raised, and use of bed rail. Patient stated that movement greatly increased pain in his left shoulder. Sat EOB for 4 min with CGA. Patient able to scoot on side of bed for 6\" with CGA. Performed sit to supine transfer with min A to lift legs back into bed. Max assist of 2 to boost patient in bed. Patient supine in bed at end of session.   Barriers to return to prior living situation: pain, weakness, ability to climb stairs and safety with functional mobility  Recommendations for discharge: home with assist  Rationale for recommendations: patient demonstrates adequate strength to be safe with functional mobility once pain is controlled.        Entered by: Rajni Tidwell 10/16/2018 10:49 AM       2:00 PM PT Session: Patient able to perform sit<>stand with CGA and FWW, able to take 3 small side steps to the left with CGA and FWW.   "

## 2018-10-16 NOTE — OP NOTE
Procedure Date: 10/15/2018      PREOPERATIVE DIAGNOSIS:  Double thoracic scoliosis.      POSTOPERATIVE DIAGNOSIS:  Double thoracic scoliosis.      PROCEDURES:   1.  Posterior spinal fusion T2 to T10.   2.  Segmental spinal instrumentation T2 to T10.   3.  Camargo-Lua osteotomies T3 to T4, T4 to T5, T5 to T6, T6 to 7, T7 to T8, T8 TO t9.   4.  Image-guided surgery.      SURGEON:  Patrick Xavier Jr., MD      ASSISTANT:  Dr. See Norton.      INDICATION FOR OPERATION:  The patient is an adult male with a history of a double thoracic scoliotic deformity with pain in his upper thoracic region that is unacceptable to him.  His SRS questionnaire score was 52.  Normally for someone of his age and otherwise healthy, ought to be 80+, and he had a visual analog pain scale of a 7.  He has undergone previous imaging which showed a major double thoracic curve pattern with a proximal thoracic curve of approximately 55 degrees and the lower thoracic curve being approximately 50 degrees.  He had substantial obliquity at T1 and T2.  A myelo-CT had been done which showed no occult intraspinal anomalies.  It did show very diminutive pedicles in the concavity of the proximal thoracic curve which was as expected.  The risks, benefits, alternative treatments, and expected outcomes have been extensively discussed with the patient on several occasions and it was his desire to proceed with surgical treatment.      DESCRIPTION OF PROCEDURE:  The OR team was briefed about the plan.  The patient was brought to the operating room and underwent the induction of adequate general anesthesia, had a Portillo catheter placed along with appropriate lines.  He then had Devlin-Wells tongs placed and then was turned in position prone on a Yaakov table 4-poster frame. Ten pounds of weight was applied to the Devlin-Wells tongs.  Baseline neuro monitoring tracings were obtained and were evaluable.      A timeout was done confirming the site and type of  surgery.  He did receive prophylactic antibiotics and tranexamic acid.      The spine was then exposed and intraoperative imaging was used to confirm the appropriate level of dissection.  Once the exposure was complete, a spinous process tracking arc was attached to the spinous process of T10 and intraoperative 3D images obtained with the O-arm and transferred to the Stealth image-guided workstation.  This was now used to place pedicle screws by utilizing a navigated probe to identify the start point, a high speed bur, then a navigated tool to create a navigated tract, and then navigated taps and then navigated screwdrivers.  All screws were from the Cloud [employer] 5.5 Solera system.  These were titanium screws with cobalt chrome heads.  At T10 the screws were 7.5 x 45 on the left, 6.5 x 45 on the right; T9 7.5 x 45 left, 6.5 x 45 right; T8, 5.5 x 45 bilaterally; T7 6.5 x 45 bilaterally; T6 7.5 x 40 on the left and 5.5 x 45 on the right; T6 6.5 x 40 on the left and there was no pedicle to place a screw on the right; T4 6.5 x 40 on the left and again there was no pedicle on the right; T3 6.5 x 40 on the left and 4.5 x 40 on the right; T2 6.5 on the left, 5.5 x 35 on the right.  Intraoperative 2D and 3D images were obtained and on checking them the T8 pedicle screw was medially positioned and was repositioned laterally.  Of note is neurologic monitoring was stable throughout this phase of the operation.     There was marked facet arthropathy at T4-5 and T5-6 in the concavity of the curve.     At this point, we began the Camargo-Vallejo osteotomies.  The inferior articular facets of T3,T4, T5, T6, T7, T8 were removed.  The spinous processes were removed at T4 through T7.  Ligamentum flavum was now taken down, starting at the T3-T4 level, then a Kerrison rongeur was used to take the superior articular facet down out through the lateral portion of the facet.  This completed the Smith-Lua osteotomy at T3-T4.  Then, in a  similar fashion, the same thing was done at T4-T5 and then at T5-T6 and then a T6-T7 and then T7-T8 and then T8-T9.  A wheeled lamina  was used to check to see if there was improved mobility after this and there was.  Next, a 5.5 cobalt chrome standard jyothi was selected and cut to length for the left side and seated into place.  L benders were then used to correct the proximal thoracic curve as much as this would tolerate.  This was done in 2 rounds.  Next, an appropriate length jyothi was cut and contoured and seated into place on the right side and distraction applied through the upper T2 and T3 segments and then from T6 to T3.  Then, additional compression was applied on the left side.  Intraoperative long stitched images were obtained which showed marked improvement of the correction and it appeared to be balanced.  It appeared that we had markedly decreased both the T1 and the T2 tilt.  It appeared that we had improved the overall thoracic kyphosis and that the shoulders appeared level on the table.        The wound was irrigated out copiously, the posterior elements decorticated.  Local autogenous bone graft supplemented with 60 mL of crushed cancellous allograft bone was used to perform a posterior and posterolateral fusion.  #5 Ticron was used to sew the spinous process of T1-T2.  A gram of vancomycin was placed in the wound.  The wound was closed in layers.  An 1/8-inch Hemovac drain was placed superficial to fascia and brought out proximally on the right side.  Interrupted #1 absorbable sutures were used to reapproximate the thoracolumbar fascia in an interrupted fashion followed by a running #1 suture, followed by the drain, followed by deep horizontal approximation sutures, followed by subcutaneous suture, followed by an intradermal suture, followed by benzoin, Steri-Strips and an Aquacel dressing.  Estimated blood loss for this case was 650 mL in a patient with an estimated blood volume of 5900 mL.   He received 150 mL back via Cell Saver return.        Upon completion of the key and crucial portions of the case, I went and spoke to the family and provided them copies of the imaging.      Anticipate that he will be in the hospital 4 or 5 days.  He can wear a soft cervical collar for comfort and then progress activities as tolerated.         MAGO DUKES JR, MD             D: 10/15/2018   T: 10/15/2018   MT: NTS      Name:     LUCERO SADLER   MRN:      [id number]        Account:        [id number]   :      1990           Procedure Date: 10/15/2018      Document: [id number]       cc: Copy for Patient        Kyara Vallejo MD

## 2018-10-16 NOTE — PLAN OF CARE
Problem: Surgery Nonspecified (Adult)  Intervention: Monitor/Manage Pain  Note for the night shift.  D: No issues tonight      Problem: Hematological Disorder  Intervention: Monitor/Manage Pain  Note for the night shift.  D: No issues tonight

## 2018-10-16 NOTE — PROGRESS NOTES
" 10/16/18 1004   Quick Adds   Type of Visit Initial PT Evaluation   Living Environment   Lives With significant other   Living Arrangements apartment   Home Accessibility stairs within home   Number of Stairs to Enter Home 0   Number of Stairs Within Home 24   Stair Railings at Home inside, present at both sides   Transportation Available family or friend will provide   Living Environment Comment There are landings where he can rest roughly every 8 stairs   Self-Care   Dominant Hand right   Usual Activity Tolerance good   Current Activity Tolerance moderate   Regular Exercise yes   Activity/Exercise Type walking   Exercise Amount/Frequency 3-5 times/wk   Equipment Currently Used at Home none   Functional Level Prior   Ambulation 0-->independent   Transferring 0-->independent   Toileting 0-->independent   Bathing 0-->independent   Dressing 0-->independent   Eating 0-->independent   Communication 0-->understands/communicates without difficulty   Swallowing 0-->swallows foods/liquids without difficulty   Cognition 0 - no cognition issues reported   Fall history within last six months no   Which of the above functional risks had a recent onset or change? transferring;ambulation   Prior Functional Level Comment independent   General Information   Onset of Illness/Injury or Date of Surgery - Date 10/15/18   Referring Physician Patrick Xavier MD   Patient/Family Goals Statement \"walk, stairs, move in bed without pain\"   Pertinent History of Current Problem (include personal factors and/or comorbidities that impact the POC) history of a double thoracic scoliotic deformity with pain in his upper thoracic region that is unacceptable to him; T2-T10 spinal fusion   Precautions/Limitations spinal precautions;fall precautions   Weight-Bearing Status - LUE full weight-bearing   Weight-Bearing Status - RUE full weight-bearing   Weight-Bearing Status - LLE full weight-bearing   Weight-Bearing Status - RLE full weight-bearing " "  General Observations Patient supine in bed upon arrival, willing to participate in PT.    General Info Comments Capno, drain, IV, catheter   Cognitive Status Examination   Orientation orientation to person, place and time   Level of Consciousness alert   Follows Commands and Answers Questions 100% of the time   Personal Safety and Judgment intact   Memory intact   Pain Assessment   Patient Currently in Pain Yes, see Vital Sign flowsheet   Posture    Posture Forward head position;Protracted shoulders   Bed Mobility   Bed Mobility Comments Patient require Balbina for supine to sit bed mobility with a log roll   Transfer Skills   Transfer Comments not attempted this session   Gait   Gait Comments not attempted this session   Balance   Balance Comments good seated EOB balance   General Therapy Interventions   Planned Therapy Interventions bed mobility training;balance training;gait training;ROM;strengthening;risk factor education;home program guidelines;progressive activity/exercise;transfer training   Clinical Impression   Criteria for Skilled Therapeutic Intervention yes, treatment indicated   PT Diagnosis decreased functional mobility   Influenced by the following impairments pain, spinal precautions   Functional limitations due to impairments ambulation, transfers, bed mobility, stairs   Clinical Presentation Stable/Uncomplicated   Clinical Presentation Rationale T2-T10 spinal fusion, mobilizing fairly well   Clinical Decision Making (Complexity) Low complexity   Therapy Frequency` 2 times/day   Predicted Duration of Therapy Intervention (days/wks) 3   Anticipated Equipment Needs at Discharge (none)   Anticipated Discharge Disposition Home with Assist   Risk & Benefits of therapy have been explained Yes   Patient, Family & other staff in agreement with plan of care Yes   [facility] AM-PAC TM \"6 Clicks\"   2016, Trustees of [facility], under license to [employer].  All rights reserved.   6 Clicks Short " "Forms Basic Mobility Inpatient Short Form   [facility] AM-PAC  \"6 Clicks\" V.2 Basic Mobility Inpatient Short Form   1. Turning from your back to your side while in a flat bed without using bedrails? 3 - A Little   2. Moving from lying on your back to sitting on the side of a flat bed without using bedrails? 3 - A Little   3. Moving to and from a bed to a chair (including a wheelchair)? 2 - A Lot   4. Standing up from a chair using your arms (e.g., wheelchair, or bedside chair)? 2 - A Lot   5. To walk in hospital room? 2 - A Lot   6. Climbing 3-5 steps with a railing? 2 - A Lot   Basic Mobility Raw Score (Score out of 24.Lower scores equate to lower levels of function) 14   Total Evaluation Time   Total Evaluation Time (Minutes) 12     "

## 2018-10-16 NOTE — CONSULTS
HOSPITALIST CONSULTATION     REQUESTING PHYSICIAN: Patrick Xavier MD    REASON FOR CONSULTATION: Evaluation, Recommendations and co management of Medical Comorbidities.     ASSESSMENT & PLAN :     Jonathon Conde is a 27 year old male with PMH including thoracic levoconvex scoliosis now s/p T2-T10 PISF and SPO on 10/15/18 with Dr. Xavier.   PMH Pre op eval reviewed.   No significant medical hx.   At current reports incisional pain. No other concern.       # Orthopedics:  POD # 0  Hemodynamics: stable.   continue on IV fluids, until adequate PO.   Monitor hgb for anemia of acute blood loss. Transfuse for hgb <7.0    Analgesia: Per Orthopedic team.   DVT prophylaxis:- Per orthopedic team  Activity per orthopedic team.   Antibiotics and wound care as per primary team.   Encourage Incentive spirometry to prevent atelectasis   Minimize use of narcotics as able. Consider bowel regimen with narcotics.     # No other significant medical hx.     Thank you for letting us get involved in care of Mr Conde.   Please page with any questions.        Good Callahan MD  House Physician  Pager: [phone]    10/15/2018        CHIEF COMPLAINT: incisional - back-Pain    HISTORY OF PRESENT ILLNESS: Obtained from the patient and chart review including Pre op evaluation, procedure note.    27 year old year old male  with above discussed medical problems s/p above procedure admitted on 10/15/2018  for post op care and monitoring  (for further details for indication of surgery and operative note, please refer to Patrick Xavier MD note). Medicine consulted to evaluate, recommend and/or co manage medical co morbidities.   No documented hypotension, hypoxemia or other significant complications intra or post operative.   Currently: Incisional Pain ++ Denies any chest pain, shortness of breath or LH or palpitations. Denies any nausea, vomiting or pain abdomen. No fever or chills. Denies any dysuria.   Denies any new rash.   Medical issues as discussed above.   Denies any other medical concern.     PAST MEDICAL HISTORY:   Past Medical History:   Diagnosis Date     Chronic back pain      Scoliosis        PAST SURGICAL HISTORY:   Past Surgical History:   Procedure Laterality Date     CLAVICLE SURGERY Right Age 12     wisdom teeth      15 years old       FH: reviewed.     Family History   Problem Relation Age of Onset     Mental Illness Maternal Grandmother      Substance Abuse Mother      Mental Illness Mother      No Known Problems Father      No Known Problems Brother      Unknown/Adopted Maternal Grandfather      No Known Problems Paternal Grandmother      No Known Problems Paternal Grandfather      Cancer No family hx of      Diabetes No family hx of      HEART DISEASE No family hx of         SH: reviewed.     Social History     Social History     Marital status: Single     Spouse name: N/A     Number of children: 0     Years of education: N/A     Occupational History     [employer]      Social History Main Topics     Smoking status: Former Smoker     Packs/day: 0.25     Years: 13.00     Types: Cigarettes     Quit date: 2/21/2018     Smokeless tobacco: Never Used     Alcohol use 3.6 oz/week     6 Cans of beer per week     Drug use: 7.00 per week     Special: Marijuana      Comment: evenings     Sexual activity: Yes     Partners: Female     Other Topics Concern     None     Social History Narrative       ALLERGIES:   Allergies   Allergen Reactions     Milk Protein Extract GI Disturbance     Sulfa Drugs      PN: LW Reaction: rash         HOME MEDICATIONS:     Prior to Admission medications    Medication Sig Start Date End Date Taking? Authorizing Provider   Cholecalciferol (DIALYVITE VITAMIN D3 MAX) 46400 units TABS Take 50,000 Units by mouth once a week For one month 5/24/18   Kyara Quiñones MD       CURRENT MEDICATIONS:    Current Facility-Administered Medications   Medication     [START ON 10/18/2018]  acetaminophen (TYLENOL) tablet 650 mg     acetaminophen (TYLENOL) tablet 975 mg     benzocaine-menthol (CEPACOL) 15-3.6 MG lozenge 1-2 lozenge     ceFAZolin (ANCEF) 1 g vial to attach to  ml bag for ADULT or 50 ml bag for PEDS     diazepam (VALIUM) tablet 5 mg     HYDROmorphone (PF) (DILAUDID) injection 0.3-0.5 mg     hydrOXYzine (ATARAX) tablet 25 mg     [START ON 10/16/2018] influenza quadrivalent (PF) vacc (FLUZONE or Flulaval or FLUARIX) injection 0.5 mL     [START ON 10/16/2018] Lidocaine (LIDOCARE) 4 % Patch 2 patch     lidocaine (LMX4) cream     lidocaine 1 % 1 mL     [START ON 10/16/2018] lidocaine patch in PLACE     [START ON 10/16/2018] lidocaine patch REMOVAL     [START ON 10/16/2018] melatonin tablet 1 mg     menthol (ICY HOT) 5 % patch 1 patch    And     menthol (ICY HOT) Patch in Place    And     [START ON 10/16/2018] menthol (ICY HOT) patch REMOVAL     methocarbamol (ROBAXIN) tablet 750 mg     metoclopramide (REGLAN) tablet 10 mg    Or     metoclopramide (REGLAN) injection 10 mg     naloxone (NARCAN) injection 0.1-0.4 mg     ondansetron (ZOFRAN-ODT) ODT tab 4 mg    Or     ondansetron (ZOFRAN) injection 4 mg     oxyCODONE IR (ROXICODONE) tablet 10-15 mg     polyethylene glycol (MIRALAX/GLYCOLAX) Packet 17 g     prochlorperazine (COMPAZINE) injection 10 mg    Or     prochlorperazine (COMPAZINE) tablet 10 mg     senna-docusate (SENOKOT-S;PERICOLACE) 8.6-50 MG per tablet 1 tablet    Or     senna-docusate (SENOKOT-S;PERICOLACE) 8.6-50 MG per tablet 2 tablet     sodium chloride (PF) 0.9% PF flush 3 mL     sodium chloride (PF) 0.9% PF flush 3 mL     sodium chloride 0.9% infusion     [START ON 10/17/2018] vitamin D3 (CHOLECALCIFEROL) capsule 50,000 Units     Facility-Administered Medications Ordered in Other Encounters   Medication     iopamidol (ISOVUE-M 200) solution 10 mL         ROS: 10 point ROS neg other than the symptoms noted above in the HPI.    PHYSICAL EXAMINATION:     /88 (BP Location:  "Left arm)  Pulse 110  Temp 98.3  F (36.8  C) (Oral)  Resp 16  Ht 1.905 m (6' 3\")  Wt 84.8 kg (186 lb 15.2 oz)  SpO2 96%  BMI 23.37 kg/m2  Temp (24hrs), Av.1  F (36.7  C), Min:97.7  F (36.5  C), Max:98.4  F (36.9  C)      BMI= Body mass index is 23.37 kg/(m^2).      Intake/Output Summary (Last 24 hours) at 10/15/18 190  Last data filed at 10/15/18 1620   Gross per 24 hour   Intake             2475 ml   Output             1300 ml   Net             1175 ml       General: Alert, interactive, NAD.   HEENT: AT/NC. PERRLA. Anicteric.Moist MM.    Neck: Supple. No JVD. No Lymphadenopathy.  Heart/CVS: Normal S1 and S2. Regular. No m/r/g .   Chest/Respi: Non labored breathing. CTA BL.   Abdomen/GI: Soft, non distended, non tender. No g/r/r.   Extremities/MSK: Distal pulses 2+, well perfused. Rest per ortho.   Neuro: Alert and oriented x4. Cranial nerves II-XII are grossly intact.    Skin:  No new rash over exposed areas.       LABORATORY DATA: reviewed.     Recent Results (from the past 24 hour(s))   Glucose by meter    Collection Time: 10/15/18  6:46 AM   Result Value Ref Range    Glucose 92 70 - 99 mg/dL   Arterial Panel    Collection Time: 10/15/18  8:53 AM   Result Value Ref Range    pH Arterial 7.44 7.35 - 7.45 pH    pCO2 Arterial 37 35 - 45 mm Hg    pO2 Arterial 235 (H) 80 - 105 mm Hg    Bicarbonate Arterial 25 21 - 28 mmol/L    Base Excess Art 1.2 mmol/L    FIO2 60     Sodium 136 133 - 144 mmol/L    Potassium 3.5 3.4 - 5.3 mmol/L    Hemoglobin 14.0 13.3 - 17.7 g/dL    Glucose 113 (H) 70 - 99 mg/dL    Calcium Ionized Whole Blood 4.7 4.4 - 5.2 mg/dL   Arterial Panel    Collection Time: 10/15/18 12:13 PM   Result Value Ref Range    pH Arterial 7.45 7.35 - 7.45 pH    pCO2 Arterial 36 35 - 45 mm Hg    pO2 Arterial 184 (H) 80 - 105 mm Hg    Bicarbonate Arterial 25 21 - 28 mmol/L    Base Excess Art 1.3 mmol/L    FIO2 50     Sodium 136 133 - 144 mmol/L    Potassium 4.0 3.4 - 5.3 mmol/L    Hemoglobin 13.2 (L) 13.3 - " 17.7 g/dL    Glucose 108 (H) 70 - 99 mg/dL    Calcium Ionized Whole Blood 4.4 4.4 - 5.2 mg/dL   Fibrinogen activity    Collection Time: 10/15/18 12:13 PM   Result Value Ref Range    Fibrinogen 180 (L) 200 - 420 mg/dL   INR    Collection Time: 10/15/18 12:13 PM   Result Value Ref Range    INR 1.15 (H) 0.86 - 1.14   Platelet count    Collection Time: 10/15/18 12:13 PM   Result Value Ref Range    Platelet Count 279 150 - 450 10e9/L   Partial thromboplastin time    Collection Time: 10/15/18 12:13 PM   Result Value Ref Range    PTT 29 22 - 37 sec       Recent Results (from the past 24 hour(s))   XR Surgery ISIDRO L/T 5 Min Fluoro    Narrative    This exam was marked as non-reportable because it will not be read by a   radiologist or a [place] non-radiologist provider.                     Good Callahan MD

## 2018-10-16 NOTE — PLAN OF CARE
Problem: Patient Care Overview  Goal: Plan of Care/Patient Progress Review  Outcome: No Change  A&Ox4, VSS, LS clear, BS hypoactive- pt denies passing flatus- on a clear liquid diet, tolerating well. Pt reports intolerable pain- pain regimen adjusted. 15mg Oxycodone administered Q 3 hours, Valium Q 6, IV dilaudid administered for break through pain. CMS/Neuros intact, pt denies numbness/tingling. Denies chest pain/SOB. Stood at EOB with PT this afternoon. Lidocaine patches in place on right posterior back. Dressing CDI. Soft collar in place for comfort. PIV infusing NS@ 100mL/hr. Pt is able to make needs known, call light within reach- continue with POC.     Portillo Catheter D/Luca at 1430.

## 2018-10-16 NOTE — PLAN OF CARE
Problem: Patient Care Overview  Goal: Plan of Care/Patient Progress Review    VS:   Stable no abnormalities noted. No reports of SOB or CP. LS clear equal bilaterally, 2L NC.    Output:   Voids in bryant catheter - patent.    Activity:   Assist of 1    Skin: Intact, w/ the exception of incisions/drains/devices   Pain:   Moderate, continue pain regime: Valium 5mg q6h, dilaudid 0.3-0.5mg q2h, Atarax 25mg q6h, Robaxin 700mg q6h, oxy 10-15mg q3h. Notify moonlighter if pain control inadequate    Neuro/CMS:   Intact, no numbness or tingling reported   Dressing(s):   Posterior back: CDI  Posterior neck: BOB, soft collar in place for comfort only    Diet:   Clear liquid diet    Equipment:   CAPNO  Ice machine    IV's/Drains:   PIV R forearm: infusing NS @ 100mL/hr  Hemovac: 0ccs   Plan:   Continue to monitor    Additional Info:

## 2018-10-16 NOTE — PROGRESS NOTES
Pt. admitted from PACU at 1715. Pt. accompanied by Howie TORO and arrived with personal belongings. Report was taken from Howie Owens.     Pt. is A&Ox4. Capnography activated. VSS. 02 sats are 96% on room air. Lung sounds are clear bilaterally with both anterior and posterior. Bowel sounds are audible in all 4 quadrants. CMS and Neuros are intact. Denies numbness and tingling in all extremities. Has pain in the back and given dilaudid 0.3mg, ICE applied. Pt state pain is tolerable w/ discomfort. Pt. denies nausea, CP, SOB, lightheadedness, and dizziness. Pt is on a clear liquid diet.    Posterior incisional dressing is C/D/I. Hemovac drain is patent and has 0ccs. Portillo Catheter is patent and yellow colored urine output 100ccs. PIV is patent and infusing NS @ 100mL/hr. PCDs are in place to BLEs. Pt. educated on use and purpose of the incentive spirometer. Pt. is oriented to the room and call light system and the call light is within reach. Continue to monitor.

## 2018-10-16 NOTE — PLAN OF CARE
Problem: Patient Care Overview  Goal: Plan of Care/Patient Progress Review  Outcome: No Change  Note for the night shift.  D: Pt is alert and oriented. Girlfriend in the recliner next to him. Pt has hot ice on. Neuros and CMS all intact. Skin warm and dry. Drsg on his back is clean dry and intact. Portillo patent with good UOP. C/O pain being at 6.5/10. Medicating pt tonight with Dilaudid and Oxycodone. Educated pt on his day today-- getting up with PT and up in chair. Time with OT. Adjusting pain meds. Portillo out today.  Dozing at intervals. Weaned off O2. A: Doing well for first night. P: Monitor closely.  Supportive cares. Medicate for pain as needed.Encourage activity today.Portillo out after up with PT.

## 2018-10-17 ENCOUNTER — APPOINTMENT (OUTPATIENT)
Dept: PHYSICAL THERAPY | Facility: CLINIC | Age: 28
DRG: 458 | End: 2018-10-17
Attending: ORTHOPAEDIC SURGERY
Payer: COMMERCIAL

## 2018-10-17 ENCOUNTER — APPOINTMENT (OUTPATIENT)
Dept: OCCUPATIONAL THERAPY | Facility: CLINIC | Age: 28
DRG: 458 | End: 2018-10-17
Attending: ORTHOPAEDIC SURGERY
Payer: COMMERCIAL

## 2018-10-17 LAB
ERYTHROCYTE [DISTWIDTH] IN BLOOD BY AUTOMATED COUNT: 12.4 % (ref 10–15)
GLUCOSE SERPL-MCNC: 100 MG/DL (ref 70–99)
HCT VFR BLD AUTO: 42.2 % (ref 40–53)
HGB BLD-MCNC: 14 G/DL (ref 13.3–17.7)
MCH RBC QN AUTO: 31.2 PG (ref 26.5–33)
MCHC RBC AUTO-ENTMCNC: 33.2 G/DL (ref 31.5–36.5)
MCV RBC AUTO: 94 FL (ref 78–100)
PLATELET # BLD AUTO: 196 10E9/L (ref 150–450)
RBC # BLD AUTO: 4.49 10E12/L (ref 4.4–5.9)
WBC # BLD AUTO: 9.9 10E9/L (ref 4–11)

## 2018-10-17 PROCEDURE — 85027 COMPLETE CBC AUTOMATED: CPT | Performed by: ORTHOPAEDIC SURGERY

## 2018-10-17 PROCEDURE — 25000132 ZZH RX MED GY IP 250 OP 250 PS 637: Performed by: NURSE PRACTITIONER

## 2018-10-17 PROCEDURE — 12000008 ZZH R&B INTERMEDIATE UMMC

## 2018-10-17 PROCEDURE — 97530 THERAPEUTIC ACTIVITIES: CPT | Mod: GP | Performed by: REHABILITATION PRACTITIONER

## 2018-10-17 PROCEDURE — 25000128 H RX IP 250 OP 636: Performed by: STUDENT IN AN ORGANIZED HEALTH CARE EDUCATION/TRAINING PROGRAM

## 2018-10-17 PROCEDURE — 99231 SBSQ HOSP IP/OBS SF/LOW 25: CPT | Performed by: INTERNAL MEDICINE

## 2018-10-17 PROCEDURE — 97165 OT EVAL LOW COMPLEX 30 MIN: CPT | Mod: GO | Performed by: OCCUPATIONAL THERAPIST

## 2018-10-17 PROCEDURE — 40000193 ZZH STATISTIC PT WARD VISIT: Performed by: REHABILITATION PRACTITIONER

## 2018-10-17 PROCEDURE — 36415 COLL VENOUS BLD VENIPUNCTURE: CPT | Performed by: ORTHOPAEDIC SURGERY

## 2018-10-17 PROCEDURE — 97530 THERAPEUTIC ACTIVITIES: CPT | Mod: GO | Performed by: OCCUPATIONAL THERAPIST

## 2018-10-17 PROCEDURE — 25000132 ZZH RX MED GY IP 250 OP 250 PS 637: Performed by: STUDENT IN AN ORGANIZED HEALTH CARE EDUCATION/TRAINING PROGRAM

## 2018-10-17 PROCEDURE — 97535 SELF CARE MNGMENT TRAINING: CPT | Mod: GO | Performed by: OCCUPATIONAL THERAPIST

## 2018-10-17 PROCEDURE — 97116 GAIT TRAINING THERAPY: CPT | Mod: GP | Performed by: REHABILITATION PRACTITIONER

## 2018-10-17 PROCEDURE — 82947 ASSAY GLUCOSE BLOOD QUANT: CPT | Performed by: INTERNAL MEDICINE

## 2018-10-17 PROCEDURE — 40000133 ZZH STATISTIC OT WARD VISIT: Performed by: OCCUPATIONAL THERAPIST

## 2018-10-17 RX ADMIN — ACETAMINOPHEN 975 MG: 325 TABLET, FILM COATED ORAL at 17:55

## 2018-10-17 RX ADMIN — SENNOSIDES AND DOCUSATE SODIUM 2 TABLET: 8.6; 5 TABLET ORAL at 19:45

## 2018-10-17 RX ADMIN — HYDROXYZINE HYDROCHLORIDE 25 MG: 25 TABLET ORAL at 12:19

## 2018-10-17 RX ADMIN — DIAZEPAM 5 MG: 5 TABLET ORAL at 10:36

## 2018-10-17 RX ADMIN — OXYCODONE HYDROCHLORIDE 15 MG: 5 TABLET ORAL at 19:45

## 2018-10-17 RX ADMIN — ACETAMINOPHEN 975 MG: 325 TABLET, FILM COATED ORAL at 02:01

## 2018-10-17 RX ADMIN — CHOLECALCIFEROL CAP 1.25 MG (50000 UNIT) 50000 UNITS: 1.25 CAP at 08:42

## 2018-10-17 RX ADMIN — OXYCODONE HYDROCHLORIDE 15 MG: 5 TABLET ORAL at 05:51

## 2018-10-17 RX ADMIN — OXYCODONE HYDROCHLORIDE 15 MG: 5 TABLET ORAL at 01:07

## 2018-10-17 RX ADMIN — GABAPENTIN 300 MG: 300 CAPSULE ORAL at 17:55

## 2018-10-17 RX ADMIN — SENNOSIDES AND DOCUSATE SODIUM 2 TABLET: 8.6; 5 TABLET ORAL at 08:42

## 2018-10-17 RX ADMIN — ACETAMINOPHEN 975 MG: 325 TABLET, FILM COATED ORAL at 10:35

## 2018-10-17 RX ADMIN — Medication 0.3 MG: at 22:12

## 2018-10-17 RX ADMIN — OXYCODONE HYDROCHLORIDE 15 MG: 5 TABLET ORAL at 12:19

## 2018-10-17 RX ADMIN — MORPHINE SULFATE 15 MG: 15 TABLET, EXTENDED RELEASE ORAL at 19:45

## 2018-10-17 RX ADMIN — LIDOCAINE 2 PATCH: 560 PATCH PERCUTANEOUS; TOPICAL; TRANSDERMAL at 09:57

## 2018-10-17 RX ADMIN — POLYETHYLENE GLYCOL 3350 17 G: 17 POWDER, FOR SOLUTION ORAL at 08:43

## 2018-10-17 RX ADMIN — MORPHINE SULFATE 15 MG: 15 TABLET, EXTENDED RELEASE ORAL at 08:42

## 2018-10-17 RX ADMIN — DIAZEPAM 5 MG: 5 TABLET ORAL at 17:55

## 2018-10-17 RX ADMIN — Medication 0.5 MG: at 15:09

## 2018-10-17 RX ADMIN — OXYCODONE HYDROCHLORIDE 15 MG: 5 TABLET ORAL at 16:24

## 2018-10-17 RX ADMIN — MENTHOL 1 PATCH: 205.5 PATCH TOPICAL at 02:05

## 2018-10-17 RX ADMIN — GABAPENTIN 300 MG: 300 CAPSULE ORAL at 02:01

## 2018-10-17 RX ADMIN — GABAPENTIN 300 MG: 300 CAPSULE ORAL at 10:36

## 2018-10-17 RX ADMIN — SODIUM CHLORIDE: 9 INJECTION, SOLUTION INTRAVENOUS at 01:46

## 2018-10-17 RX ADMIN — Medication 0.5 MG: at 08:38

## 2018-10-17 RX ADMIN — SODIUM CHLORIDE: 9 INJECTION, SOLUTION INTRAVENOUS at 11:46

## 2018-10-17 RX ADMIN — DIAZEPAM 5 MG: 5 TABLET ORAL at 03:59

## 2018-10-17 RX ADMIN — OXYCODONE HYDROCHLORIDE 15 MG: 5 TABLET ORAL at 09:19

## 2018-10-17 ASSESSMENT — ACTIVITIES OF DAILY LIVING (ADL)
ADLS_ACUITY_SCORE: 9

## 2018-10-17 NOTE — PLAN OF CARE
Problem: Patient Care Overview  Goal: Plan of Care/Patient Progress Review  Outcome: No Change  Note for the night shift.  D: Pt sleeping between checks. States he was able to stand at the bedside today. Pt able to sit up with minimal assist from his SO and dangle to void. States it was painful, but able to do it. At 0100 and about 0600 got more Oxy for pain. Valium x1 for spasms. Says Robaxin really does not help. CMS and neuros all intact. Denies passing any gas yet. VSS.  Call light with in reach.Able to make needs known. A: Doing OK.P: Monitor closely.  Supportive cares. Medicate for pain as needed.Encourage activity today.

## 2018-10-17 NOTE — PROGRESS NOTES
"Orthopaedic Surgery Progress Note     Subjective: No acute events overnight. Pain well controlled. Tolerating diet. Voiding spontaneously. +Flatus, no BM. Denies fever or chills, CP, SOB, numbness or tingling, motor dysfunction or weakness.     Objective: /88 (BP Location: Left arm)  Pulse 110  Temp 98.3  F (36.8  C) (Oral)  Resp 16  Ht 1.905 m (6' 3\")  Wt 86.8 kg (191 lb 4.8 oz)  SpO2 96%  BMI 23.91 kg/m2    Drain(s): 40 / 15 / 15 last 3 shifts    General: NAD, alert and oriented, cooperative with exam.   Cardio: RRR, extremities wwp.   Respiratory: Non-labored breathing.  MSK: Dressing c/d/i. HV in place, serosanguinous output. Radial, DP and PT pulses 2+. SILT C5-C8 and L3-S1 bilaterally.   C3-5: Easy diaphragmatic respirations without utilization of accessory muscles    C6: Biceps R and L 5/5 strength    C7: Triceps R and L 5/5 strength    C8:  R and L 5/5 strength    T1: Dorsal interossei R and L 5/5 strength          --------------------------------   L2-3: Hip flexion L and R 5/5 strength    L4:  Knee extension L and R 5/5 strength   L5:  Foot / EHL dorsiflexion L and R 5/5 strength   S1:  Plantarflexion  L and R 5/5 strength    Labs:     Recent Labs  Lab 10/17/18  0550 10/16/18  0625 10/15/18  1213 10/15/18  0853   WBC 9.9 8.5  --   --    HGB 14.0 13.8 13.2* 14.0    202 279  --        Recent Labs  Lab 10/17/18  0550 10/16/18  0625 10/15/18  1213 10/15/18  0853   NA  --  140 136 136   POTASSIUM  --  4.3 4.0 3.5   CHLORIDE  --  105  --   --    CO2  --  31  --   --    BUN  --  6*  --   --    CR  --  1.10  --   --    * 102* 108* 113*       Recent Labs  Lab 10/15/18  1213   INR 1.15*   PTT 29       Imaging: pending    Assessment and Plan: Jonathon Conde is a 27 year old male with PMH including thoracic levoconvex scoliosis now s/p T2-T10 PISF and SPO on 10/15/18 with Dr. Xavier.      Ortho Spine Primary  Activity: Up with assist until independent. No excessive bending or twisting. No " lifting >10 lbs x 6 weeks. No Scout lift for transfers.   Weight bearing status: WBAT.  Pain management: Transition from IV to PO as tolerated.    Antibiotics: Ancef x 24 hours.  Diet: Begin with clear fluids and progress diet as tolerated.   DVT prophylaxis: SCDs only. No chemical DVT ppx needed at discharge.  Imaging: XR Upright Full Spine PTDC - ordered.  Labs: Hgb POD#1 and 2.  Bracing/Splinting: Soft cervical collar for comfort.  Dressings: Keep Aquacel c/d/i x 7 days.  Drains: Document output per shift, will be discontinued at Orthopedic Surgery discretion.  Protillo catheter: Remove POD#1.   Physical Therapy/Occupational Therapy: Eval and treat.  Consults: Hospitalist.  Follow-up: Clinic with Dr. Xavier in 6 weeks. Full spine standing x-rays needed.   Disposition: Pending progress with therapies, pain control on orals, and medical stability, anticipate discharge to home on POD #3-4.  Future Appointments  Date Time Provider Department Center   10/17/2018 9:30 AM Adam Gale PTA [facility] [place]   10/17/2018 3:00 PM Adam Gale PTA [facility] [place]   10/18/2018 9:45 AM Chiquis Lockett OT [facility] [place]   11/29/2018 2:45 PM Patrick Xavier MD [facility]   1/9/2019 2:30 PM Patrick Xavier MD [facility]     [facility]  Orthopaedic PGY4  Pager: [phone]    For questions about this patient, please attempt to contact me at my pager prior to contacting the Orthopaedic Surgery resident on call. Thank you!

## 2018-10-17 NOTE — PLAN OF CARE
Problem: Patient Care Overview  Goal: Plan of Care/Patient Progress Review  Discharge Planner OT   Patient plan for discharge: Home with SO  Current status: Patient alert and oriented, very pleasant and motivated.  Patient supine to sit with CGA, transferred bed to chair with CGA with walker.  Tolerated spine chair for 10 minutes.  Ambulated to/from bathroom and completed toilet transfer with CGA with commode overlay.  Patient shaking due to pain.  Barriers to return to prior living situation: None anticipated once pain managed  Recommendations for discharge: Home with SO  Rationale for recommendations: Continued daily OT for maximum independence in ADLs/mobility       Entered by: Chiquis Lockett 10/17/2018 8:20 AM

## 2018-10-17 NOTE — PLAN OF CARE
Problem: Patient Care Overview  Goal: Plan of Care/Patient Progress Review  Outcome: Improving  Alert and oriented. VSS. C/o back pain managed with scheduled medications and prn oxycodone, valium, and tylenol (see eMAR) and hot-ice machine with partial relief. CMS/neuros intact. Incision dressing CDI. Hemovac drain in place, put out 5 mL. Lungs clear, encouraged IS use. Voiding spontaneously without difficulty. Bowel sounds active and passing flatus. Advanced to full liquid diet, tolerating well with no N/V. PIVx2, one infusing and one SL. Up in room with assist x1 and walker. PCDs in place while in bed. Able to make needs known. Continue with POC.

## 2018-10-17 NOTE — PROGRESS NOTES
"Patient seen and examined with RN     S- better today. Pain controlled     4 point ROS done and neg unless mentioned     O-   /88 (BP Location: Left arm)  Pulse 110  Temp 98.3  F (36.8  C) (Oral)  Resp 16  Ht 1.905 m (6' 3\")  Wt 86.8 kg (191 lb 4.8 oz)  SpO2 96%  BMI 23.91 kg/m2   Gen- pleasant  laying on bed  HEENT- NAD, SOHAIL  Neck- supple  CVS- I+II+ no m/r/g  RS- CTAB  Abdo- soft, no tenderness . No g/r/r   Ext- no edema   MSK- as per ortho     LABS:   BMP  Recent Labs  Lab 10/17/18  0550 10/16/18  0625 10/15/18  1213 10/15/18  0853   NA  --  140 136 136   POTASSIUM  --  4.3 4.0 3.5   CHLORIDE  --  105  --   --    BELKYS  --  8.4*  --   --    CO2  --  31  --   --    BUN  --  6*  --   --    CR  --  1.10  --   --    * 102* 108* 113*     CBC  Recent Labs  Lab 10/17/18  0550 10/16/18  0625 10/15/18  1213   WBC 9.9 8.5  --    RBC 4.49 4.52  --    HGB 14.0 13.8 13.2*   HCT 42.2 41.4  --    MCV 94 92  --    MCH 31.2 30.5  --    MCHC 33.2 33.3  --    RDW 12.4 12.3  --     202 279     INR  Recent Labs  Lab 10/15/18  1213   INR 1.15*     A/P:  s/p T2-T10 PISF and SPO on 10/15/18 with Dr. Xavier - hemodynamics stable   - pain controlled.     We will sign off. Please call with any Q's or change in status.      Kvng Deluca MD (Pager- [phone])   Internal Medicine/ Hospitalist    "

## 2018-10-17 NOTE — PROGRESS NOTES
10/17/18 0811   Quick Adds   Type of Visit Initial Occupational Therapy Evaluation   Living Environment   Lives With significant other   Living Arrangements apartment   Number of Stairs to Enter Home 0   Number of Stairs Within Home 24   Living Environment Comment Has lower toilet, tub   Self-Care   Usual Activity Tolerance good   Current Activity Tolerance moderate   Regular Exercise yes   Activity/Exercise Type walking   Exercise Amount/Frequency 3-5 times/wk   Equipment Currently Used at Home none   Activity/Exercise/Self-Care Comment Patient independent in self-cares/mobility without AE   Functional Level Prior   Ambulation 0-->independent   Transferring 0-->independent   Toileting 0-->independent   Bathing 0-->independent   Dressing 0-->independent   Eating 0-->independent   Communication 0-->understands/communicates without difficulty   Swallowing 0-->swallows foods/liquids without difficulty   Cognition 0 - no cognition issues reported   Fall history within last six months no   Prior Functional Level Comment Patient independent in self-cares/mobility without AE   General Information   Onset of Illness/Injury or Date of Surgery - Date 10/15/18   Referring Physician Dr. Xavier   Patient/Family Goals Statement return home to baseline   Additional Occupational Profile Info/Pertinent History of Current Problem Jonathon Conde is a 27 year old male with PMH including thoracic levoconvex scoliosis now s/p T2-T10 PISF and SPO   Precautions/Limitations spinal precautions;other (see comments)  (soft collar for comfort)   General Observations On RA, alert   Cognitive Status Examination   Cognitive Comment No cognitive concerns   Sensory Examination   Sensory Comments No reports of N/T   Pain Assessment   Patient Currently in Pain Yes, see Vital Sign flowsheet   Mobility   Bed Mobility Bed mobility skill: Sit to supine;Bed mobility skill: Supine to sit   Bed Mobility Skill: Sit to Supine   Level of [place]: Sit/Supine  "contact guard   Bed Mobility Skill: Supine to Sit   Level of [place]: Supine/Sit contact guard   Transfer Skill: Bed to Chair/Chair to Bed   Level of [place]: Bed to Chair contact guard   Assistive Device - Transfer Skill Bed to Chair Chair to Bed Rehab Eval standard walker   Transfer Skill: Sit to Stand   Level of [place]: Sit/Stand contact guard   Assistive Device for Transfer: Sit/Stand standard walker   Transfer Skill: Toilet Transfer   Level of [place]: Toilet contact guard   Assistive Device standard walker;seat riser;grab bars   Bathing   Level of [place] unable to perform   Upper Body Dressing   Level of [place]: Dress Upper Body other (see comments)  (not assessed)   Lower Body Dressing   Level of [place]: Dress Lower Body moderate assist (50% patients effort)   Toileting   Level of [place]: Toilet other (see comments)  (not assessed)   Grooming   Level of [place]: Grooming stand-by assist   Eating/Self Feeding   Level of [place]: Eating independent   Activities of Daily Living Analysis   Impairments Contributing to Impaired Activities of Daily Living pain;post surgical precautions;ROM decreased   General Therapy Interventions   Planned Therapy Interventions ADL retraining   Clinical Impression   Criteria for Skilled Therapeutic Interventions Met yes, treatment indicated   OT Diagnosis impaired self-cares/mobility   Influenced by the following impairments pain; decreased ROM   Assessment of Occupational Performance 1-3 Performance Deficits   Identified Performance Deficits dressing, bathing, toileting   Clinical Decision Making (Complexity) Low complexity   Therapy Frequency daily   Predicted Duration of Therapy Intervention (days/wks) 3-4 days   Anticipated Discharge Disposition Home with Assist   Risks and Benefits of Treatment have been explained. Yes   Patient, Family & other staff in agreement with plan of care Yes   [facility] AM-[facility] TM \"6 " "Clicks\"   2016, Trustees of [facility], under license to [employer].  All rights reserved.   6 Clicks Short Forms Daily Activity Inpatient Short Form   [facility] AM-PAC  \"6 Clicks\" Daily Activity Inpatient Short Form   1. Putting on and taking off regular lower body clothing? 2 - A Lot   2. Bathing (including washing, rinsing, drying)? 2 - A Lot   3. Toileting, which includes using toilet, bedpan or urinal? 3 - A Little   4. Putting on and taking off regular upper body clothing? 3 - A Little   5. Taking care of personal grooming such as brushing teeth? 4 - None   6. Eating meals? 4 - None   Daily Activity Raw Score (Score out of 24.Lower scores equate to lower levels of function) 18   Total Evaluation Time   Total Evaluation Time (Minutes) 8     "

## 2018-10-17 NOTE — PLAN OF CARE
Problem: Patient Care Overview  Goal: Plan of Care/Patient Progress Review  PT - per plan established by the Physical Therapist, according to functional mobility the  discharge recommendation is home with assist as needed. Pt is progressing well, limited by pain and fatigue. Pt needing CGA for all bed mobility and side lying to sitting. Pt needing CGA for sit to stand to WW from elevated bed. Pt amb up to 80' and 100'x 1 with only slight pressure from  WW.   Discharge Planner PT   Patient plan for discharge: home with assist  Current status: see above.   Barriers to return to prior living situation: pain weakness and fatigue.   Recommendations for discharge: home with assist as needed.   Rationale for recommendations: pt is cont making good progress good support at home.        Entered by: Adam Gale 10/17/2018 4:03 PM

## 2018-10-18 ENCOUNTER — APPOINTMENT (OUTPATIENT)
Dept: PHYSICAL THERAPY | Facility: CLINIC | Age: 28
DRG: 458 | End: 2018-10-18
Attending: ORTHOPAEDIC SURGERY
Payer: COMMERCIAL

## 2018-10-18 ENCOUNTER — APPOINTMENT (OUTPATIENT)
Dept: GENERAL RADIOLOGY | Facility: CLINIC | Age: 28
DRG: 458 | End: 2018-10-18
Attending: ORTHOPAEDIC SURGERY
Payer: COMMERCIAL

## 2018-10-18 ENCOUNTER — APPOINTMENT (OUTPATIENT)
Dept: OCCUPATIONAL THERAPY | Facility: CLINIC | Age: 28
DRG: 458 | End: 2018-10-18
Attending: ORTHOPAEDIC SURGERY
Payer: COMMERCIAL

## 2018-10-18 LAB
ERYTHROCYTE [DISTWIDTH] IN BLOOD BY AUTOMATED COUNT: 12.1 % (ref 10–15)
HCT VFR BLD AUTO: 45.6 % (ref 40–53)
HGB BLD-MCNC: 15.1 G/DL (ref 13.3–17.7)
MCH RBC QN AUTO: 30.8 PG (ref 26.5–33)
MCHC RBC AUTO-ENTMCNC: 33.1 G/DL (ref 31.5–36.5)
MCV RBC AUTO: 93 FL (ref 78–100)
PLATELET # BLD AUTO: 246 10E9/L (ref 150–450)
RBC # BLD AUTO: 4.91 10E12/L (ref 4.4–5.9)
WBC # BLD AUTO: 10.9 10E9/L (ref 4–11)

## 2018-10-18 PROCEDURE — 97535 SELF CARE MNGMENT TRAINING: CPT | Mod: GO | Performed by: OCCUPATIONAL THERAPIST

## 2018-10-18 PROCEDURE — 72080 X-RAY EXAM THORACOLMB 2/> VW: CPT

## 2018-10-18 PROCEDURE — 97116 GAIT TRAINING THERAPY: CPT | Mod: GP

## 2018-10-18 PROCEDURE — 97110 THERAPEUTIC EXERCISES: CPT | Mod: GP

## 2018-10-18 PROCEDURE — 25000128 H RX IP 250 OP 636: Performed by: STUDENT IN AN ORGANIZED HEALTH CARE EDUCATION/TRAINING PROGRAM

## 2018-10-18 PROCEDURE — 85027 COMPLETE CBC AUTOMATED: CPT | Performed by: ORTHOPAEDIC SURGERY

## 2018-10-18 PROCEDURE — 36415 COLL VENOUS BLD VENIPUNCTURE: CPT | Performed by: ORTHOPAEDIC SURGERY

## 2018-10-18 PROCEDURE — 97530 THERAPEUTIC ACTIVITIES: CPT | Mod: GO | Performed by: OCCUPATIONAL THERAPIST

## 2018-10-18 PROCEDURE — 12000008 ZZH R&B INTERMEDIATE UMMC

## 2018-10-18 PROCEDURE — 40000133 ZZH STATISTIC OT WARD VISIT: Performed by: OCCUPATIONAL THERAPIST

## 2018-10-18 PROCEDURE — 40000193 ZZH STATISTIC PT WARD VISIT

## 2018-10-18 PROCEDURE — 97530 THERAPEUTIC ACTIVITIES: CPT | Mod: GP

## 2018-10-18 PROCEDURE — 25000132 ZZH RX MED GY IP 250 OP 250 PS 637: Performed by: NURSE PRACTITIONER

## 2018-10-18 PROCEDURE — 25000132 ZZH RX MED GY IP 250 OP 250 PS 637: Performed by: STUDENT IN AN ORGANIZED HEALTH CARE EDUCATION/TRAINING PROGRAM

## 2018-10-18 PROCEDURE — 25000128 H RX IP 250 OP 636: Performed by: INTERNAL MEDICINE

## 2018-10-18 RX ORDER — HYDRALAZINE HYDROCHLORIDE 20 MG/ML
10 INJECTION INTRAMUSCULAR; INTRAVENOUS EVERY 6 HOURS PRN
Status: DISCONTINUED | OUTPATIENT
Start: 2018-10-18 | End: 2018-10-19 | Stop reason: HOSPADM

## 2018-10-18 RX ADMIN — HYDROXYZINE HYDROCHLORIDE 25 MG: 25 TABLET ORAL at 06:11

## 2018-10-18 RX ADMIN — OXYCODONE HYDROCHLORIDE 15 MG: 5 TABLET ORAL at 03:34

## 2018-10-18 RX ADMIN — OXYCODONE HYDROCHLORIDE 15 MG: 5 TABLET ORAL at 00:37

## 2018-10-18 RX ADMIN — OXYCODONE HYDROCHLORIDE 15 MG: 5 TABLET ORAL at 09:02

## 2018-10-18 RX ADMIN — ACETAMINOPHEN 975 MG: 325 TABLET, FILM COATED ORAL at 02:10

## 2018-10-18 RX ADMIN — OXYCODONE HYDROCHLORIDE 15 MG: 5 TABLET ORAL at 14:48

## 2018-10-18 RX ADMIN — SENNOSIDES AND DOCUSATE SODIUM 2 TABLET: 8.6; 5 TABLET ORAL at 08:26

## 2018-10-18 RX ADMIN — DIAZEPAM 5 MG: 5 TABLET ORAL at 02:09

## 2018-10-18 RX ADMIN — DIAZEPAM 5 MG: 5 TABLET ORAL at 23:34

## 2018-10-18 RX ADMIN — GABAPENTIN 300 MG: 300 CAPSULE ORAL at 11:07

## 2018-10-18 RX ADMIN — GABAPENTIN 300 MG: 300 CAPSULE ORAL at 18:00

## 2018-10-18 RX ADMIN — POLYETHYLENE GLYCOL 3350 17 G: 17 POWDER, FOR SOLUTION ORAL at 08:26

## 2018-10-18 RX ADMIN — GABAPENTIN 300 MG: 300 CAPSULE ORAL at 02:10

## 2018-10-18 RX ADMIN — OXYCODONE HYDROCHLORIDE 15 MG: 5 TABLET ORAL at 21:14

## 2018-10-18 RX ADMIN — ACETAMINOPHEN 975 MG: 325 TABLET, FILM COATED ORAL at 18:00

## 2018-10-18 RX ADMIN — OXYCODONE HYDROCHLORIDE 15 MG: 5 TABLET ORAL at 18:00

## 2018-10-18 RX ADMIN — HYDROXYZINE HYDROCHLORIDE 25 MG: 25 TABLET ORAL at 18:00

## 2018-10-18 RX ADMIN — ACETAMINOPHEN 975 MG: 325 TABLET, FILM COATED ORAL at 11:07

## 2018-10-18 RX ADMIN — MORPHINE SULFATE 15 MG: 15 TABLET, EXTENDED RELEASE ORAL at 08:25

## 2018-10-18 RX ADMIN — DIAZEPAM 5 MG: 5 TABLET ORAL at 14:47

## 2018-10-18 RX ADMIN — OXYCODONE HYDROCHLORIDE 15 MG: 5 TABLET ORAL at 06:12

## 2018-10-18 RX ADMIN — OXYCODONE HYDROCHLORIDE 15 MG: 5 TABLET ORAL at 12:13

## 2018-10-18 RX ADMIN — LIDOCAINE 1 PATCH: 560 PATCH PERCUTANEOUS; TOPICAL; TRANSDERMAL at 08:26

## 2018-10-18 RX ADMIN — HYDROXYZINE HYDROCHLORIDE 25 MG: 25 TABLET ORAL at 12:13

## 2018-10-18 RX ADMIN — MORPHINE SULFATE 15 MG: 15 TABLET, EXTENDED RELEASE ORAL at 20:41

## 2018-10-18 RX ADMIN — Medication 0.5 MG: at 11:07

## 2018-10-18 RX ADMIN — DIAZEPAM 5 MG: 5 TABLET ORAL at 08:25

## 2018-10-18 RX ADMIN — HYDRALAZINE HYDROCHLORIDE 10 MG: 20 INJECTION INTRAMUSCULAR; INTRAVENOUS at 12:53

## 2018-10-18 ASSESSMENT — ACTIVITIES OF DAILY LIVING (ADL)
ADLS_ACUITY_SCORE: 9
ADLS_ACUITY_SCORE: 10
ADLS_ACUITY_SCORE: 9
ADLS_ACUITY_SCORE: 9

## 2018-10-18 NOTE — PLAN OF CARE
Problem: Surgery Nonspecified (Adult)  Intervention: Monitor/Manage Pain  Note for the night shift.  D: Doing OK.      Problem: Hematological Disorder  Intervention: Monitor/Manage Pain  Note for the night shift.  D: Doing OK.

## 2018-10-18 NOTE — PROGRESS NOTES
Care Coordinator - Discharge Planning    Admission Date/Time:  10/15/2018  Attending MD:  Patrick Xavier MD     Data  Date of initial CC assessment:  10/18/18  Chart reviewed, discussed with interdisciplinary team.   Patient was admitted for:   1. Other secondary scoliosis, thoracic region         Assessment   Concerns with insurance coverage for discharge needs: None.  Current Living Situation: Patient lives alone.  Support System: Supportive  Services Involved: no services in home prior to admit  Transportation at Discharge: Family or friend will provide  Transportation to Medical Appointments:    - Not applicable  Barriers to Discharge: None      Coordination of Care and Referrals: Provided patient/family with options for Outpatient Rehab.  Patient prefers OP PT [facility] to home at [facility]-OP PT referral sent to Central scheduling.      Plan  Anticipated Discharge Date:  10/18/18  Anticipated Discharge Plan:  Home with OP PT    CTS Handoff completed:  YES    Hellen GLASER RN CCM    RN Care Coordinator 10A  E-mail: [email or web address]  Phone: [phone]  Pager: [phone]  To contact weekend RNCC, dial [phone] and enter pager number [phone] at prompt. This pager can not be contacted by text page or outside line.

## 2018-10-18 NOTE — PLAN OF CARE
Problem: Patient Care Overview  Goal: Plan of Care/Patient Progress Review    VS:   Stable no abnormalities noted. No reports of SOB or CP. LS clear equal bilaterally, on room air.    Output:   Voids spontaneously, w/ out difficulty. Passing gas - changed to regular diet   Activity:   Assist of 1 w/ walker   Skin: Intact, w/ the exception of incisions/drains/devices   Pain:   Moderate, manageable w/ oxy 10-15mg q3h, valium 5mg q6h, Atarax 25mg q6h and dilaudid 0.3-0.5mg q2h for breakthrough pain. Schedule extended morphine release and gabapentin ordered.    Neuro/CMS:   Intact, no numbness or tingling reported   Dressing(s):   Posterior back: CDI, no drainage    Diet:   Regular, tolerated well. No N&V reported   Equipment:   Walker   IV's/Drains:   PIV R forearm: saline locked, CDI  PIC L forearm: saline locked, CDI  Hemovac:    Plan:   Continue to monitor    Additional Info:   X-ray not completed, try again tomorrow 10/18/18

## 2018-10-18 NOTE — PLAN OF CARE
Problem: Patient Care Overview  Goal: Plan of Care/Patient Progress Review  Discharge Planner OT   Patient plan for discharge: Home with assist  Current status: Met all OT goals, plans to obtain AE from outside source.    Barriers to return to prior living situation: None  Recommendations for discharge: Home with assist  Rationale for recommendations: No further inpatient or home OT needs    Occupational Therapy Discharge Summary    Reason for therapy discharge:    All goals and outcomes met, no further needs identified.    Progress towards therapy goal(s). See goals on Care Plan in [facility] electronic health record for goal details.  Goals met    Therapy recommendation(s):    No further therapy is recommended.           Entered by: Chiquis Lockett 10/18/2018 11:08 AM

## 2018-10-18 NOTE — PLAN OF CARE
Problem: Patient Care Overview  Goal: Plan of Care/Patient Progress Review  Discharge Planner PT   Patient plan for discharge: Home with assist  Current status: Pt demonstrating IND with all bed mobility and transfers within precautions.  Pt completed stair training with no concerns, also ambulated 300' with SBA and no walker.  Continue to encourage ambulation in halls for improved stability and activity tolerance.  Barriers to return to prior living situation: Need for increased assist with ambulation, LE weakness  Recommendations for discharge: Home with assist and OP PT  Rationale for recommendations: Pending continued progress with PT       Entered by: Clara Schaffer 10/18/2018 11:49 AM

## 2018-10-18 NOTE — PROGRESS NOTES
"Orthopaedic Surgery Progress Note     Subjective: No acute events overnight. Endorses more pain with activity; ambulated 80' with PT yesterday. Scheduled MS Contin added to PO oxycodone, IV Dilaudid, PO Atarax, and PO Valium. Tolerating diet. Voiding spontaneously. +Flatus, no BM. Denies fever or chills, CP, SOB, numbness or tingling, motor dysfunction or weakness.     Objective: /79  Pulse 110  Temp 98.1  F (36.7  C) (Oral)  Resp 16  Ht 1.905 m (6' 3\")  Wt 86.8 kg (191 lb 4.8 oz)  SpO2 95%  BMI 23.91 kg/m2    Drain(s): 15/5/20 last 3 shifts    General: NAD, alert and oriented, cooperative with exam.   Cardio: RRR, extremities wwp.   Respiratory: Non-labored breathing.  MSK: Dressing c/d/i. HV in place, serosanguinous output. Radial, DP and PT pulses 2+. SILT C5-C8 and L3-S1 bilaterally.   C3-5: Easy diaphragmatic respirations without utilization of accessory muscles    C6: Biceps R and L 5/5 strength    C7: Triceps R and L 5/5 strength    C8:  R and L 5/5 strength    T1: Dorsal interossei R and L 5/5 strength          --------------------------------   L2-3: Hip flexion L and R 5/5 strength    L4:  Knee extension L and R 5/5 strength   L5:  Foot / EHL dorsiflexion L and R 5/5 strength   S1:  Plantarflexion  L and R 5/5 strength    Labs:     Recent Labs  Lab 10/17/18  0550 10/16/18  0625 10/15/18  1213 10/15/18  0853   WBC 9.9 8.5  --   --    HGB 14.0 13.8 13.2* 14.0    202 279  --        Recent Labs  Lab 10/17/18  0550 10/16/18  0625 10/15/18  1213 10/15/18  0853   NA  --  140 136 136   POTASSIUM  --  4.3 4.0 3.5   CHLORIDE  --  105  --   --    CO2  --  31  --   --    BUN  --  6*  --   --    CR  --  1.10  --   --    * 102* 108* 113*       Recent Labs  Lab 10/15/18  1213   INR 1.15*   PTT 29       Imaging: pending    Assessment and Plan: Jonathon Conde is a 27 year old male with PMH including thoracic levoconvex scoliosis now s/p T2-T10 PISF and SPO on 10/15/18 with Dr. Xavier. "      Ortho Spine Primary  Activity: Up with assist until independent. No excessive bending or twisting. No lifting >10 lbs x 6 weeks. No Scout lift for transfers.   Weight bearing status: WBAT.  Pain management: Transition from IV to PO as tolerated.    Antibiotics: Ancef x 24 hours.  Diet: Begin with clear fluids and progress diet as tolerated.   DVT prophylaxis: SCDs only. No chemical DVT ppx needed at discharge.  Imaging: XR Upright Full Spine PTDC - ordered.  Labs: Hgb POD#1 and 2.  Bracing/Splinting: Soft cervical collar for comfort.  Dressings: Keep Aquacel c/d/i x 7 days.  Drains: Document output per shift, will be discontinued at Orthopedic Surgery discretion.  Portillo catheter: Remove POD#1.   Physical Therapy/Occupational Therapy: Eval and treat.  Consults: Hospitalist.  Follow-up: Clinic with Dr. Xavier in 6 weeks. Full spine standing x-rays needed.   Disposition: Pending progress with therapies, pain control on orals, and medical stability, anticipate discharge to home on POD #3-4.  Future Appointments  Date Time Provider Department Center   10/17/2018 9:30 AM Adam Gale PTA [facility] [place]   10/17/2018 3:00 PM Adam Gale PTA [facility] [place]   10/18/2018 9:45 AM Chiquis Lockett OT [facility] [place]   11/29/2018 2:45 PM Patrick Xavier MD [facility]   1/9/2019 2:30 PM Patrick Xavier MD [facility]     [facility]  Orthopaedic PGY4  Pager: [phone]    For questions about this patient, please attempt to contact me at my pager prior to contacting the Orthopaedic Surgery resident on call. Thank you!

## 2018-10-18 NOTE — PLAN OF CARE
Problem: Patient Care Overview  Goal: Plan of Care/Patient Progress Review  Outcome: Improving  Patient A & O x 4. Neuros and CMS intact. VSS except /113 and afebrile, SpO2 98% on room air (see flowsheet), MD aware. IV Hydralazine 10 mg given as ordered. LS clear, BS + x 4, patient passing flatus and had a BM this shift. Patient urinating good amounts of clear yellow urine in the urinal. Patient states pain is tolerable. Pain meds given as ordered and PRN, hot/cold machine used for comfort. Surgical site dressing is C/D/I. Hemovac drain removed this shift and dressing is C/D/I. Left/Right PIV's saline locked. Patient on a regular diet and tolerating it well. Patient up ad barbara with SBA and walker in room and hallway. Hgb 15.1 this AM, MD aware. Standing Spine X-rays completed this shift. Patient able to make needs known. Call light within reach. Will continue with POC.

## 2018-10-18 NOTE — PLAN OF CARE
Problem: Surgery Nonspecified (Adult)  Intervention: Monitor/Manage Pain  Note for the night shift.  D:doing ok      Problem: Hematological Disorder  Intervention: Monitor/Manage Pain  Note for the night shift.  D:doing ok

## 2018-10-18 NOTE — PLAN OF CARE
Problem: Patient Care Overview  Goal: Plan of Care/Patient Progress Review  Outcome: No Change  Note for the night shift.  D: SLeeping off and on tonight. Has pain whenever asked. Medicated for pain as often as he can have meds tonight. Thinks he is sore because he did more activity yesterday. Neuros and CMS all intact. Skin good. Drsg CDI. SO sleeping on the cot in the room with him. Passing gas. Has soft colar on all night.Oxy just brought to pt q3h tonight to keep up with his pain.  Call light with in reach.Able to make needs known. A: Doing OK.  P: Monitor closely.  Supportive cares. Medicate for pain as needed. Encourage activity today. Up in chair and walking in the dougherty. Remove HV today. Re-inforced with pt he needs to use the call light and let staff know if he is having pain. Try to give pain meds before PT and OT.

## 2018-10-19 ENCOUNTER — APPOINTMENT (OUTPATIENT)
Dept: PHYSICAL THERAPY | Facility: CLINIC | Age: 28
DRG: 458 | End: 2018-10-19
Attending: ORTHOPAEDIC SURGERY
Payer: COMMERCIAL

## 2018-10-19 VITALS
BODY MASS INDEX: 23.78 KG/M2 | WEIGHT: 191.3 LBS | HEIGHT: 75 IN | OXYGEN SATURATION: 94 % | SYSTOLIC BLOOD PRESSURE: 151 MMHG | TEMPERATURE: 98.5 F | HEART RATE: 102 BPM | DIASTOLIC BLOOD PRESSURE: 97 MMHG | RESPIRATION RATE: 18 BRPM

## 2018-10-19 PROCEDURE — 90686 IIV4 VACC NO PRSV 0.5 ML IM: CPT | Performed by: ORTHOPAEDIC SURGERY

## 2018-10-19 PROCEDURE — 40000193 ZZH STATISTIC PT WARD VISIT

## 2018-10-19 PROCEDURE — 25000132 ZZH RX MED GY IP 250 OP 250 PS 637: Performed by: STUDENT IN AN ORGANIZED HEALTH CARE EDUCATION/TRAINING PROGRAM

## 2018-10-19 PROCEDURE — 25000128 H RX IP 250 OP 636: Performed by: ORTHOPAEDIC SURGERY

## 2018-10-19 PROCEDURE — 97530 THERAPEUTIC ACTIVITIES: CPT | Mod: GP

## 2018-10-19 PROCEDURE — 97110 THERAPEUTIC EXERCISES: CPT | Mod: GP

## 2018-10-19 PROCEDURE — 25000132 ZZH RX MED GY IP 250 OP 250 PS 637: Performed by: NURSE PRACTITIONER

## 2018-10-19 RX ORDER — POLYETHYLENE GLYCOL 3350 17 G/17G
17 POWDER, FOR SOLUTION ORAL DAILY
Qty: 24 PACKET | Refills: 0 | Status: SHIPPED | OUTPATIENT
Start: 2018-10-20

## 2018-10-19 RX ORDER — OXYCODONE HYDROCHLORIDE 10 MG/1
10-15 TABLET ORAL EVERY 4 HOURS PRN
Qty: 70 TABLET | Refills: 0 | Status: SHIPPED | OUTPATIENT
Start: 2018-10-19 | End: 2018-10-30

## 2018-10-19 RX ORDER — AMOXICILLIN 250 MG
1 CAPSULE ORAL 2 TIMES DAILY
Qty: 30 TABLET | Refills: 0 | Status: SHIPPED | OUTPATIENT
Start: 2018-10-19

## 2018-10-19 RX ORDER — HYDROXYZINE HYDROCHLORIDE 25 MG/1
25 TABLET, FILM COATED ORAL EVERY 6 HOURS PRN
Qty: 60 TABLET | Refills: 0 | Status: SHIPPED | OUTPATIENT
Start: 2018-10-19 | End: 2018-11-04

## 2018-10-19 RX ORDER — LIDOCAINE 4 G/G
2 PATCH TOPICAL EVERY 24 HOURS
Qty: 30 PATCH | Refills: 1 | Status: SHIPPED | OUTPATIENT
Start: 2018-10-19

## 2018-10-19 RX ORDER — GABAPENTIN 300 MG/1
300 CAPSULE ORAL EVERY 8 HOURS
Qty: 90 CAPSULE | Refills: 0 | Status: SHIPPED | OUTPATIENT
Start: 2018-10-19 | End: 2018-11-25

## 2018-10-19 RX ORDER — DIAZEPAM 5 MG
5 TABLET ORAL EVERY 6 HOURS PRN
Qty: 50 TABLET | Refills: 0 | Status: SHIPPED | OUTPATIENT
Start: 2018-10-19 | End: 2018-10-30

## 2018-10-19 RX ADMIN — ACETAMINOPHEN 975 MG: 325 TABLET, FILM COATED ORAL at 12:05

## 2018-10-19 RX ADMIN — SENNOSIDES AND DOCUSATE SODIUM 2 TABLET: 8.6; 5 TABLET ORAL at 08:18

## 2018-10-19 RX ADMIN — GABAPENTIN 300 MG: 300 CAPSULE ORAL at 12:05

## 2018-10-19 RX ADMIN — OXYCODONE HYDROCHLORIDE 10 MG: 10 TABLET ORAL at 12:06

## 2018-10-19 RX ADMIN — LIDOCAINE 2 PATCH: 560 PATCH PERCUTANEOUS; TOPICAL; TRANSDERMAL at 08:27

## 2018-10-19 RX ADMIN — GABAPENTIN 300 MG: 300 CAPSULE ORAL at 02:12

## 2018-10-19 RX ADMIN — ACETAMINOPHEN 975 MG: 325 TABLET, FILM COATED ORAL at 02:11

## 2018-10-19 RX ADMIN — OXYCODONE HYDROCHLORIDE 15 MG: 5 TABLET ORAL at 00:05

## 2018-10-19 RX ADMIN — OXYCODONE HYDROCHLORIDE 15 MG: 5 TABLET ORAL at 08:18

## 2018-10-19 RX ADMIN — POLYETHYLENE GLYCOL 3350 17 G: 17 POWDER, FOR SOLUTION ORAL at 08:18

## 2018-10-19 RX ADMIN — OXYCODONE HYDROCHLORIDE 15 MG: 5 TABLET ORAL at 04:39

## 2018-10-19 RX ADMIN — MORPHINE SULFATE 15 MG: 15 TABLET, EXTENDED RELEASE ORAL at 08:18

## 2018-10-19 RX ADMIN — HYDROXYZINE HYDROCHLORIDE 25 MG: 25 TABLET ORAL at 00:05

## 2018-10-19 RX ADMIN — DIAZEPAM 5 MG: 5 TABLET ORAL at 08:18

## 2018-10-19 RX ADMIN — INFLUENZA A VIRUS A/MICHIGAN/45/2015 X-275 (H1N1) ANTIGEN (FORMALDEHYDE INACTIVATED), INFLUENZA A VIRUS A/SINGAPORE/INFIMH-16-0019/2016 IVR-186 (H3N2) ANTIGEN (FORMALDEHYDE INACTIVATED), INFLUENZA B VIRUS B/PHUKET/3073/2013 ANTIGEN (FORMALDEHYDE INACTIVATED), AND INFLUENZA B VIRUS B/MARYLAND/15/2016 BX-69A ANTIGEN (FORMALDEHYDE INACTIVATED) 0.5 ML: 15; 15; 15; 15 INJECTION, SUSPENSION INTRAMUSCULAR at 12:07

## 2018-10-19 ASSESSMENT — ACTIVITIES OF DAILY LIVING (ADL)
ADLS_ACUITY_SCORE: 10

## 2018-10-19 ASSESSMENT — PAIN DESCRIPTION - DESCRIPTORS
DESCRIPTORS: ACHING;CONSTANT
DESCRIPTORS: SORE;ACHING;CONSTANT

## 2018-10-19 NOTE — PLAN OF CARE
Problem: Patient Care Overview  Goal: Plan of Care/Patient Progress Review  Discharge Planner PT   Patient plan for discharge: home with assist + OP PT  Current status: pt IND in all mobility today including bed mobility with good log roll technique, sit<>stand to no AD, ambulates x50' no AD (likley no difficulty with household distances, limited with time in session this date to actually perform). He also navigates 3x5 stairs with single rail Janice noted good stability throughout. Reviewed LE HEP to promote strengthening at home prior to initiation of OP PT. Pt and significant other with no concerns mobility wise returning home.   Barriers to return to prior living situation: none  Recommendations for discharge: Home with OP PT  Rationale for recommendations: Pt would benefit from ongoing therapy to address high level balance and strength deficits in order to progress community mobility and functional mobility training towards PLOF secondary to spinal surgery.       Entered by: Rosanne Anderson 10/19/2018 11:58 AM         Physical Therapy Discharge Summary    Reason for therapy discharge:    All goals and outcomes met, no further needs identified.    Progress towards therapy goal(s). See goals on Care Plan in [facility] electronic health record for goal details.  Goals met    Therapy recommendation(s):    Continued therapy is recommended.  Rationale/Recommendations:  OP PT for high level balance and strength to progress IND communiyt mobility towards PLOF..

## 2018-10-19 NOTE — PROGRESS NOTES
"Orthopaedic Surgery Progress Note     Subjective: No acute events overnight. Pain controlled. Able to climb stairs without difficulty. Discharged from PT and OT yesterday. Tolerating diet. Voiding spontaneously. +Flatus, +BM. Denies fever or chills, CP, SOB, numbness or tingling, motor dysfunction or weakness.     Objective: /89 (BP Location: Left arm)  Pulse 87  Temp 97.4  F (36.3  C) (Oral)  Resp 18  Ht 1.905 m (6' 3\")  Wt 86.8 kg (191 lb 4.8 oz)  SpO2 96%  BMI 23.91 kg/m2    Drain(s): removed    General: NAD, alert and oriented, cooperative with exam.   Cardio: RRR, extremities wwp.   Respiratory: Non-labored breathing.  MSK: Dressing c/d/i. Radial, DP and PT pulses 2+.   SILT C5-C8 and L3-S1 bilaterally.   C3-5: Easy diaphragmatic respirations without utilization of accessory muscles    C6: Biceps R and L 5/5 strength    C7: Triceps R and L 5/5 strength    C8:  R and L 5/5 strength    T1: Dorsal interossei R and L 5/5 strength          --------------------------------   L2-3: Hip flexion L and R 5/5 strength    L4:  Knee extension L and R 5/5 strength   L5:  Foot / EHL dorsiflexion L and R 5/5 strength   S1:  Plantarflexion  L and R 5/5 strength    Labs:     Recent Labs  Lab 10/18/18  0630 10/17/18  0550 10/16/18  0625 10/15/18  1213   WBC 10.9 9.9 8.5  --    HGB 15.1 14.0 13.8 13.2*    196 202 279       Recent Labs  Lab 10/17/18  0550 10/16/18  0625 10/15/18  1213 10/15/18  0853   NA  --  140 136 136   POTASSIUM  --  4.3 4.0 3.5   CHLORIDE  --  105  --   --    CO2  --  31  --   --    BUN  --  6*  --   --    CR  --  1.10  --   --    * 102* 108* 113*       Recent Labs  Lab 10/15/18  1213   INR 1.15*   PTT 29       Imaging: Improved coronal alignment of previous scoliosis. Hardware intact and stable.    Assessment and Plan: Jonathon Conde is a 27 year old male with PMH including thoracic levoconvex scoliosis now s/p T2-T10 PISF and SPO on 10/15/18 with Dr. Xavier.      Ortho Spine " Primary  Activity: Up with assist until independent. No excessive bending or twisting. No lifting >10 lbs x 6 weeks. No Scout lift for transfers.   Weight bearing status: WBAT.  Pain management: Transition from IV to PO as tolerated.    Antibiotics: Ancef x 24 hours.  Diet: Begin with clear fluids and progress diet as tolerated.   DVT prophylaxis: SCDs only. No chemical DVT ppx needed at discharge.  Imaging: XR Upright Full Spine PTDC - ordered.  Labs: Hgb POD#1 and 2.  Bracing/Splinting: Soft cervical collar for comfort.  Dressings: Keep Aquacel c/d/i x 7 days.  Drains: Document output per shift, will be discontinued at Orthopedic Surgery discretion.  Portillo catheter: Remove POD#1.   Physical Therapy/Occupational Therapy: Eval and treat.  Consults: Hospitalist.  Follow-up: Clinic with Dr. Xavier in 6 weeks. Full spine standing x-rays needed.   Disposition: Pending progress with therapies, pain control on orals, and medical stability, anticipate discharge to home today.    Future Appointments  Date Time Provider Department [place]   10/19/2018 11:30 AM Rosanne Anderson, PT [facility]   11/29/2018 2:45 PM Patrick Xavier MD [facility]   1/9/2019 2:30 PM Patrick Xavier MD [facility]     [facility]  Orthopaedic PGY4  Pager: [phone]    For questions about this patient, please attempt to contact me at my pager prior to contacting the Orthopaedic Surgery resident on call. Thank you!

## 2018-10-19 NOTE — PLAN OF CARE
Problem: Patient Care Overview  Goal: Plan of Care/Patient Progress Review  Outcome: Adequate for Discharge Date Met: 10/19/18  Nursing  Post op spine  S- pt stated felt ready for discontinue to home today.  States pain fairly well controlled w 10-15mg oxy + tyelnol and ms contin and lido. Pt aware ms contin will be stopped at home.  Neuro intact, moves about room well per self.  Eating reg diet well.  Voiding incision drsg intact  Soft collar on for comfort.  Reviewed discontinue instructions w pt and SO using teachback  Verbalizes understanding.    Discontinue to home w prescriptions and belongings. Per w/ch to car to home  A- stable ready for discharge to home  R- home w SO per car.

## 2018-10-19 NOTE — PLAN OF CARE
Problem: Patient Care Overview  Goal: Plan of Care/Patient Progress Review  Outcome: No Change  Pt up independently in room. Pain meds given as available. CMS intact. Dressings C/D/I. Tolerating regular diet. BM earlier today. Voiding spontaneously. Pt able to make needs known.

## 2018-10-19 NOTE — DISCHARGE SUMMARY
ORTHOPAEDIC DISCHARGE SUMMARY     Date of Admission: 10/15/2018  Date of Discharge: 10/19/2018  Disposition: Home  Staff Physician: Patrick Xvaier MD  Primary Care Provider: Kyara Quiñones    DISCHARGE DIAGNOSIS:  Scoliosis     PROCEDURES: Procedure(s):  Posterior Spinal Fusion Thoracic 2 - Thoracic 10, Camargo Lua Osteotomies Thoracic 2 - Thoracic 10  on 10/15/2018    BRIEF HISTORY:  The patient is an adult male with a history of a double thoracic scoliotic deformity with pain in his upper thoracic region that is unacceptable to him.  His SRS questionnaire score was 52.  Normally for someone of his age and otherwise healthy, ought to be 80+, and he had a visual analog pain scale of a 7.  He has undergone previous imaging which showed a major double thoracic curve pattern with a proximal thoracic curve of approximately 55 degrees and the lower thoracic curve being approximately 50 degrees.  He had substantial obliquity at T1 and T2.  A myelo-CT had been done which showed no occult intraspinal anomalies.  It did show very diminutive pedicles in the concavity of the proximal thoracic curve which was as expected.  The risks, benefits, alternative treatments, and expected outcomes have been extensively discussed with the patient on several occasions and it was his desire to proceed with surgical treatment.     HOSPITAL COURSE:    Surgery was uncomplicated. Jonathon Conde has done well post-operatively. Medicine was consulted post operatively to aid in management of medical comorbidities. See final recommendations below. The patient received routine nursing cares and is medically stable. Vital signs are stable. The patient is tolerating a regular diet without GI distress/nausea or vomiting. Voiding spontaneously. All PT/OT goals have been met for safe mobility. Pain is now controlled on oral medications which will be available on discharge. Stool softeners have been used while taking pain medications  to help prevent constipation. Jonathon Conde is deemed medically safe to discharge.     Antibiotics:  IV Ancef given periop and 24 hours postop.  DVT prophylaxis:  Ambulatory with SCD's  PT Progress:  Has met PT/OT goals for safe mobility.   Pain Meds:  Weaned off all IV pain meds by discharge.  Inpatient Events: No significant events or complications.     Discharge orders and instructions as below.    FOLLOWUP:    Future Appointments  Date Time Provider Department Center   10/19/2018 11:30 AM Rosanne Anderson, PT [facility] [place]   11/29/2018 2:45 PM Patrick Xavier MD [facility]   1/9/2019 2:30 PM Patrick Xavier MD [facility]       Appointments are at the [facility] ([address] [place] [address], [id number]). Call [phone] if you haven't heard regarding these appointments within 7 days of discharge.    PLANNED DISCHARGE ORDERS:     DVT Prophylaxis: Ambulatory      Current Discharge Medication List      START taking these medications    Details   diazepam (VALIUM) 5 MG tablet Take 1 tablet (5 mg) by mouth every 6 hours as needed for muscle spasms  Qty: 50 tablet, Refills: 0    Associated Diagnoses: Other secondary scoliosis, thoracic region      gabapentin (NEURONTIN) 300 MG capsule Take 1 capsule (300 mg) by mouth every 8 hours  Qty: 90 capsule, Refills: 0    Associated Diagnoses: Other secondary scoliosis, thoracic region      hydrOXYzine (ATARAX) 25 MG tablet Take 1 tablet (25 mg) by mouth every 6 hours as needed (adjuvant pain and muscle spasms)  Qty: 60 tablet, Refills: 0    Associated Diagnoses: Other secondary scoliosis, thoracic region      Lidocaine (LIDOCARE) 4 % Patch Place 2 patches onto the skin every 24 hours  Qty: 30 patch, Refills: 1    Associated Diagnoses: Other secondary scoliosis, thoracic region      menthol (ICY HOT) 5 % PTCH Apply 1 patch topically every 8 hours as needed for muscle soreness  Qty: 15 each, Refills: 1    Associated Diagnoses: Other secondary  scoliosis, thoracic region      oxyCODONE IR (ROXICODONE) 10 MG tablet Take 1-1.5 tablets (10-15 mg) by mouth every 4 hours as needed for moderate to severe pain  Qty: 70 tablet, Refills: 0    Associated Diagnoses: History of thoracic spinal fusion      polyethylene glycol (MIRALAX/GLYCOLAX) Packet Take 17 g by mouth daily  Qty: 24 packet, Refills: 0    Associated Diagnoses: Other secondary scoliosis, thoracic region      senna-docusate (SENOKOT-S;PERICOLACE) 8.6-50 MG per tablet Take 1 tablet by mouth 2 times daily  Qty: 30 tablet, Refills: 0    Associated Diagnoses: Other secondary scoliosis, thoracic region         CONTINUE these medications which have NOT CHANGED    Details   Cholecalciferol (DIALYVITE VITAMIN D3 MAX) 61209 units TABS Take 50,000 Units by mouth once a week For one month  Qty: 4 tablet, Refills: 0    Associated Diagnoses: Vitamin D deficiency               Discharge Procedure Orders  Physical Therapy Referral   Standing Status: Future  Standing Exp. Date: 10/18/19   Referral Type: Rehab Therapy Physical Therapy     Reason for your hospital stay   Order Comments: Scoliosis surgery     Follow Up and recommended labs and tests   Order Comments: Follow up on 11/29/18 with Dr. Xavier at [facility] ([address] [place] [address]). Call [phone] to schedule a follow-up appointment at this location with your provider.     Discharge Instructions   Order Comments: Postoperative Instructions - Scoliosis correction and spinal fusion   [place] [id number]     You have had a spinal fusion. You have a dressing called Aquacel on your incision which can be worn for up to 7 days, and it can be worn in the shower. After the Aquacel has been removed, you do not need a dressing. There are steri strips directly over the incision. Those may stay in place until they fall off, which can take up to 2 weeks. It is okay to shower and  wash gently with soap and water. Do not soak in the bath. No pools, hot tubs, or lakes for 6 weeks.     For postoperative pain control, we have prescribed a narcotic medication.  This should be taken for the first few weeks following surgery, but as soon as you are able, transition to an over-the-counter type medication such as Tylenol.  You may not take non-steroidal anti-inflammatory medications (eg: Advil, Ibuprofen, Aleve, others) for the first 3 months after surgery as it interferes with bone healing. While taking the narcotic medication, there are several precautions that you must adhere to. These medications have numerous side effects including nausea, constipation, and drowsiness. If you experience nausea, this may be relieved by taking the medication with food or a light meal. To avoid constipation, please use an over-the-counter stool softener or drink lots of water and eat fruits and vegetables. Avoid operating heavy machinery or driving an automobile while on narcotic medications.      You will be seen in the clinic at 6 weeks following surgery. You will not need to attend physical therapy during this time. You can focus on cardiovascular fitness by walking as much as you can tolerate. Avoid bending, lifting, and twisting. Your weight lifting restriction is 10 pounds until your first follow-up appointment.      When you get home, you may resume your normal diet as tolerated. You may not be very hungry but try to eat small healthy meals to help you heal. Remember to drink plenty of water/fluids to help keep you hydrated.    After surgery, you may have a sensitive scar.  When the dressing has been removed, you may massage the scar to decrease sensitivity and help break down scar tissue. Do this up to 4 times per day.    Call our clinic ([phone] during regular office hours) IF:  Fevers (temperature greater than 100.4 degrees Fahrenheit)  Pain that is getting worse or does not respond to pain  medications  Drainage from your wound  Increasing redness about the wound  Any other worrisome symptoms     After regular office hours, call [phone] for the on-call resident MD for questions.   RESIDENT DOES NOT HAVE ABILITY TO PRESCRIBE NARCOTICS.    Call your primary doctor or seek medical care if you have any of the following: temperature greater than 101.4, chest pain, increased shortness of breath, nausea or vomiting.           Geo Norton, PGY4

## 2018-10-19 NOTE — PLAN OF CARE
Problem: Patient Care Overview  Goal: Plan of Care/Patient Progress Review  Outcome: Improving    VS:   VSS   Output:   Voids spontaneously without difficulty   Activity:   Up independently in room   Skin: WDL ex incision   Pain:   Tolerable with discomfort. Managed with 10mg of PRN oxycodone and 25mg of PRN atarax.   Neuro/CMS:   Intact   Dressing(s):   N/A   Diet:   Tolerating regular diet. No nausea/vomiting.   LDA:   PIV saline locked in left and right hand   Equipment:   Soft collar on for comofort   Plan:   Plan is to discharge today   Additional Info:   Significant other by bedside. Supportive with cares.

## 2018-10-22 ENCOUNTER — PATIENT OUTREACH (OUTPATIENT)
Dept: CARE COORDINATION | Facility: CLINIC | Age: 28
End: 2018-10-22

## 2018-10-30 DIAGNOSIS — M41.54 OTHER SECONDARY SCOLIOSIS, THORACIC REGION: ICD-10-CM

## 2018-10-30 DIAGNOSIS — Z98.1 HISTORY OF THORACIC SPINAL FUSION: ICD-10-CM

## 2018-10-30 RX ORDER — DIAZEPAM 5 MG
5 TABLET ORAL EVERY 6 HOURS PRN
Qty: 40 TABLET | Refills: 0 | Status: SHIPPED | OUTPATIENT
Start: 2018-10-30 | End: 2018-11-04

## 2018-10-30 RX ORDER — OXYCODONE HYDROCHLORIDE 10 MG/1
TABLET ORAL
Qty: 60 TABLET | Refills: 0 | Status: SHIPPED | OUTPATIENT
Start: 2018-10-30 | End: 2018-11-04

## 2018-10-30 NOTE — TELEPHONE ENCOUNTER
DOS: 10/15/18 Posterior spinal fusion Thoracic 1/2-thoracic 12, Camargo Lua Osteotomies thoracic 2/3 - thoracic 10.    Received request for refill of patient's postoperative medications, oxycodone and valium. Patient is 15 days postop for surgery listed above. Okayed refills with Cathleen TRAVIS RNCC. Patient has been trying to stretch out the oxycodone; he is taking mainly at nighttime; during day he takes 1 tablet every 6-7 hours. He uses the Valium current as prescribed.    Patient requests that the prescriptions be filled at the [facility] pharmacy for  by either his girlfriend, Keisha Ochoa today or himself when he comes for physical therapy tomorrow.

## 2018-10-31 ENCOUNTER — THERAPY VISIT (OUTPATIENT)
Dept: PHYSICAL THERAPY | Facility: CLINIC | Age: 28
End: 2018-10-31
Payer: COMMERCIAL

## 2018-10-31 DIAGNOSIS — M41.54 OTHER SECONDARY SCOLIOSIS, THORACIC REGION: ICD-10-CM

## 2018-10-31 DIAGNOSIS — Z47.89 ORTHOPEDIC AFTERCARE: Primary | ICD-10-CM

## 2018-10-31 PROCEDURE — 97161 PT EVAL LOW COMPLEX 20 MIN: CPT | Mod: GP | Performed by: PHYSICAL THERAPIST

## 2018-10-31 PROCEDURE — G8982 BODY POS GOAL STATUS: HCPCS | Mod: GP | Performed by: PHYSICAL THERAPIST

## 2018-10-31 PROCEDURE — 97110 THERAPEUTIC EXERCISES: CPT | Mod: GP | Performed by: PHYSICAL THERAPIST

## 2018-10-31 PROCEDURE — 97530 THERAPEUTIC ACTIVITIES: CPT | Mod: GP | Performed by: PHYSICAL THERAPIST

## 2018-10-31 PROCEDURE — G8981 BODY POS CURRENT STATUS: HCPCS | Mod: GP | Performed by: PHYSICAL THERAPIST

## 2018-10-31 NOTE — PROGRESS NOTES
Physical Therapy Initial Evaluation  October 31, 2018     MD Instructions/Precautions/Restrictions: PT eval and treat.     Therapist Impression:   Jonathon Conde presents with findings consistent with post-operative spinal fusion status, with related impairments limiting his ability to walk, sit, stand, lie down/sleep, work, perform physical activities for health/wellness. Skilled PT services are necessary in order to reduce impairments and improve independent function.     Subjective:   Date of Surgery: 10/15/18  C/C: s/p T2-10 posterior spinal fusion. Previous imaging which showed a major double thoracic curve pattern with a proximal thoracic curve of approximately 55 degrees and the lower thoracic curve being approximately 50 degrees. Had 5 day hospital stay and discharged home. Lives in 3-story walk up and reports currently having some difficulties with balance/trying to get used to his new posture. Reports pain management is fair to good, worse at night trying to get comfortable to sleep. Uses neck collar only for comfort.   Quality of pain is dull and aching. Pains are described as intermittent in nature. Pain is worse: as day goes on. Pain is rated 7/10.   History of symptoms: Pains began gradually as the result of surgery. Since onset, symptoms are improving.  Worsened by: Walking (balance difficulty), sleeping/lying down, sitting more difficult than standing for prolonged periods of time.    Alleviated by: Rest, Heat, Pain medications, PT and Surgery.    General health as reported by patient: good  Pertinent medical/surgical history: Refer to health history in EMR. Imaging: x-ray and MRI. 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 Patient's goals are: decrease pain, be able to go on flight to [facility] mid-December, hiking/cycling. Return to MD:  11/29/18.     Objective:  THORACIC  EXAMINATION    Posture: Wearing cervical collar (soft) for comfort, good sagittal balance,     Lumbar flexion/extension: Min loss flexion d/t HS length, EXT WNL.   Cervical flexion/extension: Mod loss flexion with pull down length of spine. Extension WNL.   Cervical rotation: Min loss bilaterally.     Motor Control    Mid Trap 4-/5   Low Trap 3+/5   Core Fair TA contraction in isolation     Assessment/Plan:    The patient is a 27 year old male with chief complaint of back pain.    The patient has the following significant findings with corresponding treatment plan.  Diagnosis 1:  S/p T2-10 posterior spinal fusion, scoliosis    Pain -  hot/cold therapy, manual therapy, splint/taping/bracing/orthotics, self management and directional preference exercise  Decreased ROM/flexibility - manual therapy, therapeutic exercise and home program  Decreased joint mobility - manual therapy, therapeutic exercise and home program  Decreased strength - therapeutic exercise, therapeutic activities and home program  Impaired balance - neuro re-education, therapeutic activities and home program  Decreased proprioception - neuro re-education and therapeutic activities  Impaired gait - gait training and assistive devices  Impaired muscle performance - neuro re-education and home program  Decreased function - therapeutic activities and home program  Impaired posture - neuro re-education, therapeutic activities and home program  Instability -  Therapeutic Activity, Therapeutic Exercise, Neuromuscular Re-education, Splinting/Taping/Bracing/Orthotic, home program    Therapy Evaluation Codes:   1) History comprised of:   Personal factors that impact the plan of care:      Please refer to health history in EMR.    Comorbidity factors that impact the plan of care are:      Please refer to health history in EMR.     Medications impacting care: None.  2) Examination of Body Systems comprised of:   Body structures and functions that impact the plan  of care:      Thoracic Spine.   Activity limitations that impact the plan of care are:      Bathing, Bending, Cooking, Driving, Dressing, Jumping, Lifting, Reading/Computer work, Running, Sitting, Sports, Squatting/kneeling, Stairs, Standing, Walking, Working, Sleeping and Laying down.   Clinical presentation characteristics are:    Stable/Uncomplicated.  3) Presentation comprised of:   Presentation scored as Low complexity with uncomplicated characteristics..  4) Decision-Making    Low complexity using standardized patient assessment instrument and/or measureable assessment of functional outcome.  Cumulative Therapy Evaluation is: Low complexity.    Previous and current functional limitations:  (See Goal Flow Sheet for this information)    Short term and Long term goals: (See Goal Flow Sheet for this information)     Communication ability:  Patient appears to be able to clearly communicate and understand verbal and written communication and follow directions correctly.  Treatment Explanation - The following has been discussed with the patient: RX ordered/plan of care, anticipated outcomes, and possible risks and side effects.  This patient would benefit from PT intervention to resume normal activities.   Rehab potential is good.    Frequency:  1 X week, once daily  Duration:  for 6 weeks tapering to 1x/month for 2 months  Discharge Plan: Achieve all LTGs, be independent in home treatment program, and reach maximal therapeutic benefit.    Please refer to the daily flowsheet for treatment today, total treatment time and time spent performing 1:1 timed codes.

## 2018-10-31 NOTE — MR AVS SNAPSHOT
After Visit Summary   10/31/2018    Jonathon Conde    MRN: [id number]           Patient Information     Date Of Birth          1990        Visit Information        Provider Department      10/31/2018 10:20 AM Isai Gerard PT [facility]        Today's Diagnoses     Orthopedic aftercare    -  1    Other secondary scoliosis, thoracic region           Follow-ups after your visit        Your next 10 appointments already scheduled     Nov 14, 2018  1:20 PM CST   [facility] Spine with Alexandra Berry PT   [facility] ([facility])    [address] [place] [id number] [phone]            Nov 21, 2018  1:20 PM CST   [facility] Spine with Alexandra Berry PT   [facility] ([facility])    [address] [place] [id number] [phone]            Nov 29, 2018  2:45 PM CST   (Arrive by 2:15 PM)   RETURN SCOLIOSIS with Patrick Xavier MD   [facility] ([facility])    [address] [place] [id number] [phone]            Jan 09, 2019  2:30 PM CST   (Arrive by 2:00 PM)   RETURN SCOLIOSIS with Patrick Xavier MD   [facility] ([facility])    [address] [place] [id number] [phone]              Who to contact     If you have questions or need follow up information about today's clinic visit or your schedule please contact [facility] directly at [phone].  Normal or non-critical lab and imaging results will be communicated to you by MyChart, letter or phone within 4 business days after the clinic has received the results. If you do not hear from us within 7 days, please contact the clinic through MyChart or phone. If you have a critical or abnormal lab result, we will notify you by phone as  soon as possible.  Submit refill requests through [facility] or call your pharmacy and they will forward the refill request to us. Please allow 3 business days for your refill to be completed.          Additional Information About Your Visit        [facility] Information     [facility] gives you secure access to your electronic health record. If you see a primary care provider, you can also send messages to your care team and make appointments. If you have questions, please call your primary care clinic.  If you do not have a primary care provider, please call [phone] and they will assist you.        Care EveryWhere ID     This is your Care EveryWhere ID. This could be used by other organizations to access your [facility] medical records  [id number]         Blood Pressure from Last 3 Encounters:   10/19/18 (!) 151/97   10/05/18 127/55   05/21/18 116/81    Weight from Last 3 Encounters:   10/15/18 86.8 kg (191 lb 4.8 oz)   10/05/18 85.5 kg (188 lb 8 oz)   05/21/18 84.4 kg (186 lb 1.6 oz)              We Performed the Following     HC PT EVAL, LOW COMPLEXITY     [facility] INITIAL EVAL REPORT     Physical Therapy Referral     THERAPEUTIC ACTIVITIES     THERAPEUTIC EXERCISES        Primary Care Provider Office Phone # Fax #    Kyara Denise Vallejo [phone] [address] [facility] [address]        Equal Access to Services     ANALI MILLIGAN : Hadii aad ku hadasho Soomaali, waaxda luqadaha, qaybta kaalmada ademariselayada, wyatt almonte. So [facility] [phone].    ATENCIÓN: Si habla español, tiene a perrin disposición servicios gratuitos de asistencia lingüística. Llame al [phone].    We comply with applicable federal civil rights laws and [place] laws. We do not discriminate on the basis of race, color, national origin, age, disability, sex, sexual orientation, or gender identity.            Thank you!     Thank you for choosing [facility]  for your  care. Our goal is always to provide you with excellent care. Hearing back from our patients is one way we can continue to improve our services. Please take a few minutes to complete the written survey that you may receive in the mail after your visit with us. Thank you!             Your Updated Medication List - Protect others around you: Learn how to safely use, store and throw away your medicines at www.disposemymeds.org.          This list is accurate as of 10/31/18 11:44 AM.  Always use your most recent med list.                   Brand Name Dispense Instructions for use Diagnosis    Cholecalciferol 20446 units Tabs    DIALYVITE VITAMIN D3 MAX    4 tablet    Take 50,000 Units by mouth once a week For one month    Vitamin D deficiency       diazepam 5 MG tablet    VALIUM    40 tablet    Take 1 tablet (5 mg) by mouth every 6 hours as needed for muscle spasms    Other secondary scoliosis, thoracic region       gabapentin 300 MG capsule    NEURONTIN    90 capsule    Take 1 capsule (300 mg) by mouth every 8 hours    Other secondary scoliosis, thoracic region       hydrOXYzine 25 MG tablet    ATARAX    60 tablet    Take 1 tablet (25 mg) by mouth every 6 hours as needed (adjuvant pain and muscle spasms)    Other secondary scoliosis, thoracic region       Lidocaine 4 % Patch    LIDOCARE    30 patch    Place 2 patches onto the skin every 24 hours    Other secondary scoliosis, thoracic region       menthol 5 % Ptch    ICY HOT    15 each    Apply 1 patch topically every 8 hours as needed for muscle soreness    Other secondary scoliosis, thoracic region       oxyCODONE IR 10 MG tablet    ROXICODONE    60 tablet    Take 1-1.5 tablets (10-15 mg) by mouth every 4-6 hours as needed for moderate to severe pain, wean as tolerated.    History of thoracic spinal fusion       polyethylene glycol Packet    MIRALAX/GLYCOLAX    24 packet    Take 17 g by mouth daily    Other secondary scoliosis, thoracic region       senna-docusate  8.6-50 MG per tablet    SENOKOT-S;PERICOLACE    30 tablet    Take 1 tablet by mouth 2 times daily    Other secondary scoliosis, thoracic region

## 2018-11-04 DIAGNOSIS — Z98.1 HISTORY OF THORACIC SPINAL FUSION: ICD-10-CM

## 2018-11-04 DIAGNOSIS — M41.54 OTHER SECONDARY SCOLIOSIS, THORACIC REGION: ICD-10-CM

## 2018-11-05 RX ORDER — DIAZEPAM 5 MG
5 TABLET ORAL EVERY 6 HOURS PRN
Qty: 40 TABLET | Refills: 0 | Status: SHIPPED | OUTPATIENT
Start: 2018-11-05

## 2018-11-05 RX ORDER — HYDROXYZINE HYDROCHLORIDE 25 MG/1
25 TABLET, FILM COATED ORAL EVERY 6 HOURS PRN
Qty: 60 TABLET | Refills: 0 | Status: SHIPPED | OUTPATIENT
Start: 2018-11-05 | End: 2018-11-26

## 2018-11-05 RX ORDER — OXYCODONE HYDROCHLORIDE 10 MG/1
TABLET ORAL
Qty: 60 TABLET | Refills: 0 | Status: SHIPPED | OUTPATIENT
Start: 2018-11-05 | End: 2018-11-14

## 2018-11-14 ENCOUNTER — MYC REFILL (OUTPATIENT)
Dept: INTERNAL MEDICINE | Facility: CLINIC | Age: 28
End: 2018-11-14

## 2018-11-14 ENCOUNTER — THERAPY VISIT (OUTPATIENT)
Dept: PHYSICAL THERAPY | Facility: CLINIC | Age: 28
End: 2018-11-14
Payer: COMMERCIAL

## 2018-11-14 DIAGNOSIS — Z98.1 HISTORY OF THORACIC SPINAL FUSION: ICD-10-CM

## 2018-11-14 DIAGNOSIS — M41.54 OTHER SECONDARY SCOLIOSIS, THORACIC REGION: Primary | ICD-10-CM

## 2018-11-14 DIAGNOSIS — E55.9 VITAMIN D DEFICIENCY: ICD-10-CM

## 2018-11-14 DIAGNOSIS — Z47.89 ORTHOPEDIC AFTERCARE: ICD-10-CM

## 2018-11-14 PROCEDURE — 97530 THERAPEUTIC ACTIVITIES: CPT | Mod: GP | Performed by: PHYSICAL THERAPIST

## 2018-11-14 PROCEDURE — 97110 THERAPEUTIC EXERCISES: CPT | Mod: GP | Performed by: PHYSICAL THERAPIST

## 2018-11-14 RX ORDER — OXYCODONE HYDROCHLORIDE 10 MG/1
TABLET ORAL
Qty: 60 TABLET | Refills: 0 | Status: SHIPPED | OUTPATIENT
Start: 2018-11-14

## 2018-11-14 NOTE — TELEPHONE ENCOUNTER
Refilled per standing order.  Surg. Done 10/15/18.  Will  at [facility].

## 2018-11-14 NOTE — TELEPHONE ENCOUNTER
Cholecalciferol (DIALYVITE VITAMIN D3 MAX) 11013 units TABS  Last Written Prescription Date:  5/24/18  Last Fill Quantity: 4 tab,   # refills: 0  Last Office Visit : 2/14/17  Future Office visit:  None    Scheduling has been notified to contact the pt for appointment.    Routing  Because: high dose med, past due for appt.

## 2018-11-14 NOTE — TELEPHONE ENCOUNTER
Message from [place]:  Brianna Saleh, CMA Wed Nov 14, 2018 7:14 AM        ----- Message -----   From: Jonathon Conde   Sent: 11/14/2018 12:05 AM   To: [facility] Nursing Staff-  Subject: Medication Renewal Request     Original authorizing provider: MD Jonathon Webb would like a refill of the following medications:  Cholecalciferol (DIALYVITE VITAMIN D3 MAX) 08950 units TABS [Kyara Vallejo MD]    Preferred pharmacy: [facility] [address] [place] [address] AT [address]    Comment:      Medication renewals requested in this message routed to other providers:  oxyCODONE IR (ROXICODONE) 10 MG tablet [Patrick Xavier MD]  diazepam (VALIUM) 5 MG tablet [Patrick Xavier MD]

## 2018-11-18 ENCOUNTER — MYC MEDICAL ADVICE (OUTPATIENT)
Dept: ORTHOPEDICS | Facility: CLINIC | Age: 28
End: 2018-11-18

## 2018-11-25 ENCOUNTER — MYC REFILL (OUTPATIENT)
Dept: ORTHOPEDICS | Facility: CLINIC | Age: 28
End: 2018-11-25

## 2018-11-25 DIAGNOSIS — M41.54 OTHER SECONDARY SCOLIOSIS, THORACIC REGION: ICD-10-CM

## 2018-11-25 DIAGNOSIS — Z98.1 HISTORY OF THORACIC SPINAL FUSION: ICD-10-CM

## 2018-11-25 RX ORDER — OXYCODONE HYDROCHLORIDE 10 MG/1
TABLET ORAL
Qty: 60 TABLET | Refills: 0 | Status: CANCELLED | OUTPATIENT
Start: 2018-11-25

## 2018-11-26 RX ORDER — GABAPENTIN 300 MG/1
300 CAPSULE ORAL EVERY 8 HOURS
Qty: 90 CAPSULE | Refills: 1 | Status: SHIPPED | OUTPATIENT
Start: 2018-11-26 | End: 2019-01-07

## 2018-11-26 RX ORDER — HYDROXYZINE HYDROCHLORIDE 25 MG/1
25 TABLET, FILM COATED ORAL EVERY 6 HOURS PRN
Qty: 90 TABLET | Refills: 1 | Status: SHIPPED | OUTPATIENT
Start: 2018-11-26 | End: 2019-01-07

## 2018-11-26 RX ORDER — OXYCODONE HYDROCHLORIDE 5 MG/1
TABLET ORAL
Qty: 90 TABLET | Refills: 0 | Status: SHIPPED | OUTPATIENT
Start: 2018-11-26

## 2018-11-28 DIAGNOSIS — M41.9 SCOLIOSIS: Primary | ICD-10-CM

## 2018-11-28 NOTE — PROGRESS NOTES
"[facility]  Orthopedics  Patrick Xavier MD  2018     Name: Jonathon Conde  MRN: [id number]  Age: 27 year old  : 1990  Referring provider: Referred Self     Chief Complaint:   Surgical Followup (DOS: 10/15/18, PSF Thoracic for scoliosis)       Date of Surgery: 10/15/18    Procedure: Spinal Fusion Thoracic 1/2- Thoracic 12, Camargo Lua Osteotomies Thoracic 2/3- Thoracic 10 - Wound Class: I-Clean.     History of Present Illness:   Jonathon Conde is a 27 year old male 6 weeks status-post the above mentioned procedure who presents for postoperative evaluation. Today, he reports that he is doing well overall. Occasionally has pain when sleeping and standing for a prolonged period of time. Is only using pain medication while sleeping and does not take medication during the day. He feels content with his surgery and feels that most of his discomfort from prior to surgery has been relieved. He has no other concerns today.     On note: Patient will be traveling to [place] in 3 weeks for the wedding.     ZOHAIB: N/A  Utlsgu49: 24  Pain scale: 3.5      Physical Examination:  Ht 1.905 m (6' 3\")  Wt 86.6 kg (191 lb)  BMI 23.87 kg/m2  Constitutional - Patient is healthy, well-nourished and appears stated age.  Respiratory - Patient is breathing normally and in no respiratory distress.  Skin - small stitch abscess in haling process Surgical incisions are well-healed.   Psychiatric - Normal mood and affect.  Cardiovascular - Peripheral pulses are normal.  Eyes - Visual acuity is normal to the written word.  ENT - Hearing intact to the spoken word.  Musculoskeletal - Non-antalgic gait without use of assistive devices.      Radiographs:   Radiographs of the spine complete scoliosis - 2 views (2018):  Instrumentation in place.  excellent coronal and sagittal correction.    I have independently reviewed the above imaging studies; the results were discussed with the patient.     Assessment:   27 year old male 6 " weeks s/p the aforementioned procedure. Progressing appropriately.     Plan:   1. Patient may increase activities as tolerated. Able to lift 20-40 pounds.  2. Follow-up in 6 weeks.        Scribe Disclosure:   I, Hitesh Miller, am serving as a scribe to document services personally performed by Patrick Xavier MD at this visit, based upon the provider's statements to me. All documentation has been reviewed by the aforementioned provider prior to being entered into the official medical record.     Patrick Xavier MD

## 2018-11-29 ENCOUNTER — OFFICE VISIT (OUTPATIENT)
Dept: ORTHOPEDICS | Facility: CLINIC | Age: 28
End: 2018-11-29
Payer: COMMERCIAL

## 2018-11-29 ENCOUNTER — RADIANT APPOINTMENT (OUTPATIENT)
Dept: GENERAL RADIOLOGY | Facility: CLINIC | Age: 28
End: 2018-11-29
Attending: ORTHOPAEDIC SURGERY
Payer: COMMERCIAL

## 2018-11-29 VITALS — WEIGHT: 191 LBS | BODY MASS INDEX: 23.75 KG/M2 | HEIGHT: 75 IN

## 2018-11-29 DIAGNOSIS — M41.9 SCOLIOSIS: ICD-10-CM

## 2018-11-29 DIAGNOSIS — M41.54 OTHER SECONDARY SCOLIOSIS, THORACIC REGION: Primary | ICD-10-CM

## 2018-11-29 NOTE — LETTER
"2018       RE: Jonathon Conde  [address]  [place] [address]     Dear Colleague,    Thank you for referring your patient, Jonathon Conde, to the [facility]. Please see a copy of my visit note below.    [facility]  Orthopedics  Patrick Xavier MD  2018     Name: Jonathon Conde  MRN: [id number]  Age: 27 year old  : 1990  Referring provider: Referred Self     Chief Complaint:   Surgical Followup (DOS: 10/15/18, PSF Thoracic for scoliosis)       Date of Surgery: 10/15/18    Procedure: Spinal Fusion Thoracic 1/2- Thoracic 12, Cam[place] Lua Osteotomies Thoracic 2/3- Thoracic 10 - Wound Class: I-Clean.     History of Present Illness:   Jonathon Conde is a 27 year old male 6 weeks status-post the above mentioned procedure who presents for postoperative evaluation. Today, he reports that he is doing well overall. Occasionally has pain when sleeping and standing for a prolonged period of time. Is only using pain medication while sleeping and does not take medication during the day. He feels content with his surgery and feels that most of his discomfort from prior to surgery has been relieved. He has no other concerns today.     On note: Patient will be traveling to [place] in 3 weeks for the wedding.     ZOHAIB: N/A  Irpxeo88: 24  Pain scale: 3.5      Physical Examination:  Ht 1.905 m (6' 3\")  Wt 86.6 kg (191 lb)  BMI 23.87 kg/m2  Constitutional - Patient is healthy, well-nourished and appears stated age.  Respiratory - Patient is breathing normally and in no respiratory distress.  Skin - small stitch abscess in haling process Surgical incisions are well-healed.   Psychiatric - Normal mood and affect.  Cardiovascular - Peripheral pulses are normal.  Eyes - Visual acuity is normal to the written word.  ENT - Hearing intact to the spoken word.  Musculoskeletal - Non-antalgic gait without use of assistive " devices.      Radiographs:   Radiographs of the spine complete scoliosis - 2 views (11/29/2018):  Instrumentation in place.  excellent coronal and sagittal correction.    I have independently reviewed the above imaging studies; the results were discussed with the patient.     Assessment:   27 year old male 6 weeks s/p the aforementioned procedure. Progressing appropriately.     Plan:   1. Patient may increase activities as tolerated. Able to lift 20-40 pounds.  2. Follow-up in 6 weeks.        Scribe Disclosure:   I, Hitesh Miller, am serving as a scribe to document services personally performed by Patrick Xavier MD at this visit, based upon the provider's statements to me. All documentation has been reviewed by the aforementioned provider prior to being entered into the official medical record.     Patrick Xavier MD

## 2018-11-29 NOTE — NURSING NOTE
"Reason For Visit:   Chief Complaint   Patient presents with     Surgical Followup     DOS: 10/15/18, PSF Thoracic for scoliosis       Primary MD: Kyara Quiñones  Ref. MD: Dr. Barrientos        Occupation ..  Currently working? Yes.  Work status?  Full time.  Date of injury: none     Date of surgery: none     Smoker: NO    Ht 1.905 m (6' 3\")  Wt 86.6 kg (191 lb)  BMI 23.87 kg/m2    Pain Assessment  Patient Currently in Pain: Yes  0-10 Pain Scale: 1  Primary Pain Location: Back  Pain Orientation: Upper  Pain Descriptors: Burning, Intermittent, Sharp  Aggravating Factors: Standing, Sitting, Lying (staying still for extended periods)    Oswestry (ZOHAIB) Questionnaire    No flowsheet data found.         Neck Disability Index (NDI) Questionnaire    No flowsheet data found.           Visual Analog Pain Scale  Back Pain Scale 0-10: 3.5  Right leg pain: 0  Left leg pain: 0  Neck Pain Scale 0-10: 0  Right arm pain: 0  Left arm pain: 0    Promis 10 Assessment    PROMIS 10 11/28/2018   In general, would you say your health is: Good   In general, would you say your quality of life is: Good   In general, how would you rate your physical health? Good   In general, how would you rate your mental health, including your mood and your ability to think? Good   In general, how would you rate your satisfaction with your social activities and relationships? Good   In general, please rate how well you carry out your usual social activities and roles Good   To what extent are you able to carry out your everyday physical activities such as walking, climbing stairs, carrying groceries, or moving a chair? Mostly   How often have you been bothered by emotional problems such as feeling anxious, depressed or irritable? Often   How would you rate your fatigue on average? Moderate   How would you rate your pain on average?   0 = No Pain  to  10 = Worst Imaginable Pain 4   Global Physical Health Score : Raw Score -   Global Mental " Health Score : Raw Score -   Total (Physical + Mental Health Score) -   In general, would you say your health is: 3   In general, would you say your quality of life is: 3   In general, how would you rate your physical health? 3   In general, how would you rate your mental health, including your mood and your ability to think? 3   In general, how would you rate your satisfaction with your social activities and relationships? 3   In general, please rate how well you carry out your usual social activities and roles. (This includes activities at home, at work and in your community, and responsibilities as a parent, child, spouse, employee, friend, etc.) 3   To what extent are you able to carry out your everyday physical activities such as walking, climbing stairs, carrying groceries, or moving a chair? 4   In the past 7 days, how often have you been bothered by emotional problems such as feeling anxious, depressed, or irritable? 4   In the past 7 days, how would you rate your fatigue on average? 3   In the past 7 days, how would you rate your pain on average, where 0 means no pain, and 10 means worst imaginable pain? 4   Global Mental Health Score 11   Global Physical Health Score 13   PROMIS TOTAL - SUBSCORES 24   Some recent data might be hidden                Melissa Vallejo, ATC

## 2018-11-29 NOTE — MR AVS SNAPSHOT
After Visit Summary   11/29/2018    Jonathon Conde    MRN: [id number]           Patient Information     Date Of Birth          1990        Visit Information        Provider Department      11/29/2018 9:00 AM Patrick Xavier MD [facility]        Today's Diagnoses     Other secondary scoliosis, thoracic region    -  1       Follow-ups after your visit        Your next 10 appointments already scheduled     Dec 04, 2018 11:00 AM CST   [facility] with Alexandra Berry PT   [facility] ([facility])    [address] [place] [address] [address] [facility] [address] [phone]            Jan 09, 2019  2:30 PM CST   (Arrive by 2:00 PM)   RETURN SCOLIOSIS with Patrick Xavier MD   [facility] ([facility])    [address] [place] [address]  [address]th [facility] [address] [phone]              Who to contact     Please call your clinic at [phone] to:    Ask questions about your health    Make or cancel appointments    Discuss your medicines    Learn about your test results    Speak to your doctor            Additional Information About Your Visit        [employer] Information     [employer] gives you secure access to your electronic health record. If you see a primary care provider, you can also send messages to your care team and make appointments. If you have questions, please call your primary care clinic.  If you do not have a primary care provider, please call [phone] and they will assist you.      [employer] is an electronic gateway that provides easy, online access to your medical records. With [employer], you can request a clinic appointment, read your test results, renew a prescription or communicate with your care team.     To access your existing account, please contact your [facility] or call [phone] for assistance.        Care EveryWhere ID     This is your  "Care EveryWhere ID. This could be used by other organizations to access your [facility] medical records  [id number]        Your Vitals Were     Height BMI (Body Mass Index)                1.905 m (6' 3\") 23.87 kg/m2           Blood Pressure from Last 3 Encounters:   10/19/18 (!) 151/97   10/05/18 127/55   05/21/18 116/81    Weight from Last 3 Encounters:   11/29/18 86.6 kg (191 lb)   10/15/18 86.8 kg (191 lb 4.8 oz)   10/05/18 85.5 kg (188 lb 8 oz)              Today, you had the following     No orders found for display       Primary Care Provider Office Phone # Fax #    Kyara Denise Vallejo [phone] [address] [facility] [address]        Equal Access to Services     [facility] : Hadii lucero nova hadasho Soalba, waaxda luqadaha, qaybta kaalmada adeegyada, wyatt keita hayluca arita . So [facility] [phone].    ATENCIÓN: Si habla español, tiene a perrin disposición servicios gratuitos de asistencia lingüística. Llame al [phone].    We comply with applicable federal civil rights laws and [place] laws. We do not discriminate on the basis of race, color, national origin, age, disability, sex, sexual orientation, or gender identity.            Thank you!     Thank you for choosing [facility]  for your care. Our goal is always to provide you with excellent care. Hearing back from our patients is one way we can continue to improve our services. Please take a few minutes to complete the written survey that you may receive in the mail after your visit with us. Thank you!             Your Updated Medication List - Protect others around you: Learn how to safely use, store and throw away your medicines at www.disposemymeds.org.          This list is accurate as of 11/29/18 10:00 AM.  Always use your most recent med list.                   Brand Name Dispense Instructions for use Diagnosis    Cholecalciferol 57176 units Tabs    DIALYVITE VITAMIN D3 MAX    4 " tablet    Take 50,000 Units by mouth once a week For one month    Vitamin D deficiency       diazepam 5 MG tablet    VALIUM    40 tablet    Take 1 tablet (5 mg) by mouth every 6 hours as needed for muscle spasms    Other secondary scoliosis, thoracic region       gabapentin 300 MG capsule    NEURONTIN    90 capsule    Take 1 capsule (300 mg) by mouth every 8 hours    Other secondary scoliosis, thoracic region       hydrOXYzine 25 MG tablet    ATARAX    90 tablet    Take 1 tablet (25 mg) by mouth every 6 hours as needed (adjuvant pain and muscle spasms)    Other secondary scoliosis, thoracic region       Lidocaine 4 % Patch    LIDOCARE    30 patch    Place 2 patches onto the skin every 24 hours    Other secondary scoliosis, thoracic region       menthol 5 % Ptch    ICY HOT    15 each    Apply 1 patch topically every 8 hours as needed for muscle soreness    Other secondary scoliosis, thoracic region       * oxyCODONE IR 10 MG tablet    ROXICODONE    60 tablet    Take 1-1.5 tablets (10-15 mg) by mouth every 4-6 hours as needed for moderate to severe pain, wean as tolerated.    History of thoracic spinal fusion       * oxyCODONE 5 MG tablet    ROXICODONE    90 tablet    Take 1-2 tabs every 4-6 hours as needed for pain    Other secondary scoliosis, thoracic region       polyethylene glycol packet    MIRALAX/GLYCOLAX    24 packet    Take 17 g by mouth daily    Other secondary scoliosis, thoracic region       senna-docusate 8.6-50 MG tablet    SENOKOT-S/PERICOLACE    30 tablet    Take 1 tablet by mouth 2 times daily    Other secondary scoliosis, thoracic region       * Notice:  This list has 2 medication(s) that are the same as other medications prescribed for you. Read the directions carefully, and ask your doctor or other care provider to review them with you.

## 2018-12-04 ENCOUNTER — THERAPY VISIT (OUTPATIENT)
Dept: PHYSICAL THERAPY | Facility: CLINIC | Age: 28
End: 2018-12-04
Payer: COMMERCIAL

## 2018-12-04 DIAGNOSIS — Z47.89 ORTHOPEDIC AFTERCARE: ICD-10-CM

## 2018-12-04 DIAGNOSIS — M41.54 OTHER SECONDARY SCOLIOSIS, THORACIC REGION: ICD-10-CM

## 2018-12-04 PROCEDURE — 97530 THERAPEUTIC ACTIVITIES: CPT | Mod: GP | Performed by: PHYSICAL THERAPIST

## 2018-12-04 PROCEDURE — 97110 THERAPEUTIC EXERCISES: CPT | Mod: GP | Performed by: PHYSICAL THERAPIST

## 2018-12-10 ENCOUNTER — OFFICE VISIT (OUTPATIENT)
Dept: INTERNAL MEDICINE | Facility: CLINIC | Age: 28
End: 2018-12-10
Payer: COMMERCIAL

## 2018-12-10 VITALS
HEART RATE: 81 BPM | WEIGHT: 181.6 LBS | DIASTOLIC BLOOD PRESSURE: 49 MMHG | SYSTOLIC BLOOD PRESSURE: 115 MMHG | OXYGEN SATURATION: 97 % | BODY MASS INDEX: 22.7 KG/M2

## 2018-12-10 DIAGNOSIS — Z71.84 COUNSELING FOR TRAVEL: Primary | ICD-10-CM

## 2018-12-10 RX ORDER — ATOVAQUONE AND PROGUANIL HYDROCHLORIDE 250; 100 MG/1; MG/1
1 TABLET, FILM COATED ORAL DAILY
Qty: 19 TABLET | Refills: 0 | Status: SHIPPED | OUTPATIENT
Start: 2018-12-10 | End: 2019-12-10

## 2018-12-10 NOTE — PROGRESS NOTES
[facility]  Primary Care Center   Funmilayo Norman, BECKI HATHAWAY  12/10/2018      Chief Complaint:   Travel Clinic       History of Present Illness:   Jonathon Conde is a 28 year old male who presents for travel clinic.  The patient reports he will be traveling to [facility] for a wedding and then [place]  He [place] and heard back today that he does not need yellow fever vaccination to travel there.  He is leaving 12/14/18.  He has been generally healthy and denies any recent illnesses.    Immunization History   Administered Date(s) Administered     DTAP (<7y) 02/02/1990, 03/30/1990, 07/06/1990, 12/12/1991, 05/02/1995     HepB 12/19/1990, 03/12/1991, 07/19/2001     Influenza Vaccine IM 3yrs+ 4 Valent IIV4 10/19/2018     MMR 03/12/1991, 12/31/1998     OPV, monovalent, unspecified 02/02/1990, 03/30/1990, 12/20/1991, 05/02/1995     TD (ADULT, 7+) 08/19/2002     TDAP Vaccine (Boostrix) 01/30/2017      Review of Systems:   Pertinent items are noted in HPI, remainder of complete ROS is negative.      Active Medications:       Cholecalciferol (DIALYVITE VITAMIN D3 MAX) 68168 units TABS, Take 50,000 Units by mouth once a week For one month, Disp: 4 tablet, Rfl: 0     diazepam (VALIUM) 5 MG tablet, Take 1 tablet (5 mg) by mouth every 6 hours as needed for muscle spasms, Disp: 40 tablet, Rfl: 0     gabapentin (NEURONTIN) 300 MG capsule, Take 1 capsule (300 mg) by mouth every 8 hours, Disp: 90 capsule, Rfl: 1     hydrOXYzine (ATARAX) 25 MG tablet, Take 1 tablet (25 mg) by mouth every 6 hours as needed (adjuvant pain and muscle spasms), Disp: 90 tablet, Rfl: 1     oxyCODONE (ROXICODONE) 5 MG tablet, Take 1-2 tabs every 4-6 hours as needed for pain, Disp: 90 tablet, Rfl: 0     Allergies:   Sulfa drugs      Past Medical History:  Chronic back pain  Scoliosis      Past Surgical History:  Posterior thoracic spinal fusion 10/15/18  [place] teeth  Clavicle  surgery    Family History:   Mental illness - maternal grandmother, mother  Substance abuse - mother     Social History:   Presents to clinic alone.  Tobacco Use: Former smoker, 3.25 pack year history, quit 0.8.   Alcohol Use: 6 cans of beer per week  Drug use: Marijuana use.   PCP: Kyara Vallejo      Physical Exam:   /49   Pulse 81   Wt 82.4 kg (181 lb 9.6 oz)   SpO2 97%   BMI 22.70 kg/m       Constitutional: Alert, oriented, pleasant, no acute distress     Assessment and Plan:  Counseling for travel  CDC guidelines reviewed.  Yellow fever is not indicated in his travel area.  Typhoid discussed - patient will be [place] and is aware of transmission risks.  Typhoid deferred as he is leaving in only 3 days and is comfortable not being vaccinated for this.  Malaria prophylaxis provided. HepA vaccination provided, pt tolerated well. Reviewed general travel safety.   - atovaquone-proguanil (MALARONE) 250-100 MG tablet  Dispense: 19 tablet; Refill: 0        Scribe Disclosure:   I, Jacki Jin, am serving as a scribe to document services personally performed by BECKI Kelsey CNP at this visit, based upon the provider's statements to me. All documentation has been reviewed by the aforementioned provider prior to being entered into the official medical record.     Portions of this medical record were completed by a scribe. UPON MY REVIEW AND AUTHENTICATION BY ELECTRONIC SIGNATURE, this confirms (a) I performed the applicable clinical services, and (b) the record is accurate.      BECKI Kelsey CNP

## 2018-12-10 NOTE — NURSING NOTE
Chief Complaint   Patient presents with     Travel Clinic     Pt is here to discuss immunizations needed for [facility]        Brittany Rhoades, Clinic EMT at 5:54 PM on 12/10/2018

## 2018-12-11 NOTE — PATIENT INSTRUCTIONS
Patient Education     Atovaquone, Proguanil Hydrochloride Oral tablet  What is this medicine?  ATOVAQUONE; PROGUANIL (a TOE va kwone; pro GWAN il) is an antimalarial agent. It is used to prevent and to treat malaria infections.  This medicine may be used for other purposes; ask your health care provider or pharmacist if you have questions.  What should I tell my health care provider before I take this medicine?  They need to know if you have any of these conditions:    kidney disease    liver disease    stomach problems    an unusual or allergic reaction to atovaquone, proguanil, other medicines, foods, dyes, or preservatives    pregnant or trying to get pregnant    breast-feeding  How should I use this medicine?  Take this medicine by mouth with a glass of water. Follow the directions on the prescription label. Take this medicine at the same time each day with food or a milky drink. The tablets may be crushed and mixed with condensed milk just before the dose. If you vomit within 1 hour after taking your dose, take your dose again. Take all of your medicine as directed even if you think you are better. Do not skip doses or stop your medicine early. To prevent malaria, take this medicine daily starting 1 or 2 days before entering the area, and continue for 7 days after leaving.  Talk to your pediatrician regarding the use of this medicine in children. While this drug may be prescribed for children for selected conditions, precautions do apply.  Overdosage: If you think you have taken too much of this medicine contact a poison control center or emergency room at once.  NOTE: This medicine is only for you. Do not share this medicine with others.  What if I miss a dose?  If you miss a dose, take it as soon as you can. If it is almost time for your next dose, take only that dose. Do not take double or extra doses.  What may interact with this medicine?    metoclopramide    rifabutin    rifampin    tetracycline  This  list may not describe all possible interactions. Give your health care provider a list of all the medicines, herbs, non-prescription drugs, or dietary supplements you use. Also tell them if you smoke, drink alcohol, or use illegal drugs. Some items may interact with your medicine.  What should I watch for while using this medicine?  If you get a fever during or after you are in a malaria-endemic area, call your doctor. Tell your doctor that you may have been exposed to malaria.  This medicine can make you more sensitive to the sun. Keep out of the sun. If you cannot avoid being in the sun, wear protective clothing and use sunscreen. Do not use sun lamps or tanning beds/booths.  While in areas where malaria is common, take steps to prevent mosquito bites.  1. Stay in air-conditioned or well-screened rooms to reduce human-mosquito contact.  2. Sleep under mosquito netting, preferably one with pyrethrum-containing insecticide.  3. Wear long-sleeved shirts or blouses and long trousers to protect arms and legs.  4. Apply mosquito repellents containing DEET to uncovered areas of skin.  5. Use a pyrethrum-containing flying insect spray to kill mosquitoes.  What side effects may I notice from receiving this medicine?  Side effects that you should report to your doctor or health care professional as soon as possible:    allergic reactions like skin rash, itching or hives, swelling of the face, lips, or tongue    breathing problems    changes in vision    fever or infection    redness, blistering, peeling or loosening of the skin, including inside the mouth    unusually weak or tired  Side effects that usually do not require medical attention (report to your doctor or health care professional if they continue or are bothersome):    cough    diarrhea    dizziness    headache    loss of appetite    nausea, vomiting    stomach pain    trouble sleeping  This list may not describe all possible side effects. Call your doctor for  medical advice about side effects. You may report side effects to FDA at 2-637-XIT-1620.  Where should I keep my medicine?  Keep out of the reach of children.  Store at room temperature between 15 and 30 degrees C (59 and 86 degrees F). Throw away any unused medicine after the expiration date.  NOTE:This sheet is a summary. It may not cover all possible information. If you have questions about this medicine, talk to your doctor, pharmacist, or health care provider. Copyright  2016 Gold Standard

## 2019-01-07 DIAGNOSIS — M41.54 OTHER SECONDARY SCOLIOSIS, THORACIC REGION: ICD-10-CM

## 2019-01-07 RX ORDER — GABAPENTIN 300 MG/1
300 CAPSULE ORAL EVERY 8 HOURS
Qty: 90 CAPSULE | Refills: 0 | Status: SHIPPED | OUTPATIENT
Start: 2019-01-07

## 2019-01-07 RX ORDER — HYDROXYZINE HYDROCHLORIDE 25 MG/1
25 TABLET, FILM COATED ORAL EVERY 6 HOURS PRN
Qty: 90 TABLET | Refills: 1 | Status: SHIPPED | OUTPATIENT
Start: 2019-01-07

## 2019-01-07 RX ORDER — HYDROCODONE BITARTRATE AND ACETAMINOPHEN 5; 325 MG/1; MG/1
1 TABLET ORAL EVERY 6 HOURS PRN
Qty: 90 TABLET | Refills: 0 | Status: SHIPPED | OUTPATIENT
Start: 2019-01-07 | End: 2019-01-10

## 2019-01-07 NOTE — TELEPHONE ENCOUNTER
DOS Oct spine.  States only uses Oxycodone 5mg at HS when rates pain 6-7 & needs it for sleep. Doing  PT.    Ran out of Oxycodone Sat 1-15-19.    Returns to work this week. NO lifiting >20#.  Pt. Agreed. To switch down to [place] & refilled Neurontin & Atarax.  Will  scripts. RTC appt. This week 1-9-19.  Call back prn.  Pt. Agreed.  S.O./Stefan Talamantes RN.

## 2019-01-08 DIAGNOSIS — M41.9 SCOLIOSIS: Primary | ICD-10-CM

## 2019-01-09 ENCOUNTER — ANCILLARY PROCEDURE (OUTPATIENT)
Dept: GENERAL RADIOLOGY | Facility: CLINIC | Age: 29
End: 2019-01-09
Payer: COMMERCIAL

## 2019-01-09 ENCOUNTER — OFFICE VISIT (OUTPATIENT)
Dept: ORTHOPEDICS | Facility: CLINIC | Age: 29
End: 2019-01-09
Payer: COMMERCIAL

## 2019-01-09 VITALS — WEIGHT: 180 LBS | BODY MASS INDEX: 22.5 KG/M2

## 2019-01-09 DIAGNOSIS — Z98.1 S/P SPINAL FUSION: Primary | ICD-10-CM

## 2019-01-09 DIAGNOSIS — M41.9 SCOLIOSIS: ICD-10-CM

## 2019-01-09 ASSESSMENT — ENCOUNTER SYMPTOMS
JOINT SWELLING: 0
DECREASED CONCENTRATION: 0
NERVOUS/ANXIOUS: 1
BACK PAIN: 1
DEPRESSION: 1
MUSCLE CRAMPS: 0
MUSCLE WEAKNESS: 0
STIFFNESS: 0
PANIC: 0
NECK PAIN: 0
INSOMNIA: 0
ARTHRALGIAS: 1
MYALGIAS: 1

## 2019-01-09 NOTE — NURSING NOTE
"Reason For Visit:   Chief Complaint   Patient presents with     Spine - Surgical Followup     Surgical Followup     8 wk pop DOS 10/15/18 PSF thoracic       Primary MD: Kyara Quiñones  Ref. MD: Dr. Barrientos        Occupation ..  Currently working? Yes.  Work status?  Full time.  Date of injury: none     Date of surgery: 10/15/18   Type of Surgery:1.  Posterior spinal fusion T2 to T10.   2.  Segmental spinal instrumentation T2 to T10.   3.  Camargo-Lua osteotomies T3 to T4, T4 to T5, T5 to T6, T6 to 7, T7 to T8, T8 TO t9.   4.  Image-guided surgery.      Smoker: NO        Wt 81.6 kg (180 lb)   BMI 22.50 kg/m      Pain Assessment  Patient Currently in Pain: No    Only has pain when settling down to go to bed at night in mid thoracic \"feels like a tight muscle\". Tries to do stretches.     Oswestry (ZOHAIB) Questionnaire    OSWESTRY DISABILITY INDEX 1/9/2019   Count 10   Sum 9   Oswestry Score (%) 18   Some recent data might be hidden            Neck Disability Index (NDI) Questionnaire    No flowsheet data found.           Visual Analog Pain Scale  Back Pain Scale 0-10: 0  Right leg pain: 0  Left leg pain: 0    Promis 10 Assessment    PROMIS 10 1/9/2019   In general, would you say your health is: Very good   In general, would you say your quality of life is: Good   In general, how would you rate your physical health? Good   In general, how would you rate your mental health, including your mood and your ability to think? Fair   In general, how would you rate your satisfaction with your social activities and relationships? Good   In general, please rate how well you carry out your usual social activities and roles Good   To what extent are you able to carry out your everyday physical activities such as walking, climbing stairs, carrying groceries, or moving a chair? Completely   How often have you been bothered by emotional problems such as feeling anxious, depressed or irritable? Sometimes   How " would you rate your fatigue on average? Mild   How would you rate your pain on average?   0 = No Pain  to  10 = Worst Imaginable Pain 5   Global Physical Health Score : Raw Score -   Global Mental Health Score : Raw Score -   Total (Physical + Mental Health Score) -   In general, would you say your health is: 4   In general, would you say your quality of life is: 3   In general, how would you rate your physical health? 3   In general, how would you rate your mental health, including your mood and your ability to think? 2   In general, how would you rate your satisfaction with your social activities and relationships? 3   In general, please rate how well you carry out your usual social activities and roles. (This includes activities at home, at work and in your community, and responsibilities as a parent, child, spouse, employee, friend, etc.) 3   To what extent are you able to carry out your everyday physical activities such as walking, climbing stairs, carrying groceries, or moving a chair? 5   In the past 7 days, how often have you been bothered by emotional problems such as feeling anxious, depressed, or irritable? 3   In the past 7 days, how would you rate your fatigue on average? 2   In the past 7 days, how would you rate your pain on average, where 0 means no pain, and 10 means worst imaginable pain? 5   Global Mental Health Score 11   Global Physical Health Score 15   PROMIS TOTAL - SUBSCORES 26   Some recent data might be hidden                Melissa Vallejo, ATC

## 2019-01-09 NOTE — PROGRESS NOTES
"REASON FOR VISIT: S/P posterior spinal fusion    REFERRING PHYSICIAN: Referred Self     PRIMARY CARE PHYSICIAN: Kyara Quiñones    HISTORY OF PRESENT ILLNESS: Jnoathon Conde is a 28 year old male who returns to clinic today for his second post operative appointment following posterior spinal, T2-T10, which was performed by Dr. Xavier on 10/15/18.  Overall, he reports that he is doing very well.  He recently got back from [place], and he notes that the humidity caused an increase in pain.  He also found that he was more worn out, but he was very active with things like swimming.  He describes his pain as \"muscle aches\".  He continues to take one vicodin per day as needed. He is currently doing physical therapy here, and he feels that he is making progress.     Oswestry score: 18%  Otyddv67: 26 (previously 24)  Pain scale: 0 (previously 3.5)    PHYSICAL EXAM:   Constitutional - Patient is healthy, well-nourished and appears stated age.    BMI = 22.50    Respiratory - Patient is breathing normally and in no respiratory distress.   Skin - No suspicious rashes or lesions.  Surgical incision is well healed.   Psychiatric - Normal mood and affect.   Cardiovascular - Extremities are warm and well perfused.   Eyes - Visual acuity is normal to the written word.   ENT - Hearing intact to the spoken word.   GI - No abdominal distention.   Musculoskeletal - Non-antalgic gait without use of assistive devices.          Thoracic Spine:    Appearance - Stands with head centered above the pelvis    Palpation - Non-tender to palpation    Strength - Normal lower extremity strength     Neurologic - Gross motor intact..        IMAGING: Radiographs of the full spine were obtained today.  They show instrumentation that is intact without evidence of loosening, fracture or migration. See full radiologic report in chart.    CLINICAL ASSESSMENT:   S/P posterior spinal fusion    DISCUSSION/PLAN:   Jonathon is a 28 year old male who " returned to clinic today for his second post operative appointment following posterior spinal fusion, which was performed by Dr. Xavier on 10/15/18.  He is progressing very well.  Radiographs were obtained today, and they show instrumentation that remains intact without evidence of failure.  At this point, he can continue physical therapy as long as he feels he is getting benefit from it.  He can resume normal activities as tolerated.  He should return to clinic again in three months for repeat xrays and further evaluation.    All questions and concerns were answered to the patient's apparent satisfaction before leaving the clinic.     Thank you for allowing Dr. Xavier and I to participate in the care of Jonathon Conde.    Respectfully,  Rosanne Cain PA-C    CC  Copy to patient    [address]  [place] [address]  Answers for HPI/ROS submitted by the patient on 1/9/2019   General Symptoms: No  Skin Symptoms: No  HENT Symptoms: No  EYE SYMPTOMS: No  HEART SYMPTOMS: No  LUNG SYMPTOMS: No  INTESTINAL SYMPTOMS: No  URINARY SYMPTOMS: No  REPRODUCTIVE SYMPTOMS: No  SKELETAL SYMPTOMS: Yes  BLOOD SYMPTOMS: No  NERVOUS SYSTEM SYMPTOMS: No  MENTAL HEALTH SYMPTOMS: Yes  Back pain: Yes  Muscle aches: Yes  Neck pain: No  Swollen joints: No  Joint pain: Yes  Bone pain: No  Muscle cramps: No  Muscle weakness: No  Joint stiffness: No  Bone fracture: No  Nervous or Anxious: Yes  Depression: Yes  Trouble sleeping: No  Trouble thinking or concentrating: No  Mood changes: No  Panic attacks: No

## 2019-01-09 NOTE — LETTER
"1/9/2019       RE: Jonathon Conde  [address]  [place] [address]     Dear Colleague,    Thank you for referring your patient, Jonathon Conde, [facility] Please see a copy of my visit note below.    REASON FOR VISIT: S/P posterior spinal fusion    REFERRING PHYSICIAN: Referred Self     PRIMARY CARE PHYSICIAN: Kyara Quiñones    HISTORY OF PRESENT ILLNESS: Jonathon Conde is a 28 year old male who returns to clinic today for his second post operative appointment following posterior spinal, T2-T10, which was performed by Dr. Xavier on 10/15/18.  Overall, he reports that he is doing very well.  He recently got back from [place], and he notes that the humidity caused an increase in pain.  He also found that he was more worn out, but he was very active with things like swimming.  He describes his pain as \"muscle aches\".  He continues to take one vicodin per day as needed. He is currently doing physical therapy here, and he feels that he is making progress.     Oswestry score: 18%  Euhfib29: 26 (previously 24)  Pain scale: 0 (previously 3.5)    PHYSICAL EXAM:   Constitutional - Patient is healthy, well-nourished and appears stated age.    BMI = 22.50    Respiratory - Patient is breathing normally and in no respiratory distress.   Skin - No suspicious rashes or lesions.  Surgical incision is well healed.   Psychiatric - Normal mood and affect.   Cardiovascular - Extremities are warm and well perfused.   Eyes - Visual acuity is normal to the written word.   ENT - Hearing intact to the spoken word.   GI - No abdominal distention.   Musculoskeletal - Non-antalgic gait without use of assistive devices.          Thoracic Spine:    Appearance - Stands with head centered above the pelvis    Palpation - Non-tender to palpation    Strength - Normal lower extremity strength     Neurologic - Gross motor " intact..        IMAGING: Radiographs of the full spine were obtained today.  They show instrumentation that is intact without evidence of loosening, fracture or migration. See full radiologic report in chart.    CLINICAL ASSESSMENT:   S/P posterior spinal fusion    DISCUSSION/PLAN:   Jonathon is a 28 year old male who returned to clinic today for his second post operative appointment following posterior spinal fusion, which was performed by Dr. Xavier on 10/15/18.  He is progressing very well.  Radiographs were obtained today, and they show instrumentation that remains intact without evidence of failure.  At this point, he can continue physical therapy as long as he feels he is getting benefit from it.  He can resume normal activities as tolerated.  He should return to clinic again in three months for repeat xrays and further evaluation.    All questions and concerns were answered to the patient's apparent satisfaction before leaving the clinic.     Thank you for allowing Dr. Xavier and I to participate in the care of Jonathon Conde.    Respectfully,  Rosanne Cain PA-C    CC  Copy to patient    [address]  [place] [address]  Answers for HPI/ROS submitted by the patient on 1/9/2019   General Symptoms: No  Skin Symptoms: No  HENT Symptoms: No  EYE SYMPTOMS: No  HEART SYMPTOMS: No  LUNG SYMPTOMS: No  INTESTINAL SYMPTOMS: No  URINARY SYMPTOMS: No  REPRODUCTIVE SYMPTOMS: No  SKELETAL SYMPTOMS: Yes  BLOOD SYMPTOMS: No  NERVOUS SYSTEM SYMPTOMS: No  MENTAL HEALTH SYMPTOMS: Yes  Back pain: Yes  Muscle aches: Yes  Neck pain: No  Swollen joints: No  Joint pain: Yes  Bone pain: No  Muscle cramps: No  Muscle weakness: No  Joint stiffness: No  Bone fracture: No  Nervous or Anxious: Yes  Depression: Yes  Trouble sleeping: No  Trouble thinking or concentrating: No  Mood changes: No  Panic attacks: No      Again, thank you for allowing me to participate in the care of your patient.       Sincerely,    Rosanne Cain PA-C

## 2019-01-10 ENCOUNTER — THERAPY VISIT (OUTPATIENT)
Dept: PHYSICAL THERAPY | Facility: CLINIC | Age: 29
End: 2019-01-10
Payer: COMMERCIAL

## 2019-01-10 DIAGNOSIS — Z47.89 ORTHOPEDIC AFTERCARE: ICD-10-CM

## 2019-01-10 DIAGNOSIS — M41.54 OTHER SECONDARY SCOLIOSIS, THORACIC REGION: ICD-10-CM

## 2019-01-10 PROBLEM — M41.9 SCOLIOSIS DEFORMITY OF SPINE: Status: RESOLVED | Noted: 2018-10-15 | Resolved: 2019-01-10

## 2019-01-10 PROCEDURE — G8982 BODY POS GOAL STATUS: HCPCS | Mod: GP | Performed by: PHYSICAL THERAPIST

## 2019-01-10 PROCEDURE — G8983 BODY POS D/C STATUS: HCPCS | Mod: GP | Performed by: PHYSICAL THERAPIST

## 2019-01-10 PROCEDURE — 97110 THERAPEUTIC EXERCISES: CPT | Mod: GP | Performed by: PHYSICAL THERAPIST

## 2019-01-10 PROCEDURE — G8981 BODY POS CURRENT STATUS: HCPCS | Mod: GP | Performed by: PHYSICAL THERAPIST

## 2019-01-10 PROCEDURE — 97530 THERAPEUTIC ACTIVITIES: CPT | Mod: GP | Performed by: PHYSICAL THERAPIST

## 2019-04-09 DIAGNOSIS — M41.9 SCOLIOSIS: Primary | ICD-10-CM

## 2019-04-09 NOTE — PROGRESS NOTES
[facility]  Orthopedics  Patrick Xavier MD  2019     Name: Jonathon Conde  MRN: [id number]  Age: 28 year old  : 1990  Referring provider: Referred Self     Chief Complaint:   Surgical Followup (DOS: 10/15/18, PSF Thoracic for scoliosis)     Date of Surgery: 10/15/18     Procedure: Spinal Fusion Thoracic 1/2- Thoracic 12, Camargo Lua Osteotomies Thoracic 2/3- Thoracic 10 - Wound Class: I-Clean.     History of Present Illness:   Jonathon Conde is a 28 year old male 6 months status-post the above procedure who presents for postoperative evaluation. He was last evaluated by Rosanne Cain on 2019, at which time he was doing well overall but noted increased pain after a trip to [facility], where he was very active. He continued to take once Vicodin per day as needed and felt like he was progressing in physical therapy. Plan was to resume normal activities as tolerated.     Patient presents today stating that he feels great he has made great improvements in recovering from his surgery.  His only complaint is some right sided pain in the intrascapular region that comes on through his workday.  He treats this with a tennis ball against the wall and with a massage type back cane.  He continues to do his physical therapy exercises but is not going to physical therapy at this time.  He recently finished his doses of gabapentin and discontinued the medication.  He is off of all narcotics and takes an occasional hydroxyzine.  He has resumed all normal activities.    ZOHAIB: 18%  Ukstem41: 30  Pain scale: 0    Physical Examination:  There were no vitals taken for this visit.  Is  PHYSICAL EXAM:   Constitutional - Patient is healthy, well-nourished and appears stated age show show call from.    BMI = 21.87 he has a pacemaker   Respiratory - Patient is breathing normally and in no respiratory distress.   Skin - No suspicious rashes or lesions no.   Psychiatric - Normal mood and affect they wanted to 6 weeks 5  and 6 weeks.   Cardiovascular - Peripheral pulses are normal.   Eyes - Visual acuity is normal to the written word.   ENT - Hearing intact to the spoken word.   GI - No abdominal distention.   Musculoskeletal - Non-antalgic gait without use of assistive devices.  He is in no acute distress.  He is alert and oriented x3.  He stands with a normal erect posture.  Transitional moves are performed easily without accessory use of the arms.  He ambulates with a normal heel toe gait and is able to stand on his heels and toes independently.  He can forward flex to touch his toes.  He can extend his low back about 20 degrees and lean 45 degrees bilaterally and rotate 50 degrees bilaterally.  His thoracic incision is well-healed.  He is nontender to palpation around his instrumented fusion area with the exception of some tenderness in the intrascapular region on the right.                Thoracic Spine:    Appearance - Normal     Palpation - tender to palpation at right-sided intrascapular region                Lumbar Spine:    Appearance - Normal     Palpation - Non-tender to palpation        Motor -        LOWER EXTREMITY Left Right   Hip flexion 5/5 5/5   Knee flexion 5/5 5/5   Knee extension 5/5 5/5   Ankle dorsiflexion 5/5 5/5   Ankle plantarflexion 5/5 5/5   Great toe extension 5/5 5/5   Great toe flexion 5/5 5/5        Special tests -     Straight leg raise - Negative     Pain with hip ROM - Negative          Neurologic - Sensation intact to light touch bilaterally.      REFLEXES Left Right                  Patella 2+ 2+   Ankle jerk 2+ 2 oh family 1 +   5       IMAGING: . Instrumentation in place excellent correction compared to before surgery.    CLINICAL ASSESSMENT:   1.  Patient is making a good recovery at about 5 months out from his T1-T12 fusion.  He has returned to all normal activities and minimized his medications.    DISCUSSION/PLAN:   1.  We discussed the ache at his right-sided intrascapular region.  We  examined his preoperative and postoperative films.  The patient is very pleased with the outcome.  We did discuss physical therapy for scapulothoracic motion and patient was given a list of appropriate therapist.  He will call when he decides to initiate this treatment.  He should return to clinic at his one-year anniversary from surgery which would be around mid October.  Scalp slow growing over top of it does not go to branch to the right great toe so we know 100% certainty where we wanted to that should always with CT so and then K that we can initiate on this 1 so this 1 was done at [facility] tells    All questions and concerns were answered to the patient's apparent satisfaction before leaving the clinic.     Thank you for allowing Dr. Xavier and IRA to participate in the care of Jonathon Conde.    Respectfully,  Mino Robertson PA-C        I saw and evaluated the patient and developed the plan.  Patrick Xavier MD      CC  Copy to patient    [address]  [place] [address]        Scribe Disclosure:   I, Nicole Leal, served as a scribe preparing the chart for the clinic encounter through chart review for the provider team.

## 2019-04-10 ENCOUNTER — ANCILLARY PROCEDURE (OUTPATIENT)
Dept: GENERAL RADIOLOGY | Facility: CLINIC | Age: 29
End: 2019-04-10
Attending: ORTHOPAEDIC SURGERY
Payer: COMMERCIAL

## 2019-04-10 ENCOUNTER — OFFICE VISIT (OUTPATIENT)
Dept: ORTHOPEDICS | Facility: CLINIC | Age: 29
End: 2019-04-10
Payer: COMMERCIAL

## 2019-04-10 VITALS — WEIGHT: 175 LBS | HEIGHT: 75 IN | BODY MASS INDEX: 21.76 KG/M2

## 2019-04-10 DIAGNOSIS — M41.54 OTHER SECONDARY SCOLIOSIS, THORACIC REGION: ICD-10-CM

## 2019-04-10 DIAGNOSIS — Z98.1 S/P SPINAL FUSION: Primary | ICD-10-CM

## 2019-04-10 ASSESSMENT — MIFFLIN-ST. JEOR: SCORE: 1844.42

## 2019-04-10 NOTE — LETTER
4/10/2019       RE: Jonathon Conde  [address]  [place] [address]     Dear Colleague,    Thank you for referring your patient, Jonathon Conde, to the [facility] at [facility]. Please see a copy of my visit note below.    [facility]  Orthopedics  Patrick Xavier MD  2019     Name: Jonathon Conde  MRN: [id number]  Age: 28 year old  : 1990  Referring provider: Referred Self     Chief Complaint:   Surgical Followup (DOS: 10/15/18, PSF Thoracic for scoliosis)     Date of Surgery: 10/15/18     Procedure: Spinal Fusion Thoracic 1/2- Thoracic 12, Camargo Lua Osteotomies Thoracic 2/3- Thoracic 10 - Wound Class: I-Clean.     History of Present Illness:   Jonathon Conde is a 28 year old male 6 months status-post the above procedure who presents for postoperative evaluation. He was last evaluated by Rosanne Cain on 2019, at which time he was doing well overall but noted increased pain after a trip to [facility], where he was very active. He continued to take once Vicodin per day as needed and felt like he was progressing in physical therapy. Plan was to resume normal activities as tolerated.     Patient presents today stating that he feels great he has made great improvements in recovering from his surgery.  His only complaint is some right sided pain in the intrascapular region that comes on through his workday.  He treats this with a tennis ball against the wall and with a massage type back cane.  He continues to do his physical therapy exercises but is not going to physical therapy at this time.  He recently finished his doses of gabapentin and discontinued the medication.  He is off of all narcotics and takes an occasional hydroxyzine.  He has resumed all normal activities.    ZOHAIB: 18%  Pgefqz68: 30  Pain scale: 0    Physical Examination:  There were no vitals taken for this visit.  Is  PHYSICAL EXAM:   Constitutional -  Patient is healthy, well-nourished and appears stated age show show call from.    BMI = 21.87 he has a pacemaker   Respiratory - Patient is breathing normally and in no respiratory distress.   Skin - No suspicious rashes or lesions no.   Psychiatric - Normal mood and affect they wanted to 6 weeks 5 and 6 weeks.   Cardiovascular - Peripheral pulses are normal.   Eyes - Visual acuity is normal to the written word.   ENT - Hearing intact to the spoken word.   GI - No abdominal distention.   Musculoskeletal - Non-antalgic gait without use of assistive devices.  He is in no acute distress.  He is alert and oriented x3.  He stands with a normal erect posture.  Transitional moves are performed easily without accessory use of the arms.  He ambulates with a normal heel toe gait and is able to stand on his heels and toes independently.  He can forward flex to touch his toes.  He can extend his low back about 20 degrees and lean 45 degrees bilaterally and rotate 50 degrees bilaterally.  His thoracic incision is well-healed.  He is nontender to palpation around his instrumented fusion area with the exception of some tenderness in the intrascapular region on the right.                Thoracic Spine:    Appearance - Normal     Palpation - tender to palpation at right-sided intrascapular region                Lumbar Spine:    Appearance - Normal     Palpation - Non-tender to palpation        Motor -        LOWER EXTREMITY Left Right   Hip flexion 5/5 5/5   Knee flexion 5/5 5/5   Knee extension 5/5 5/5   Ankle dorsiflexion 5/5 5/5   Ankle plantarflexion 5/5 5/5   Great toe extension 5/5 5/5   Great toe flexion 5/5 5/5        Special tests -     Straight leg raise - Negative     Pain with hip ROM - Negative          Neurologic - Sensation intact to light touch bilaterally.      REFLEXES Left Right                  Patella 2+ 2+   Ankle jerk 2+ 2 oh family 1 +   5       IMAGING: . Instrumentation in place excellent correction  compared to before surgery.    CLINICAL ASSESSMENT:   1.  Patient is making a good recovery at about 5 months out from his T1-T12 fusion.  He has returned to all normal activities and minimized his medications.    DISCUSSION/PLAN:   1.  We discussed the ache at his right-sided intrascapular region.  We examined his preoperative and postoperative films.  The patient is very pleased with the outcome.  We did discuss physical therapy for scapulothoracic motion and patient was given a list of appropriate therapist.  He will call when he decides to initiate this treatment.  He should return to clinic at his one-year anniversary from surgery which would be around mid October.  Scalp slow growing over top of it does not go to branch to the right great toe so we know 100% certainty where we wanted to that should always with CT so and then K that we can initiate on this 1 so this 1 was done at [facility] tells    All questions and concerns were answered to the patient's apparent satisfaction before leaving the clinic.     Thank you for allowing Dr. Xavier and IRA to participate in the care of Jonathon Conde.    Respectfully,  Mino Robertson PA-C      I saw and evaluated the patient and developed the plan.    Scribe Disclosure:   I, Nicole Leal, served as a scribe preparing the chart for the clinic encounter through chart review for the provider team.      Again, thank you for allowing me to participate in the care of your patient.      Sincerely,    Patrick Xavier MD    CC  Copy to patient    [address]  [place] [address]

## 2019-04-10 NOTE — NURSING NOTE
"Reason For Visit:   Chief Complaint   Patient presents with     Surgical Followup     PSF thoracic for scoliosis DOS: 10/15/18       Primary MD: Kyara Quiñones  Ref. MD: Self   Date of surgery: 10/15/18  Type of surgery: Dr. Xavier.  Smoker: No  Request smoking cessation information: No    Ht 1.905 m (6' 3\")   Wt 79.4 kg (175 lb)   BMI 21.87 kg/m      Pain Assessment  Patient Currently in Pain: No    Oswestry (ZOHAIB) Questionnaire    OSWESTRY DISABILITY INDEX 1/9/2019   Count 10   Sum 9   Oswestry Score (%) 18   Some recent data might be hidden            Neck Disability Index (NDI) Questionnaire    No flowsheet data found.                Promis 10 Assessment    PROMIS 10 4/10/2019   In general, would you say your health is: Very good   In general, would you say your quality of life is: Good   In general, how would you rate your physical health? Good   In general, how would you rate your mental health, including your mood and your ability to think? Fair   In general, how would you rate your satisfaction with your social activities and relationships? Very good   In general, please rate how well you carry out your usual social activities and roles Very good   To what extent are you able to carry out your everyday physical activities such as walking, climbing stairs, carrying groceries, or moving a chair? Completely   How often have you been bothered by emotional problems such as feeling anxious, depressed or irritable? Rarely   How would you rate your fatigue on average? None   How would you rate your pain on average?   0 = No Pain  to  10 = Worst Imaginable Pain 2   Global Physical Health Score : Raw Score -   Global Mental Health Score : Raw Score -   Total (Physical + Mental Health Score) -   In general, would you say your health is: 4   In general, would you say your quality of life is: 3   In general, how would you rate your physical health? 3   In general, how would you rate your mental health, " including your mood and your ability to think? 2   In general, how would you rate your satisfaction with your social activities and relationships? 4   In general, please rate how well you carry out your usual social activities and roles. (This includes activities at home, at work and in your community, and responsibilities as a parent, child, spouse, employee, friend, etc.) 4   To what extent are you able to carry out your everyday physical activities such as walking, climbing stairs, carrying groceries, or moving a chair? 5   In the past 7 days, how often have you been bothered by emotional problems such as feeling anxious, depressed, or irritable? 2   In the past 7 days, how would you rate your fatigue on average? 1   In the past 7 days, how would you rate your pain on average, where 0 means no pain, and 10 means worst imaginable pain? 2   Global Mental Health Score 13   Global Physical Health Score 17   PROMIS TOTAL - SUBSCORES 30   Some recent data might be hidden                Guillermo Francisco ATC

## 2019-09-28 ENCOUNTER — HEALTH MAINTENANCE LETTER (OUTPATIENT)
Age: 29
End: 2019-09-28

## 2020-01-20 DIAGNOSIS — M41.9 SCOLIOSIS OF THORACIC SPINE, UNSPECIFIED SCOLIOSIS TYPE: Primary | ICD-10-CM

## 2020-01-20 DIAGNOSIS — Z98.1 S/P SPINAL FUSION: ICD-10-CM

## 2020-02-05 ENCOUNTER — ANCILLARY PROCEDURE (OUTPATIENT)
Dept: GENERAL RADIOLOGY | Facility: CLINIC | Age: 30
End: 2020-02-05
Attending: ORTHOPAEDIC SURGERY
Payer: COMMERCIAL

## 2020-02-05 ENCOUNTER — OFFICE VISIT (OUTPATIENT)
Dept: ORTHOPEDICS | Facility: CLINIC | Age: 30
End: 2020-02-05
Payer: COMMERCIAL

## 2020-02-05 VITALS — HEIGHT: 76 IN | WEIGHT: 180.5 LBS | BODY MASS INDEX: 21.98 KG/M2

## 2020-02-05 DIAGNOSIS — Z98.1 S/P SPINAL FUSION: Primary | ICD-10-CM

## 2020-02-05 DIAGNOSIS — M89.8X1 PAIN IN SCAPULA: ICD-10-CM

## 2020-02-05 ASSESSMENT — MIFFLIN-ST. JEOR: SCORE: 1876.24

## 2020-02-05 NOTE — LETTER
2/5/2020       RE: Jonathon Conde  [address] [facility] [address]     Dear Colleague,    Thank you for referring your patient, Jonathon Conde, to the [facility] at [facility]. Please see a copy of my visit note below.    REASON FOR VISIT: RECHECK    REFERRING PHYSICIAN: Referred Self     PRIMARY CARE PHYSICIAN: Kyara Quiñones    Procedure Date: 10/15/2018      PROCEDURES:   1.  Posterior spinal fusion T2 to T10.   2.  Segmental spinal instrumentation T2 to T10.   3.  Camargo-Lua osteotomies T3 to T4, T4 to T5, T5 to T6, T6 to 7, T7 to T8, T8 TO t9.   4.  Image-guided surgery.     HISTORY OF PRESENT ILLNESS: Jonathon Conde is a 29 year old male who presents for follow-up on posterior spinal fusion for thoracic scoliosis in 10/15/2018 with Dr. Xavier.  Patient last seen in clinic on 4/10/2019 at which time he was doing well, but with some residual right-sided intrascapular pain.   Today, patient states he is here for routine follow-up appointment.  Of note, he states he has fallen on the ice a few times this winter, but has not had any pain in his back from levels.  He does say that he feels discomfort in his thoracic spine and shoulders when he sits at a desk for too long.  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  Pain localized lower thoracic scapular region.  Improved with moving and stretching.  No other complaints or concerns today.     Oswestry score: 4   Pain scale: 0     PHYSICAL EXAM:   Constitutional - Patient is healthy, well-nourished and appears stated age    Respiratory - Patient is breathing normally and in no respiratory distress.   Skin - No suspicious rashes or lesions.   Psychiatric - Normal mood and affect.   Cardiovascular - Peripheral pulses are normal.   Eyes - Visual acuity is normal to the written word.   ENT - Hearing intact to the spoken word.   GI - No abdominal  distention.   Musculoskeletal - Non-antalgic gait without use of assistive devices.  Patient stands with neutral coronal and sagittal balance.  Left scapula somewhat more prominent than right.  Well-healed midline thoracic surgical scar.  Nontender to palpation of midline or paraspinal muscles.  Localizes pain to left lower scapular area.  Strength 5 out of 5 bilateral upper and lower extremities.  Sensation intact to light touch bilateral upper and lower extremities.    IMAGIN2020 XR spine complete standing EOS AP/lateral views: Stable fusion hardware present from T2-T10 without evidence of hardware failure.  No evidence of degeneration at the levels above or below fusion hardware. Cervical spine without significant degenerative changes. See full radiologic report in chart.    CLINICAL ASSESSMENT:   1. >1 year S/p PSF T2-T10 for scoliosis, stable  2. Scapulothoracic pain    DISCUSSION/PLAN:   Showed patient x-rays from today which demonstrate stable fusion hardware Without evidence of failure or degenerative at the levels above or below hardware.  In regards to his thoracic and scapular pain, this is likely from muscular issues.  A lot of adults experience back pain when sitting for too long at work, so would be helpful to get ergonomic chairs and desks from his work. He could also benefit from Physical Therapy focusing on scapulothoracic region to help him train muscles s/p thoracic spinal fusion. He can follow up in 1-2 years with repeat XRs or earlier if new issues arise.     All questions and concerns were answered to the patient's apparent satisfaction before leaving the clinic.     Thank you for allowing Dr. Xavier and I to participate in the care of Jonathon Conde.    Respectfully,  Janneth Castanon PA-C    I saw and evaluated the patient and developed the plan.  Patrick Xavier MD      CC  Copy to patient    [address] [facility] [address]

## 2020-02-05 NOTE — NURSING NOTE
"Reason For Visit:   Chief Complaint   Patient presents with     RECHECK     DOS 10/15/18, PSF Thoracic for Scoliosis //Conditions continuing from 6 month post surgery visit. Still find it tough to get a comfortable sitting position and keep proper posture. Shoulders consistently make \"popping\" feeling whenever i turn over my arm // per pt lula rouse        Primary MD: Kyara Quiñones  Ref. MD: Self    ?  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  Work status?  Full time.    Date of injury: No  Type of injury: No.    Date of surgery: 10/15/2018  Type of surgery: Posterior spinal fusion T2 to T10. Segmental spinal instrumentation T2 to T10.  Camargo-Lua osteotomies T3 to T4, T4 to T5, T5 to T6, T6 to 7, T7 to T8, T8 TO t9. Image-guided surgery.       Smoker: No  Request smoking cessation information: No    Ht 1.924 m (6' 3.75\")   Wt 81.9 kg (180 lb 8 oz)   BMI 22.12 kg/m      Pain Assessment  Patient Currently in Pain: Denies    SRS: 65%    Oswestry (ZOHAIB) Questionnaire    OSWESTRY DISABILITY INDEX 2/5/2020   Count 10   Sum 2   Oswestry Score (%) 4   Some recent data might be hidden        Visual Analog Pain Scale  Back Pain Scale 0-10: 0  Right leg pain: 0  Left leg pain: 0  Neck Pain Scale 0-10: 0  Right arm pain: 0  Left arm pain: 0    Promis 10 Assessment    PROMIS 10 2/5/2020   In general, would you say your health is: Very good   In general, would you say your quality of life is: Good   In general, how would you rate your physical health? Good   In general, how would you rate your mental health, including your mood and your ability to think? Good   In general, how would you rate your satisfaction with your social activities and relationships? Good   In general, please rate how well you carry out your usual social activities and roles Very good   To what extent are you able to carry out your everyday physical activities such as walking, climbing stairs, carrying " groceries, or moving a chair? Completely   How often have you been bothered by emotional problems such as feeling anxious, depressed or irritable? Sometimes   How would you rate your fatigue on average? Mild   How would you rate your pain on average?   0 = No Pain  to  10 = Worst Imaginable Pain 1   Global Physical Health Score : Raw Score -   Global Mental Health Score : Raw Score -   Total (Physical + Mental Health Score) -   In general, would you say your health is: 4   In general, would you say your quality of life is: 3   In general, how would you rate your physical health? 3   In general, how would you rate your mental health, including your mood and your ability to think? 3   In general, how would you rate your satisfaction with your social activities and relationships? 3   In general, please rate how well you carry out your usual social activities and roles. (This includes activities at home, at work and in your community, and responsibilities as a parent, child, spouse, employee, friend, etc.) 4   To what extent are you able to carry out your everyday physical activities such as walking, climbing stairs, carrying groceries, or moving a chair? 5   In the past 7 days, how often have you been bothered by emotional problems such as feeling anxious, depressed, or irritable? 3   In the past 7 days, how would you rate your fatigue on average? 2   In the past 7 days, how would you rate your pain on average, where 0 means no pain, and 10 means worst imaginable pain? 1   Global Mental Health Score 12   Global Physical Health Score 16   PROMIS TOTAL - SUBSCORES 28   Some recent data might be hidden                Letty Epps LPN

## 2020-02-05 NOTE — PROGRESS NOTES
REASON FOR VISIT: RECHECK    REFERRING PHYSICIAN: Referred Self     PRIMARY CARE PHYSICIAN: Kyara Quiñones    Procedure Date: 10/15/2018      PROCEDURES:   1.  Posterior spinal fusion T2 to T10.   2.  Segmental spinal instrumentation T2 to T10.   3.  Camargo-Lua osteotomies T3 to T4, T4 to T5, T5 to T6, T6 to 7, T7 to T8, T8 TO t9.   4.  Image-guided surgery.     HISTORY OF PRESENT ILLNESS: Jonathon Conde is a 29 year old male who presents for follow-up on posterior spinal fusion for thoracic scoliosis in 10/15/2018 with Dr. Xavier.  Patient last seen in clinic on 4/10/2019 at which time he was doing well, but with some residual right-sided intrascapular pain.   Today, patient states he is here for routine follow-up appointment.  Of note, he states he has fallen on the ice a few times this winter, but has not had any pain in his back from levels.  He does say that he feels discomfort in his thoracic spine and shoulders when he sits at a desk for too long.  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  Pain localized lower thoracic scapular region.  Improved with moving and stretching.  No other complaints or concerns today.     Oswestry score: 4   Pain scale: 0     PHYSICAL EXAM:   Constitutional - Patient is healthy, well-nourished and appears stated age    Respiratory - Patient is breathing normally and in no respiratory distress.   Skin - No suspicious rashes or lesions.   Psychiatric - Normal mood and affect.   Cardiovascular - Peripheral pulses are normal.   Eyes - Visual acuity is normal to the written word.   ENT - Hearing intact to the spoken word.   GI - No abdominal distention.   Musculoskeletal - Non-antalgic gait without use of assistive devices.  Patient stands with neutral coronal and sagittal balance.  Left scapula somewhat more prominent than right.  Well-healed midline thoracic surgical scar.  Nontender to palpation of midline or paraspinal muscles.   Localizes pain to left lower scapular area.  Strength 5 out of 5 bilateral upper and lower extremities.  Sensation intact to light touch bilateral upper and lower extremities.    IMAGIN2020 XR spine complete standing EOS AP/lateral views: Stable fusion hardware present from T2-T10 without evidence of hardware failure.  No evidence of degeneration at the levels above or below fusion hardware. Cervical spine without significant degenerative changes. See full radiologic report in chart.    CLINICAL ASSESSMENT:   1. >1 year S/p PSF T2-T10 for scoliosis, stable  2. Scapulothoracic pain    DISCUSSION/PLAN:   Showed patient x-rays from today which demonstrate stable fusion hardware Without evidence of failure or degenerative at the levels above or below hardware.  In regards to his thoracic and scapular pain, this is likely from muscular issues.  A lot of adults experience back pain when sitting for too long at work, so would be helpful to get ergonomic chairs and desks from his work. He could also benefit from Physical Therapy focusing on scapulothoracic region to help him train muscles s/p thoracic spinal fusion. He can follow up in 1-2 years with repeat XRs or earlier if new issues arise.     All questions and concerns were answered to the patient's apparent satisfaction before leaving the clinic.     Thank you for allowing Dr. Xavier and I to participate in the care of Jonathon Conde.    Respectfully,  Janneth Castanon PA-C    I saw and evaluated the patient and developed the plan.  Patrick Xavier MD      CC  Copy to patient    [address] [facility] [address]

## 2020-02-18 ENCOUNTER — THERAPY VISIT (OUTPATIENT)
Dept: PHYSICAL THERAPY | Facility: CLINIC | Age: 30
End: 2020-02-18
Payer: COMMERCIAL

## 2020-02-18 DIAGNOSIS — Z98.1 S/P SPINAL FUSION: ICD-10-CM

## 2020-02-18 DIAGNOSIS — M89.8X1 PAIN IN SCAPULA: ICD-10-CM

## 2020-02-18 PROCEDURE — 97110 THERAPEUTIC EXERCISES: CPT | Mod: GP | Performed by: PHYSICAL THERAPIST

## 2020-02-18 PROCEDURE — 97530 THERAPEUTIC ACTIVITIES: CPT | Mod: GP | Performed by: PHYSICAL THERAPIST

## 2020-02-18 PROCEDURE — 97161 PT EVAL LOW COMPLEX 20 MIN: CPT | Mod: GP | Performed by: PHYSICAL THERAPIST

## 2020-02-18 NOTE — PROGRESS NOTES
"KEY PT FINDINGS:  1) Upper trap dominant with scapular testing today  2) Poor core endurance with mat based exercises  3) Medial scapular boarder winging    Physical Therapy Initial Evaluation: Subjective History     Injury/Condition Details:  Presenting Complaint Muscle \"popping\" around the shoulder blade and shoulder  Pain with sitting    Onset Timing/Date DOS: 10/15/18   Mechanism Jonathon returned to see Dr. Xavier for his 1 year follow-up and he reported some of his symptoms above. He was referred to PT for strengthening and posture retraining.   Jonathon reports that most of his symptoms are present when he is sitting for a prolonged period of time.      Symptom Behavior Details    Primary Symptoms Sporadic symptoms; Activity/position dependent, pain (Location: Left sided thoracic spine, around the shoulder blade, Quality: Aching/Throbbing), catching/locking   Worst Pain 3-4/10 (with sitting on the couch)   Symptom Provocators Prolonged sitting  Computer usage - arms in the front of him   Best Pain 0/10    Symptom Relievers Changing his posture  Using the theracan   Time of day dependent? No   Recent symptom change? no change in symptoms     Prior Testing/Intervention for current condition:  Prior Tests  x-ray   Prior Treatment PT  and Surgery(ies): Thoracic Fusion     Lifestyle & General Medical History:  Employment [employer]ing [employer] -    Usual physical activities  (within past year) Gym 1x/week   Previous PT exercises for arm strength   Orthopaedic history See Epic Chart   Notable medical history See Epic Chart   Patient Reported Health good       PHYSICAL THERAPY CERVICAL EXAMINATION    Posture: Forward head, rounded shoulders, flat thoracic spine   Cervical Spine ROM Screen   RIGHT LEFT   Cervical Spine ROM   Flexion Full   Extension Full   Rotation Full Full   Sidebend Full Full   Seated Thoracic Spine ROM: not tested due surgical fusion     Passive Joint Mobility Screen: not tested    Tender to " palpation at the following structures: Tight bands and trigger points present throughout medial scapular musculature    Poor core activation in supine.     Shoulder ROM is full. Mild winging of the scapula.     Upper Extremity Flexibility Screen:   Right Left   Pec Major + +   Pec Minor + +   Upper Trap + +   - = normal  + = mild tightness  ++ = moderate tightness  +++ = significant tightness      ASSESSMENT/PLAN:  Patient is a 29 year old male with thoracic and both sides shoulder complaints.    Patient has the following significant findings with corresponding treatment plan.                Diagnosis 1:  Muscular weakness, shoulder pain  Pain -  hot/cold therapy, manual therapy, STS, splint/taping/bracing/orthotics, self management and education  Decreased ROM/flexibility - manual therapy, therapeutic exercise and home program  Decreased strength - therapeutic exercise, therapeutic activities and home program  Impaired muscle performance - neuro re-education and home program  Decreased function - therapeutic activities and home program  Impaired posture - neuro re-education and home program    Therapy Evaluation Codes:   1) History comprised of:   Personal factors that impact the plan of care:      None.    Comorbidity factors that impact the plan of care are:      None.     Medications impacting care: None.  2) Examination of Body Systems comprised of:   Body structures and functions that impact the plan of care:      Shoulder and Thoracic Spine.   Activity limitations that impact the plan of care are:      Lifting and Reading/Computer work.  3) Clinical presentation characteristics are:   Stable/Uncomplicated.  4) Decision-Making    Low complexity using standardized patient assessment instrument and/or measureable assessment of functional outcome.  Cumulative Therapy Evaluation is: Low complexity.    Previous and current functional limitations:  (See Goal Flow Sheet for this information)    Short term and Long  term goals: (See Goal Flow Sheet for this information)     Communication ability:  Patient appears to be able to clearly communicate and understand verbal and written communication and follow directions correctly.  Treatment Explanation - The following has been discussed with the patient:   RX ordered/plan of care  Anticipated outcomes  Possible risks and side effects  This patient would benefit from PT intervention to resume normal activities.   Rehab potential is good.    Frequency:  1 X week, once daily  Duration:  for 1 weeks  Discharge Plan:  Achieve all LTG.  Independent in home treatment program.  Reach maximal therapeutic benefit.    Please refer to the daily flowsheet for treatment today, total treatment time and time spent performing 1:1 timed codes.

## 2021-01-10 ENCOUNTER — HEALTH MAINTENANCE LETTER (OUTPATIENT)
Age: 31
End: 2021-01-10

## 2021-01-12 ENCOUNTER — MYC MEDICAL ADVICE (OUTPATIENT)
Dept: ORTHOPEDICS | Facility: CLINIC | Age: 31
End: 2021-01-12

## 2021-01-12 DIAGNOSIS — Z98.1 S/P SPINAL FUSION: Primary | ICD-10-CM

## 2021-01-12 NOTE — TELEPHONE ENCOUNTER
Pt was scheduled for a virtual visit on 1/27/2021. Imaging orders were placed and pt wants to have it done on a Saturday. Writer gave pt the number to call [phone] to schedule the imaging to be done on Saturday.     Letty Epps LPN

## 2021-01-18 ENCOUNTER — ANCILLARY PROCEDURE (OUTPATIENT)
Dept: GENERAL RADIOLOGY | Facility: CLINIC | Age: 31
End: 2021-01-18
Attending: ORTHOPAEDIC SURGERY
Payer: COMMERCIAL

## 2021-01-18 PROCEDURE — 72082 X-RAY EXAM ENTIRE SPI 2/3 VW: CPT | Performed by: STUDENT IN AN ORGANIZED HEALTH CARE EDUCATION/TRAINING PROGRAM

## 2021-01-18 PROCEDURE — 77073 BONE LENGTH STUDIES: CPT | Performed by: STUDENT IN AN ORGANIZED HEALTH CARE EDUCATION/TRAINING PROGRAM

## 2021-01-27 ENCOUNTER — VIRTUAL VISIT (OUTPATIENT)
Dept: ORTHOPEDICS | Facility: CLINIC | Age: 31
End: 2021-01-27
Payer: COMMERCIAL

## 2021-01-27 DIAGNOSIS — M89.8X1 PAIN IN SCAPULA: ICD-10-CM

## 2021-01-27 DIAGNOSIS — Z98.1 S/P SPINAL FUSION: Primary | ICD-10-CM

## 2021-01-27 DIAGNOSIS — M41.9 SCOLIOSIS OF THORACIC SPINE, UNSPECIFIED SCOLIOSIS TYPE: ICD-10-CM
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
## 2021-01-27 PROCEDURE — 99213 OFFICE O/P EST LOW 20 MIN: CPT | Mod: 95 | Performed by: ORTHOPAEDIC SURGERY
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
## 2021-10-23 ENCOUNTER — HEALTH MAINTENANCE LETTER (OUTPATIENT)
Age: 31
End: 2021-10-23

## 2022-02-12 ENCOUNTER — HEALTH MAINTENANCE LETTER (OUTPATIENT)
Age: 32
End: 2022-02-12

## 2022-10-10 ENCOUNTER — HEALTH MAINTENANCE LETTER (OUTPATIENT)
Age: 32
End: 2022-10-10

## 2023-02-18 ENCOUNTER — HEALTH MAINTENANCE LETTER (OUTPATIENT)
Age: 33
End: 2023-02-18

## 2024-03-16 ENCOUNTER — HEALTH MAINTENANCE LETTER (OUTPATIENT)
Age: 34
End: 2024-03-16

## (undated) DEVICE — ESU ELEC BLADE 2.75" COATED/INSULATED E1455

## (undated) DEVICE — SU VICRYL 2-0 CT-2 27" UND J269H

## (undated) DEVICE — IOM MONITORING 15 MIN

## (undated) DEVICE — DRSG AQUACEL AG 3.5X6.0" HYDROFIBER 412010

## (undated) DEVICE — MIDAS REX DISSECTING TOOL  14MH30

## (undated) DEVICE — SPONGE COTTONOID 3/4X3/4" 80-1401

## (undated) DEVICE — POSITIONER ARMBOARD FOAM 1PAIR LF FP-ARMB1

## (undated) DEVICE — IMM COLLAR CERVICAL MED UNIVERSAL 2.5X24" 79-83520

## (undated) DEVICE — PREP DURAPREP REMOVER 4OZ 8611

## (undated) DEVICE — LINEN STRADDLE PACK

## (undated) DEVICE — DRSG TEGADERM 4X4 3/4" 1626W

## (undated) DEVICE — MIDAS REX DIFFUSER LUBRICANT LEGEND PA100-A

## (undated) DEVICE — SYR 10ML FINGER CONTROL W/O NDL 309695

## (undated) DEVICE — NDL COUNTER 20CT 31142493

## (undated) DEVICE — SPONGE SURGIFOAM 100 1974

## (undated) DEVICE — SU VICRYL 1 CT-1 CR 8X18" J741D

## (undated) DEVICE — LINEN TOWEL PACK X30 5481

## (undated) DEVICE — DRSG DRAIN 4X4" 7086

## (undated) DEVICE — WIPES FOLEY CARE SURESTEP PROVON DFC100

## (undated) DEVICE — STPL SKIN 35W ROTATING HEAD PRW35

## (undated) DEVICE — LINEN BACK PACK 5440

## (undated) DEVICE — SUCTION TIP YANKAUER STR K87

## (undated) DEVICE — PACK SPINE INSTRUMENT DRAPE 76091-ACM7820

## (undated) DEVICE — SPONGE COTTONOID 1X3" 80-1408

## (undated) DEVICE — Device

## (undated) DEVICE — SOL ADH LIQUID BENZOIN SWAB 0.6ML C1544

## (undated) DEVICE — COVER CAMERA IN-LIGHT DISP LT-C02

## (undated) DEVICE — SPONGE RAY-TEC 4X8" 7318

## (undated) DEVICE — DRAPE SLEEVE 599

## (undated) DEVICE — SU MONOCRYL 3-0 PS-2 18" UND Y497G

## (undated) DEVICE — PREP DURAPREP 26ML APL 8630

## (undated) DEVICE — DRSG AQUACEL AG 3.5X13.75" HYDROFIBER 412012

## (undated) DEVICE — DRAIN HEMOVAC RESERVOIR KIT 10FR 1/8" MED 00-2550-002-10

## (undated) DEVICE — SPECIMEN CONTAINER 5OZ STERILE 2600SA

## (undated) DEVICE — IOM SUPPLIES

## (undated) DEVICE — DRAPE POUCH INSTRUMENT 1018

## (undated) DEVICE — GLOVE PROTEXIS W/NEU-THERA 7.0  2D73TE70

## (undated) DEVICE — GLOVE PROTEXIS BLUE W/NEU-THERA 7.0  2D73EB70

## (undated) DEVICE — ESU CORD BIPOLAR GREEN 10-4000

## (undated) DEVICE — SYR 03ML LL W/O NDL 309657

## (undated) DEVICE — RX SURGIFLO HEMOSTATIC MATRIX W/THROMBIN 8ML NEXTGEN 2993

## (undated) DEVICE — GOWN IMPERVIOUS ZONED XLG 9041

## (undated) DEVICE — SU TICRON #5 HOS-14 30" 3027-79

## (undated) DEVICE — SPONGE LAP 18X18" X8435

## (undated) DEVICE — CATH TRAY FOLEY SURESTEP 16FR WDRAIN BAG STLK LATEX A300316A

## (undated) DEVICE — BASIN SET MAJOR

## (undated) DEVICE — BLADE BONE MILL STRK 5.0MM MED 5400-701-000

## (undated) DEVICE — ESU PENCIL W/HOLSTER E2350H

## (undated) DEVICE — ESU GROUND PAD UNIVERSAL W/O CORD

## (undated) DEVICE — BRUSH SURGICAL SCRUB W/4% CHLORHEXIDINE GLUCONATE SOL 4458A

## (undated) DEVICE — SOL WATER IRRIG 1000ML BOTTLE 2F7114

## (undated) DEVICE — DRAPE STERI TOWEL LG 1010

## (undated) DEVICE — SU VICRYL 1 CT-1 36" J347H

## (undated) DEVICE — DRAPE O ARM TUBE 9732722

## (undated) DEVICE — CELL SAVER

## (undated) DEVICE — GLOVE PROTEXIS POWDER FREE 8.5 ORTHOPEDIC 2D73ET85

## (undated) DEVICE — GLOVE PROTEXIS W/NEU-THERA 8.0  2D73TE80

## (undated) DEVICE — MARKER SPHERZ PACK 5

## (undated) DEVICE — DRSG STERI STRIP 1/2X4" R1547

## (undated) RX ORDER — PROPOFOL 10 MG/ML
INJECTION, EMULSION INTRAVENOUS
Status: DISPENSED
Start: 2018-10-15

## (undated) RX ORDER — PHENYLEPHRINE HCL IN 0.9% NACL 1 MG/10 ML
SYRINGE (ML) INTRAVENOUS
Status: DISPENSED
Start: 2018-10-15

## (undated) RX ORDER — FENTANYL CITRATE 50 UG/ML
INJECTION, SOLUTION INTRAMUSCULAR; INTRAVENOUS
Status: DISPENSED
Start: 2018-10-15

## (undated) RX ORDER — DEXAMETHASONE SODIUM PHOSPHATE 4 MG/ML
INJECTION, SOLUTION INTRA-ARTICULAR; INTRALESIONAL; INTRAMUSCULAR; INTRAVENOUS; SOFT TISSUE
Status: DISPENSED
Start: 2018-10-15

## (undated) RX ORDER — ONDANSETRON 2 MG/ML
INJECTION INTRAMUSCULAR; INTRAVENOUS
Status: DISPENSED
Start: 2018-10-15

## (undated) RX ORDER — CEFAZOLIN SODIUM 1 G/3ML
INJECTION, POWDER, FOR SOLUTION INTRAMUSCULAR; INTRAVENOUS
Status: DISPENSED
Start: 2018-10-15

## (undated) RX ORDER — VANCOMYCIN HYDROCHLORIDE 1 G/20ML
INJECTION, POWDER, LYOPHILIZED, FOR SOLUTION INTRAVENOUS
Status: DISPENSED
Start: 2018-10-15

## (undated) RX ORDER — SUFENTANIL CITRATE 50 UG/ML
INJECTION EPIDURAL; INTRAVENOUS
Status: DISPENSED
Start: 2018-10-15

## (undated) RX ORDER — HYDROMORPHONE HYDROCHLORIDE 2 MG/ML
INJECTION, SOLUTION INTRAMUSCULAR; INTRAVENOUS; SUBCUTANEOUS
Status: DISPENSED
Start: 2018-10-15

## (undated) RX ORDER — ACETAMINOPHEN 325 MG/1
TABLET ORAL
Status: DISPENSED
Start: 2018-10-15

## (undated) RX ORDER — LIDOCAINE HYDROCHLORIDE 20 MG/ML
INJECTION, SOLUTION EPIDURAL; INFILTRATION; INTRACAUDAL; PERINEURAL
Status: DISPENSED
Start: 2018-10-15

## (undated) RX ORDER — CEFAZOLIN SODIUM 2 G/100ML
INJECTION, SOLUTION INTRAVENOUS
Status: DISPENSED
Start: 2018-10-15

## (undated) RX ORDER — GABAPENTIN 300 MG/1
CAPSULE ORAL
Status: DISPENSED
Start: 2018-10-15

## (undated) RX ORDER — EPHEDRINE SULFATE 50 MG/ML
INJECTION, SOLUTION INTRAMUSCULAR; INTRAVENOUS; SUBCUTANEOUS
Status: DISPENSED
Start: 2018-10-15